# Patient Record
Sex: FEMALE | Race: WHITE | NOT HISPANIC OR LATINO | Employment: OTHER | URBAN - METROPOLITAN AREA
[De-identification: names, ages, dates, MRNs, and addresses within clinical notes are randomized per-mention and may not be internally consistent; named-entity substitution may affect disease eponyms.]

---

## 2023-04-18 ENCOUNTER — HOSPITAL ENCOUNTER (INPATIENT)
Facility: MEDICAL CENTER | Age: 38
LOS: 8 days | DRG: 432 | End: 2023-04-27
Attending: EMERGENCY MEDICINE | Admitting: INTERNAL MEDICINE
Payer: MEDICARE

## 2023-04-18 DIAGNOSIS — K31.89 PORTAL HYPERTENSIVE GASTROPATHY (HCC): ICD-10-CM

## 2023-04-18 DIAGNOSIS — D61.818 PANCYTOPENIA (HCC): ICD-10-CM

## 2023-04-18 DIAGNOSIS — K75.4 AUTOIMMUNE HEPATITIS (HCC): ICD-10-CM

## 2023-04-18 DIAGNOSIS — K76.6 PORTAL HYPERTENSIVE GASTROPATHY (HCC): ICD-10-CM

## 2023-04-18 DIAGNOSIS — K75.9 HEPATITIS: Chronic | ICD-10-CM

## 2023-04-18 DIAGNOSIS — I85.11 SECONDARY ESOPHAGEAL VARICES WITH BLEEDING (HCC): ICD-10-CM

## 2023-04-18 DIAGNOSIS — D62 ACUTE BLOOD LOSS ANEMIA: ICD-10-CM

## 2023-04-18 DIAGNOSIS — F10.231 ALCOHOL DEPENDENCE WITH WITHDRAWAL DELIRIUM (HCC): ICD-10-CM

## 2023-04-18 DIAGNOSIS — F11.90 CHRONIC, CONTINUOUS USE OF OPIOIDS: ICD-10-CM

## 2023-04-18 PROCEDURE — 80053 COMPREHEN METABOLIC PANEL: CPT | Mod: 91

## 2023-04-18 PROCEDURE — 85730 THROMBOPLASTIN TIME PARTIAL: CPT

## 2023-04-18 PROCEDURE — 82077 ASSAY SPEC XCP UR&BREATH IA: CPT

## 2023-04-18 PROCEDURE — 86900 BLOOD TYPING SEROLOGIC ABO: CPT

## 2023-04-18 PROCEDURE — 86850 RBC ANTIBODY SCREEN: CPT

## 2023-04-18 PROCEDURE — 83690 ASSAY OF LIPASE: CPT | Mod: 91

## 2023-04-18 PROCEDURE — 85025 COMPLETE CBC W/AUTO DIFF WBC: CPT | Mod: 91

## 2023-04-18 PROCEDURE — 36415 COLL VENOUS BLD VENIPUNCTURE: CPT

## 2023-04-18 PROCEDURE — 86901 BLOOD TYPING SEROLOGIC RH(D): CPT

## 2023-04-18 PROCEDURE — 85610 PROTHROMBIN TIME: CPT | Mod: 91

## 2023-04-18 PROCEDURE — 83605 ASSAY OF LACTIC ACID: CPT | Mod: 91

## 2023-04-18 PROCEDURE — 99285 EMERGENCY DEPT VISIT HI MDM: CPT

## 2023-04-18 PROCEDURE — 83735 ASSAY OF MAGNESIUM: CPT

## 2023-04-18 ASSESSMENT — FIBROSIS 4 INDEX: FIB4 SCORE: 6.85

## 2023-04-19 ENCOUNTER — ANESTHESIA EVENT (OUTPATIENT)
Dept: SURGERY | Facility: MEDICAL CENTER | Age: 38
DRG: 432 | End: 2023-04-19
Payer: MEDICARE

## 2023-04-19 ENCOUNTER — ANESTHESIA (OUTPATIENT)
Dept: SURGERY | Facility: MEDICAL CENTER | Age: 38
DRG: 432 | End: 2023-04-19
Payer: MEDICARE

## 2023-04-19 PROBLEM — E87.20 LACTIC ACIDOSIS: Status: ACTIVE | Noted: 2023-04-19

## 2023-04-19 PROBLEM — F10.10 ALCOHOL ABUSE: Status: ACTIVE | Noted: 2023-04-19

## 2023-04-19 PROBLEM — K75.9 HEPATITIS: Status: ACTIVE | Noted: 2023-04-19

## 2023-04-19 PROBLEM — K92.2 GI BLEED: Status: ACTIVE | Noted: 2023-04-19

## 2023-04-19 PROBLEM — D61.818 PANCYTOPENIA (HCC): Status: ACTIVE | Noted: 2023-04-19

## 2023-04-19 PROBLEM — D69.6 THROMBOCYTOPENIA (HCC): Status: ACTIVE | Noted: 2023-04-19

## 2023-04-19 LAB
ABO + RH BLD: NORMAL
ABO GROUP BLD: NORMAL
ALBUMIN SERPL BCP-MCNC: 3.1 G/DL (ref 3.2–4.9)
ALBUMIN SERPL BCP-MCNC: 3.8 G/DL (ref 3.2–4.9)
ALBUMIN/GLOB SERPL: 1.3 G/DL
ALBUMIN/GLOB SERPL: 1.5 G/DL
ALP SERPL-CCNC: 107 U/L (ref 30–99)
ALP SERPL-CCNC: 80 U/L (ref 30–99)
ALT SERPL-CCNC: 22 U/L (ref 2–50)
ALT SERPL-CCNC: 28 U/L (ref 2–50)
AMMONIA PLAS-SCNC: 34 UMOL/L (ref 11–45)
ANION GAP SERPL CALC-SCNC: 10 MMOL/L (ref 7–16)
ANION GAP SERPL CALC-SCNC: 18 MMOL/L (ref 7–16)
APTT PPP: 36.4 SEC (ref 24.7–36)
AST SERPL-CCNC: 49 U/L (ref 12–45)
AST SERPL-CCNC: 55 U/L (ref 12–45)
BARCODED ABORH UBTYP: 6200
BARCODED PRD CODE UBPRD: NORMAL
BARCODED UNIT NUM UBUNT: NORMAL
BASOPHILS # BLD AUTO: 0 % (ref 0–1.8)
BASOPHILS # BLD: 0 K/UL (ref 0–0.12)
BILIRUB SERPL-MCNC: 1.6 MG/DL (ref 0.1–1.5)
BILIRUB SERPL-MCNC: 1.8 MG/DL (ref 0.1–1.5)
BLD GP AB SCN SERPL QL: NORMAL
BLOOD CULTURE HOLD CXBCH: NORMAL
BUN SERPL-MCNC: 10 MG/DL (ref 8–22)
BUN SERPL-MCNC: 12 MG/DL (ref 8–22)
CALCIUM ALBUM COR SERPL-MCNC: 8.4 MG/DL (ref 8.5–10.5)
CALCIUM ALBUM COR SERPL-MCNC: 8.5 MG/DL (ref 8.5–10.5)
CALCIUM SERPL-MCNC: 7.7 MG/DL (ref 8.5–10.5)
CALCIUM SERPL-MCNC: 8.3 MG/DL (ref 8.5–10.5)
CFT BLD TEG: 5.3 MIN (ref 4.6–9.1)
CFT P HPASE BLD TEG: 5.2 MIN (ref 4.3–8.3)
CHLORIDE SERPL-SCNC: 105 MMOL/L (ref 96–112)
CHLORIDE SERPL-SCNC: 107 MMOL/L (ref 96–112)
CLOT ANGLE BLD TEG: 60.1 DEGREES (ref 63–78)
CO2 SERPL-SCNC: 17 MMOL/L (ref 20–33)
CO2 SERPL-SCNC: 20 MMOL/L (ref 20–33)
COMPONENT P 8504P: NORMAL
CREAT SERPL-MCNC: 0.34 MG/DL (ref 0.5–1.4)
CREAT SERPL-MCNC: 0.48 MG/DL (ref 0.5–1.4)
CT.EXTRINSIC BLD ROTEM: >5 MIN (ref 0.8–2.1)
EOSINOPHIL # BLD AUTO: 0 K/UL (ref 0–0.51)
EOSINOPHIL NFR BLD: 0 % (ref 0–6.9)
ERYTHROCYTE [DISTWIDTH] IN BLOOD BY AUTOMATED COUNT: 61.7 FL (ref 35.9–50)
ERYTHROCYTE [DISTWIDTH] IN BLOOD BY AUTOMATED COUNT: 61.7 FL (ref 35.9–50)
ETHANOL BLD-MCNC: <10.1 MG/DL
GFR SERPLBLD CREATININE-BSD FMLA CKD-EPI: 124 ML/MIN/1.73 M 2
GFR SERPLBLD CREATININE-BSD FMLA CKD-EPI: 134 ML/MIN/1.73 M 2
GLOBULIN SER CALC-MCNC: 2.1 G/DL (ref 1.9–3.5)
GLOBULIN SER CALC-MCNC: 2.9 G/DL (ref 1.9–3.5)
GLUCOSE SERPL-MCNC: 69 MG/DL (ref 65–99)
GLUCOSE SERPL-MCNC: 75 MG/DL (ref 65–99)
HCT VFR BLD AUTO: 28.5 % (ref 37–47)
HCT VFR BLD AUTO: 34.1 % (ref 37–47)
HEMOCCULT STL QL: POSITIVE
HGB BLD-MCNC: 11.1 G/DL (ref 12–16)
HGB BLD-MCNC: 9.2 G/DL (ref 12–16)
HGB BLD-MCNC: 9.4 G/DL (ref 12–16)
HGB BLD-MCNC: 9.6 G/DL (ref 12–16)
IMM GRANULOCYTES # BLD AUTO: 0 K/UL (ref 0–0.11)
IMM GRANULOCYTES NFR BLD AUTO: 0 % (ref 0–0.9)
INR PPP: 1.6 (ref 0.87–1.13)
LACTATE SERPL-SCNC: 2.7 MMOL/L (ref 0.5–2)
LACTATE SERPL-SCNC: 3.8 MMOL/L (ref 0.5–2)
LIPASE SERPL-CCNC: 15 U/L (ref 11–82)
LYMPHOCYTES # BLD AUTO: 0.47 K/UL (ref 1–4.8)
LYMPHOCYTES NFR BLD: 13.1 % (ref 22–41)
MAGNESIUM SERPL-MCNC: 1.7 MG/DL (ref 1.5–2.5)
MCF BLD TEG: <40 MM (ref 52–69)
MCF.PLATELET INHIB BLD ROTEM: 11.7 MM (ref 15–32)
MCH RBC QN AUTO: 28 PG (ref 27–33)
MCH RBC QN AUTO: 28.3 PG (ref 27–33)
MCHC RBC AUTO-ENTMCNC: 32.3 G/DL (ref 33.6–35)
MCHC RBC AUTO-ENTMCNC: 32.6 G/DL (ref 33.6–35)
MCV RBC AUTO: 86.9 FL (ref 81.4–97.8)
MCV RBC AUTO: 87 FL (ref 81.4–97.8)
MONOCYTES # BLD AUTO: 0.24 K/UL (ref 0–0.85)
MONOCYTES NFR BLD AUTO: 6.7 % (ref 0–13.4)
NEUTROPHILS # BLD AUTO: 2.89 K/UL (ref 2–7.15)
NEUTROPHILS NFR BLD: 80.2 % (ref 44–72)
NRBC # BLD AUTO: 0 K/UL
NRBC BLD-RTO: 0 /100 WBC
PA AA BLD-ACNC: ABNORMAL % (ref 0–11)
PA ADP BLD-ACNC: ABNORMAL % (ref 0–17)
PLATELET # BLD AUTO: 29 K/UL (ref 164–446)
PLATELET # BLD AUTO: 44 K/UL (ref 164–446)
PMV BLD AUTO: 10.1 FL (ref 9–12.9)
PMV BLD AUTO: 11.1 FL (ref 9–12.9)
POTASSIUM SERPL-SCNC: 3.9 MMOL/L (ref 3.6–5.5)
POTASSIUM SERPL-SCNC: 4 MMOL/L (ref 3.6–5.5)
PRODUCT TYPE UPROD: NORMAL
PROT SERPL-MCNC: 5.2 G/DL (ref 6–8.2)
PROT SERPL-MCNC: 6.7 G/DL (ref 6–8.2)
PROTHROMBIN TIME: 18.7 SEC (ref 12–14.6)
RBC # BLD AUTO: 3.28 M/UL (ref 4.2–5.4)
RBC # BLD AUTO: 3.92 M/UL (ref 4.2–5.4)
RH BLD: NORMAL
SODIUM SERPL-SCNC: 137 MMOL/L (ref 135–145)
SODIUM SERPL-SCNC: 140 MMOL/L (ref 135–145)
TEG ALGORITHM TGALG: ABNORMAL
UNIT STATUS USTAT: NORMAL
WBC # BLD AUTO: 2.2 K/UL (ref 4.8–10.8)
WBC # BLD AUTO: 3.6 K/UL (ref 4.8–10.8)

## 2023-04-19 PROCEDURE — A9270 NON-COVERED ITEM OR SERVICE: HCPCS | Performed by: INTERNAL MEDICINE

## 2023-04-19 PROCEDURE — 770020 HCHG ROOM/CARE - TELE (206)

## 2023-04-19 PROCEDURE — 30233R1 TRANSFUSION OF NONAUTOLOGOUS PLATELETS INTO PERIPHERAL VEIN, PERCUTANEOUS APPROACH: ICD-10-PCS | Performed by: HOSPITALIST

## 2023-04-19 PROCEDURE — 99222 1ST HOSP IP/OBS MODERATE 55: CPT | Mod: AI | Performed by: INTERNAL MEDICINE

## 2023-04-19 PROCEDURE — 160048 HCHG OR STATISTICAL LEVEL 1-5: Performed by: SPECIALIST

## 2023-04-19 PROCEDURE — 700105 HCHG RX REV CODE 258: Performed by: EMERGENCY MEDICINE

## 2023-04-19 PROCEDURE — 700105 HCHG RX REV CODE 258: Performed by: ANESTHESIOLOGY

## 2023-04-19 PROCEDURE — 00731 ANES UPR GI NDSC PX NOS: CPT | Performed by: ANESTHESIOLOGY

## 2023-04-19 PROCEDURE — 700111 HCHG RX REV CODE 636 W/ 250 OVERRIDE (IP): Performed by: ANESTHESIOLOGY

## 2023-04-19 PROCEDURE — 700111 HCHG RX REV CODE 636 W/ 250 OVERRIDE (IP): Performed by: EMERGENCY MEDICINE

## 2023-04-19 PROCEDURE — 160208 HCHG ENDO MINUTES - EA ADDL 1 MIN LEVEL 4: Performed by: SPECIALIST

## 2023-04-19 PROCEDURE — 700111 HCHG RX REV CODE 636 W/ 250 OVERRIDE (IP)

## 2023-04-19 PROCEDURE — 82272 OCCULT BLD FECES 1-3 TESTS: CPT

## 2023-04-19 PROCEDURE — 160035 HCHG PACU - 1ST 60 MINS PHASE I: Performed by: SPECIALIST

## 2023-04-19 PROCEDURE — 36415 COLL VENOUS BLD VENIPUNCTURE: CPT

## 2023-04-19 PROCEDURE — 700102 HCHG RX REV CODE 250 W/ 637 OVERRIDE(OP): Performed by: INTERNAL MEDICINE

## 2023-04-19 PROCEDURE — C9113 INJ PANTOPRAZOLE SODIUM, VIA: HCPCS | Performed by: INTERNAL MEDICINE

## 2023-04-19 PROCEDURE — 80053 COMPREHEN METABOLIC PANEL: CPT

## 2023-04-19 PROCEDURE — 700105 HCHG RX REV CODE 258: Performed by: INTERNAL MEDICINE

## 2023-04-19 PROCEDURE — 160009 HCHG ANES TIME/MIN: Performed by: SPECIALIST

## 2023-04-19 PROCEDURE — 85018 HEMOGLOBIN: CPT

## 2023-04-19 PROCEDURE — 700101 HCHG RX REV CODE 250: Performed by: INTERNAL MEDICINE

## 2023-04-19 PROCEDURE — 85576 BLOOD PLATELET AGGREGATION: CPT

## 2023-04-19 PROCEDURE — 85384 FIBRINOGEN ACTIVITY: CPT

## 2023-04-19 PROCEDURE — 36430 TRANSFUSION BLD/BLD COMPNT: CPT

## 2023-04-19 PROCEDURE — 700101 HCHG RX REV CODE 250: Performed by: ANESTHESIOLOGY

## 2023-04-19 PROCEDURE — 85347 COAGULATION TIME ACTIVATED: CPT

## 2023-04-19 PROCEDURE — 83605 ASSAY OF LACTIC ACID: CPT

## 2023-04-19 PROCEDURE — C9113 INJ PANTOPRAZOLE SODIUM, VIA: HCPCS | Performed by: EMERGENCY MEDICINE

## 2023-04-19 PROCEDURE — 85027 COMPLETE CBC AUTOMATED: CPT

## 2023-04-19 PROCEDURE — 96375 TX/PRO/DX INJ NEW DRUG ADDON: CPT

## 2023-04-19 PROCEDURE — 700111 HCHG RX REV CODE 636 W/ 250 OVERRIDE (IP): Performed by: INTERNAL MEDICINE

## 2023-04-19 PROCEDURE — P9034 PLATELETS, PHERESIS: HCPCS

## 2023-04-19 PROCEDURE — 06L38CZ OCCLUSION OF ESOPHAGEAL VEIN WITH EXTRALUMINAL DEVICE, VIA NATURAL OR ARTIFICIAL OPENING ENDOSCOPIC: ICD-10-PCS | Performed by: SPECIALIST

## 2023-04-19 PROCEDURE — 43205 ESOPHAGUS ENDOSCOPY/LIGATION: CPT | Performed by: SPECIALIST

## 2023-04-19 PROCEDURE — 160203 HCHG ENDO MINUTES - 1ST 30 MINS LEVEL 4: Performed by: SPECIALIST

## 2023-04-19 PROCEDURE — 160002 HCHG RECOVERY MINUTES (STAT): Performed by: SPECIALIST

## 2023-04-19 PROCEDURE — 96365 THER/PROPH/DIAG IV INF INIT: CPT

## 2023-04-19 PROCEDURE — 96366 THER/PROPH/DIAG IV INF ADDON: CPT

## 2023-04-19 PROCEDURE — 82140 ASSAY OF AMMONIA: CPT

## 2023-04-19 RX ORDER — LIDOCAINE HYDROCHLORIDE 20 MG/ML
INJECTION, SOLUTION EPIDURAL; INFILTRATION; INTRACAUDAL; PERINEURAL PRN
Status: DISCONTINUED | OUTPATIENT
Start: 2023-04-19 | End: 2023-04-19 | Stop reason: SURG

## 2023-04-19 RX ORDER — OXYCODONE HYDROCHLORIDE 5 MG/1
5 TABLET ORAL
Status: DISCONTINUED | OUTPATIENT
Start: 2023-04-19 | End: 2023-04-24

## 2023-04-19 RX ORDER — PANTOPRAZOLE SODIUM 40 MG/10ML
40 INJECTION, POWDER, LYOPHILIZED, FOR SOLUTION INTRAVENOUS 2 TIMES DAILY
Status: DISCONTINUED | OUTPATIENT
Start: 2023-04-19 | End: 2023-04-20

## 2023-04-19 RX ORDER — ONDANSETRON 2 MG/ML
4 INJECTION INTRAMUSCULAR; INTRAVENOUS EVERY 4 HOURS PRN
Status: DISCONTINUED | OUTPATIENT
Start: 2023-04-19 | End: 2023-04-27 | Stop reason: HOSPADM

## 2023-04-19 RX ORDER — MORPHINE SULFATE 4 MG/ML
4 INJECTION INTRAVENOUS
Status: DISCONTINUED | OUTPATIENT
Start: 2023-04-19 | End: 2023-04-24

## 2023-04-19 RX ORDER — EPHEDRINE SULFATE 50 MG/ML
5 INJECTION, SOLUTION INTRAVENOUS
Status: DISCONTINUED | OUTPATIENT
Start: 2023-04-19 | End: 2023-04-19 | Stop reason: HOSPADM

## 2023-04-19 RX ORDER — HYDROMORPHONE HYDROCHLORIDE 1 MG/ML
0.4 INJECTION, SOLUTION INTRAMUSCULAR; INTRAVENOUS; SUBCUTANEOUS
Status: DISCONTINUED | OUTPATIENT
Start: 2023-04-19 | End: 2023-04-19 | Stop reason: HOSPADM

## 2023-04-19 RX ORDER — PROMETHAZINE HYDROCHLORIDE 25 MG/1
12.5-25 TABLET ORAL EVERY 4 HOURS PRN
Status: DISCONTINUED | OUTPATIENT
Start: 2023-04-19 | End: 2023-04-24

## 2023-04-19 RX ORDER — BISACODYL 10 MG
10 SUPPOSITORY, RECTAL RECTAL
Status: DISCONTINUED | OUTPATIENT
Start: 2023-04-19 | End: 2023-04-24

## 2023-04-19 RX ORDER — LORAZEPAM 2 MG/ML
0.5 INJECTION INTRAMUSCULAR EVERY 4 HOURS PRN
Status: DISCONTINUED | OUTPATIENT
Start: 2023-04-19 | End: 2023-04-19

## 2023-04-19 RX ORDER — HALOPERIDOL 5 MG/ML
1 INJECTION INTRAMUSCULAR
Status: DISCONTINUED | OUTPATIENT
Start: 2023-04-19 | End: 2023-04-19 | Stop reason: HOSPADM

## 2023-04-19 RX ORDER — DEXMEDETOMIDINE HYDROCHLORIDE 100 UG/ML
INJECTION, SOLUTION INTRAVENOUS PRN
Status: DISCONTINUED | OUTPATIENT
Start: 2023-04-19 | End: 2023-04-19 | Stop reason: SURG

## 2023-04-19 RX ORDER — OXYCODONE HCL 5 MG/5 ML
5 SOLUTION, ORAL ORAL
Status: DISCONTINUED | OUTPATIENT
Start: 2023-04-19 | End: 2023-04-19 | Stop reason: HOSPADM

## 2023-04-19 RX ORDER — OXYCODONE HCL 5 MG/5 ML
10 SOLUTION, ORAL ORAL
Status: DISCONTINUED | OUTPATIENT
Start: 2023-04-19 | End: 2023-04-19 | Stop reason: HOSPADM

## 2023-04-19 RX ORDER — PROCHLORPERAZINE EDISYLATE 5 MG/ML
5-10 INJECTION INTRAMUSCULAR; INTRAVENOUS EVERY 4 HOURS PRN
Status: DISCONTINUED | OUTPATIENT
Start: 2023-04-19 | End: 2023-04-27 | Stop reason: HOSPADM

## 2023-04-19 RX ORDER — SODIUM CHLORIDE 9 MG/ML
INJECTION, SOLUTION INTRAVENOUS CONTINUOUS
Status: DISCONTINUED | OUTPATIENT
Start: 2023-04-19 | End: 2023-04-21

## 2023-04-19 RX ORDER — IPRATROPIUM BROMIDE AND ALBUTEROL SULFATE 2.5; .5 MG/3ML; MG/3ML
3 SOLUTION RESPIRATORY (INHALATION)
Status: DISCONTINUED | OUTPATIENT
Start: 2023-04-19 | End: 2023-04-19 | Stop reason: HOSPADM

## 2023-04-19 RX ORDER — POLYETHYLENE GLYCOL 3350 17 G/17G
1 POWDER, FOR SOLUTION ORAL
Status: DISCONTINUED | OUTPATIENT
Start: 2023-04-19 | End: 2023-04-24

## 2023-04-19 RX ORDER — SODIUM CHLORIDE, SODIUM LACTATE, POTASSIUM CHLORIDE, CALCIUM CHLORIDE 600; 310; 30; 20 MG/100ML; MG/100ML; MG/100ML; MG/100ML
2000 INJECTION, SOLUTION INTRAVENOUS ONCE
Status: COMPLETED | OUTPATIENT
Start: 2023-04-19 | End: 2023-04-19

## 2023-04-19 RX ORDER — PROMETHAZINE HYDROCHLORIDE 25 MG/1
12.5-25 SUPPOSITORY RECTAL EVERY 4 HOURS PRN
Status: DISCONTINUED | OUTPATIENT
Start: 2023-04-19 | End: 2023-04-27 | Stop reason: HOSPADM

## 2023-04-19 RX ORDER — SODIUM CHLORIDE, SODIUM LACTATE, POTASSIUM CHLORIDE, CALCIUM CHLORIDE 600; 310; 30; 20 MG/100ML; MG/100ML; MG/100ML; MG/100ML
INJECTION, SOLUTION INTRAVENOUS CONTINUOUS
Status: CANCELLED | OUTPATIENT
Start: 2023-04-19

## 2023-04-19 RX ORDER — METOPROLOL TARTRATE 1 MG/ML
1 INJECTION, SOLUTION INTRAVENOUS
Status: DISCONTINUED | OUTPATIENT
Start: 2023-04-19 | End: 2023-04-19 | Stop reason: HOSPADM

## 2023-04-19 RX ORDER — OXYCODONE HYDROCHLORIDE 10 MG/1
10 TABLET ORAL
Status: DISCONTINUED | OUTPATIENT
Start: 2023-04-19 | End: 2023-04-24

## 2023-04-19 RX ORDER — HYDROMORPHONE HYDROCHLORIDE 1 MG/ML
0.2 INJECTION, SOLUTION INTRAMUSCULAR; INTRAVENOUS; SUBCUTANEOUS
Status: DISCONTINUED | OUTPATIENT
Start: 2023-04-19 | End: 2023-04-19 | Stop reason: HOSPADM

## 2023-04-19 RX ORDER — LABETALOL HYDROCHLORIDE 5 MG/ML
5 INJECTION, SOLUTION INTRAVENOUS
Status: DISCONTINUED | OUTPATIENT
Start: 2023-04-19 | End: 2023-04-19 | Stop reason: HOSPADM

## 2023-04-19 RX ORDER — ONDANSETRON 2 MG/ML
4 INJECTION INTRAMUSCULAR; INTRAVENOUS
Status: DISCONTINUED | OUTPATIENT
Start: 2023-04-19 | End: 2023-04-19 | Stop reason: HOSPADM

## 2023-04-19 RX ORDER — HYDROMORPHONE HYDROCHLORIDE 1 MG/ML
0.1 INJECTION, SOLUTION INTRAMUSCULAR; INTRAVENOUS; SUBCUTANEOUS
Status: DISCONTINUED | OUTPATIENT
Start: 2023-04-19 | End: 2023-04-19 | Stop reason: HOSPADM

## 2023-04-19 RX ORDER — ALPRAZOLAM 1 MG/1
1 TABLET ORAL NIGHTLY PRN
Status: DISCONTINUED | OUTPATIENT
Start: 2023-04-19 | End: 2023-04-24

## 2023-04-19 RX ORDER — AMOXICILLIN 250 MG
2 CAPSULE ORAL 2 TIMES DAILY
Status: DISCONTINUED | OUTPATIENT
Start: 2023-04-19 | End: 2023-04-24

## 2023-04-19 RX ORDER — MIDAZOLAM HYDROCHLORIDE 1 MG/ML
1 INJECTION INTRAMUSCULAR; INTRAVENOUS
Status: DISCONTINUED | OUTPATIENT
Start: 2023-04-19 | End: 2023-04-19 | Stop reason: HOSPADM

## 2023-04-19 RX ORDER — SODIUM CHLORIDE, SODIUM LACTATE, POTASSIUM CHLORIDE, CALCIUM CHLORIDE 600; 310; 30; 20 MG/100ML; MG/100ML; MG/100ML; MG/100ML
INJECTION, SOLUTION INTRAVENOUS
Status: DISCONTINUED | OUTPATIENT
Start: 2023-04-19 | End: 2023-04-19 | Stop reason: SURG

## 2023-04-19 RX ORDER — CITALOPRAM 20 MG/1
20 TABLET ORAL DAILY
Status: DISCONTINUED | OUTPATIENT
Start: 2023-04-19 | End: 2023-04-24

## 2023-04-19 RX ORDER — LACTULOSE 10 G/15ML
20 SOLUTION ORAL
COMMUNITY
End: 2023-10-19

## 2023-04-19 RX ORDER — LABETALOL HYDROCHLORIDE 5 MG/ML
10 INJECTION, SOLUTION INTRAVENOUS EVERY 4 HOURS PRN
Status: DISCONTINUED | OUTPATIENT
Start: 2023-04-19 | End: 2023-04-27 | Stop reason: HOSPADM

## 2023-04-19 RX ORDER — DIPHENHYDRAMINE HYDROCHLORIDE 50 MG/ML
12.5 INJECTION INTRAMUSCULAR; INTRAVENOUS
Status: DISCONTINUED | OUTPATIENT
Start: 2023-04-19 | End: 2023-04-19 | Stop reason: HOSPADM

## 2023-04-19 RX ORDER — ACETAMINOPHEN 325 MG/1
650 TABLET ORAL EVERY 6 HOURS PRN
Status: DISCONTINUED | OUTPATIENT
Start: 2023-04-19 | End: 2023-04-24

## 2023-04-19 RX ORDER — ONDANSETRON 4 MG/1
4 TABLET, ORALLY DISINTEGRATING ORAL EVERY 4 HOURS PRN
Status: DISCONTINUED | OUTPATIENT
Start: 2023-04-19 | End: 2023-04-24

## 2023-04-19 RX ORDER — MORPHINE SULFATE 4 MG/ML
INJECTION INTRAVENOUS
Status: COMPLETED
Start: 2023-04-19 | End: 2023-04-19

## 2023-04-19 RX ORDER — MEPERIDINE HYDROCHLORIDE 25 MG/ML
12.5 INJECTION INTRAMUSCULAR; INTRAVENOUS; SUBCUTANEOUS
Status: DISCONTINUED | OUTPATIENT
Start: 2023-04-19 | End: 2023-04-19 | Stop reason: HOSPADM

## 2023-04-19 RX ORDER — HYDRALAZINE HYDROCHLORIDE 20 MG/ML
5 INJECTION INTRAMUSCULAR; INTRAVENOUS
Status: DISCONTINUED | OUTPATIENT
Start: 2023-04-19 | End: 2023-04-19 | Stop reason: HOSPADM

## 2023-04-19 RX ORDER — SPIRONOLACTONE 25 MG/1
50 TABLET ORAL 2 TIMES DAILY
Status: DISCONTINUED | OUTPATIENT
Start: 2023-04-19 | End: 2023-04-24

## 2023-04-19 RX ADMIN — SODIUM CHLORIDE, POTASSIUM CHLORIDE, SODIUM LACTATE AND CALCIUM CHLORIDE: 600; 310; 30; 20 INJECTION, SOLUTION INTRAVENOUS at 12:51

## 2023-04-19 RX ADMIN — OXYCODONE HYDROCHLORIDE 10 MG: 10 TABLET ORAL at 19:51

## 2023-04-19 RX ADMIN — SODIUM CHLORIDE, POTASSIUM CHLORIDE, SODIUM LACTATE AND CALCIUM CHLORIDE 2000 ML: 600; 310; 30; 20 INJECTION, SOLUTION INTRAVENOUS at 01:24

## 2023-04-19 RX ADMIN — FENTANYL CITRATE 50 MCG: 50 INJECTION, SOLUTION INTRAMUSCULAR; INTRAVENOUS at 12:59

## 2023-04-19 RX ADMIN — DEXMEDETOMIDINE 50 MCG: 200 INJECTION, SOLUTION INTRAVENOUS at 13:23

## 2023-04-19 RX ADMIN — OXYCODONE 5 MG: 5 TABLET ORAL at 15:17

## 2023-04-19 RX ADMIN — FENTANYL CITRATE 50 MCG: 0.05 INJECTION, SOLUTION INTRAMUSCULAR; INTRAVENOUS at 00:04

## 2023-04-19 RX ADMIN — PROPOFOL 50 MG: 10 INJECTION, EMULSION INTRAVENOUS at 13:20

## 2023-04-19 RX ADMIN — OCTREOTIDE ACETATE 50 MCG/HR: 200 INJECTION, SOLUTION INTRAVENOUS; SUBCUTANEOUS at 03:35

## 2023-04-19 RX ADMIN — SODIUM CHLORIDE: 9 INJECTION, SOLUTION INTRAVENOUS at 04:12

## 2023-04-19 RX ADMIN — PANTOPRAZOLE SODIUM 40 MG: 40 INJECTION, POWDER, LYOPHILIZED, FOR SOLUTION INTRAVENOUS at 16:49

## 2023-04-19 RX ADMIN — SODIUM CHLORIDE 8 MG/HR: 9 INJECTION, SOLUTION INTRAVENOUS at 01:11

## 2023-04-19 RX ADMIN — PROPOFOL 50 MG: 10 INJECTION, EMULSION INTRAVENOUS at 13:24

## 2023-04-19 RX ADMIN — PROPOFOL 50 MG: 10 INJECTION, EMULSION INTRAVENOUS at 13:19

## 2023-04-19 RX ADMIN — PROPOFOL 50 MG: 10 INJECTION, EMULSION INTRAVENOUS at 13:06

## 2023-04-19 RX ADMIN — ALPRAZOLAM 1 MG: 1 TABLET ORAL at 22:58

## 2023-04-19 RX ADMIN — LIDOCAINE HYDROCHLORIDE 100 MG: 20 INJECTION, SOLUTION EPIDURAL; INFILTRATION; INTRACAUDAL at 13:02

## 2023-04-19 RX ADMIN — PROPOFOL 50 MG: 10 INJECTION, EMULSION INTRAVENOUS at 13:10

## 2023-04-19 RX ADMIN — FENTANYL CITRATE 50 MCG: 50 INJECTION, SOLUTION INTRAMUSCULAR; INTRAVENOUS at 12:53

## 2023-04-19 RX ADMIN — CEFTRIAXONE SODIUM 1000 MG: 10 INJECTION, POWDER, FOR SOLUTION INTRAVENOUS at 22:58

## 2023-04-19 RX ADMIN — MORPHINE SULFATE 4 MG: 4 INJECTION INTRAVENOUS at 07:58

## 2023-04-19 RX ADMIN — MORPHINE SULFATE 4 MG: 4 INJECTION INTRAVENOUS at 04:18

## 2023-04-19 RX ADMIN — PANTOPRAZOLE SODIUM 40 MG: 40 INJECTION, POWDER, LYOPHILIZED, FOR SOLUTION INTRAVENOUS at 05:21

## 2023-04-19 RX ADMIN — ONDANSETRON 4 MG: 2 INJECTION INTRAMUSCULAR; INTRAVENOUS at 04:05

## 2023-04-19 RX ADMIN — THIAMINE HYDROCHLORIDE 100 MG: 100 INJECTION, SOLUTION INTRAMUSCULAR; INTRAVENOUS at 05:18

## 2023-04-19 RX ADMIN — OCTREOTIDE ACETATE 50 MCG/HR: 200 INJECTION, SOLUTION INTRAVENOUS; SUBCUTANEOUS at 01:19

## 2023-04-19 RX ADMIN — PROPOFOL 150 MG: 10 INJECTION, EMULSION INTRAVENOUS at 13:02

## 2023-04-19 RX ADMIN — MORPHINE SULFATE 4 MG: 4 INJECTION INTRAVENOUS at 01:03

## 2023-04-19 ASSESSMENT — PAIN DESCRIPTION - PAIN TYPE
TYPE: ACUTE PAIN

## 2023-04-19 ASSESSMENT — ENCOUNTER SYMPTOMS
POLYDIPSIA: 0
HALLUCINATIONS: 0
FEVER: 0
FLANK PAIN: 0
HEADACHES: 0
DOUBLE VISION: 0
NECK PAIN: 0
TREMORS: 0
BACK PAIN: 0
COUGH: 0
DIARRHEA: 0
FALLS: 0
PALPITATIONS: 0
VOMITING: 1
BLURRED VISION: 0
SPEECH CHANGE: 0
ABDOMINAL PAIN: 1
MYALGIAS: 0
SPUTUM PRODUCTION: 0
BLOOD IN STOOL: 0
NAUSEA: 1
HEARTBURN: 0
SHORTNESS OF BREATH: 0
ORTHOPNEA: 0
FOCAL WEAKNESS: 0
BRUISES/BLEEDS EASILY: 0
HEMOPTYSIS: 0
INSOMNIA: 0
PHOTOPHOBIA: 0
CHILLS: 0
CONSTIPATION: 0
ROS GI COMMENTS: HEMATEMESIS
NERVOUS/ANXIOUS: 0
SORE THROAT: 0
WEIGHT LOSS: 0
DIARRHEA: 1
WEAKNESS: 1

## 2023-04-19 ASSESSMENT — LIFESTYLE VARIABLES
DOES PATIENT WANT TO STOP DRINKING: CANNOT ASSESS
SUBSTANCE_ABUSE: 0
TOTAL SCORE: 0
HOW MANY TIMES IN THE PAST YEAR HAVE YOU HAD 5 OR MORE DRINKS IN A DAY: 1
ON A TYPICAL DAY WHEN YOU DRINK ALCOHOL HOW MANY DRINKS DO YOU HAVE: 1
CONSUMPTION TOTAL: POSITIVE
HAVE PEOPLE ANNOYED YOU BY CRITICIZING YOUR DRINKING: NO
ALCOHOL_USE: YES
TOTAL SCORE: 0
HAVE YOU EVER FELT YOU SHOULD CUT DOWN ON YOUR DRINKING: NO
EVER FELT BAD OR GUILTY ABOUT YOUR DRINKING: NO
AVERAGE NUMBER OF DAYS PER WEEK YOU HAVE A DRINK CONTAINING ALCOHOL: 1
EVER HAD A DRINK FIRST THING IN THE MORNING TO STEADY YOUR NERVES TO GET RID OF A HANGOVER: NO
TOTAL SCORE: 0

## 2023-04-19 ASSESSMENT — FIBROSIS 4 INDEX
FIB4 SCORE: 8.98
FIB4 SCORE: 8.98

## 2023-04-19 ASSESSMENT — PATIENT HEALTH QUESTIONNAIRE - PHQ9
2. FEELING DOWN, DEPRESSED, IRRITABLE, OR HOPELESS: NOT AT ALL
SUM OF ALL RESPONSES TO PHQ9 QUESTIONS 1 AND 2: 0
1. LITTLE INTEREST OR PLEASURE IN DOING THINGS: NOT AT ALL

## 2023-04-19 ASSESSMENT — PAIN SCALES - GENERAL: PAIN_LEVEL: 0

## 2023-04-19 NOTE — PROGRESS NOTES
[facility] Medicine Daily Progress Note    Date of Service  4/19/2023    Chief Complaint  Tonie Tineo is a 37 y.o. female admitted 4/18/2023 with hematemesis    Hospital Course  No notes on file    Interval Problem Update  Patient was seen and examined at bedside.  No acute events overnight. Patient is resting comfortably in bed and in no acute distress.     EGD today    I have discussed this patient's plan of care and discharge plan at IDT rounds today with Case Management, Nursing, Nursing leadership, and other members of the IDT team.    Consultants/Specialty  GI    Code Status  Full Code    Disposition  Patient is not medically cleared for discharge.   Anticipate discharge to to home with close outpatient follow-up.  I have placed the appropriate orders for post-discharge needs.    Review of Systems  Review of Systems   Constitutional:  Negative for chills and fever.   HENT:  Negative for congestion and sore throat.    Eyes:  Negative for blurred vision and double vision.   Respiratory:  Negative for cough and shortness of breath.    Cardiovascular:  Negative for chest pain and palpitations.   Skin:  Negative for rash.      Physical Exam  Temp:  [36.7 °C (98 °F)-37.6 °C (99.7 °F)] 37.6 °C (99.7 °F)  Pulse:  [] 82  Resp:  [12-23] 20  BP: ()/() 108/68  SpO2:  [91 %-96 %] 93 %    Physical Exam  Vitals and nursing note reviewed.   Constitutional:       General: She is not in acute distress.     Appearance: She is not toxic-appearing.      Comments: conversational   HENT:      Head: Normocephalic.      Right Ear: External ear normal.      Left Ear: External ear normal.      Nose: No congestion.      Mouth/Throat:      Mouth: Mucous membranes are moist.      Pharynx: No oropharyngeal exudate.   Eyes:      General: No scleral icterus.     Pupils: Pupils are equal, round, and reactive to light.   Cardiovascular:      Rate and Rhythm: Normal rate and regular rhythm.      Heart sounds: No murmur  heard.  Pulmonary:      Breath sounds: No wheezing.   Abdominal:      Tenderness: There is abdominal tenderness. There is no guarding or rebound.   Skin:     Findings: No bruising.   Neurological:      General: No focal deficit present.      Mental Status: She is alert and oriented to person, place, and time.      Cranial Nerves: No cranial nerve deficit.   Psychiatric:         Mood and Affect: Mood normal.         Behavior: Behavior normal.       Fluids    Intake/Output Summary (Last 24 hours) at 4/19/2023 1208  Last data filed at 4/19/2023 1105  Gross per 24 hour   Intake 812.94 ml   Output 100 ml   Net 712.94 ml       Laboratory  Recent Labs     04/18/23 2223 04/18/23 2352 04/19/23  0805 04/19/23  1056   WBC 3.8* 3.6*  --  2.2*   RBC 4.09* 3.92*  --  3.28*   HEMOGLOBIN 11.4* 11.1* 9.4* 9.2*   HEMATOCRIT 35.5* 34.1*  --  28.5*   MCV 86.8 87.0  --  86.9   MCH 27.9 28.3  --  28.0   MCHC 32.1* 32.6*  --  32.3*   RDW 19.2* 61.7*  --  61.7*   PLATELETCT 51* 44*  --  29*   MPV 9.8 11.1  --  10.1     Recent Labs     04/18/23 2223 04/18/23 2352 04/19/23  1056   SODIUM 136 140 137   POTASSIUM 3.5 3.9 4.0   CHLORIDE 103 105 107   CO2 23 17* 20   GLUCOSE 90 75 69   BUN 8 10 12   CREATININE 0.6 0.34* 0.48*   CALCIUM 8.7 8.3* 7.7*     Recent Labs     04/18/23 2223 04/18/23 2352   APTT  --  36.4*   INR 1.36 1.60*               Imaging  No orders to display        Assessment/Plan  * GI bleed- (present on admission)  Assessment & Plan  Patient presented with acute onset hematemesis, abdominal pain.  Abdomen is benign on exam.  Presumed upper GI bleed, likely variceal bleeding, given history of autoimmune hepatitis with variceal bleed 2 years ago requiring banding.  Plan: Continue treatment with octreotide, Protonix, prophylactic ceftriaxone  NPO  Hb drop from 11.4 to 9.4  Consult to GI placed  Transfuse Hb <7      Alcohol abuse  Assessment & Plan  Thiamine supplementation  Monitor for withdrawal    Lactic  acidosis  Assessment & Plan  ?  Secondary to liver disease, alcohol abuse and GI bleed  Continue IV hydration, thiamine, repeat lactic acid    Pancytopenia (HCC)  Assessment & Plan  ?  Secondary to chronic liver disease  Follow-up with PCP    Thrombocytopenia (HCC)  Assessment & Plan  Platelets count 51, 44  ?  Secondary to chronic liver disease  Repeat CBC, consider platelet transfusion if platelets count continues to drop and/or hematemesis recurs    Hepatitis  Assessment & Plan  Reported history of autoimmune hepatitis  Mild bilirubin 1.9 and AST 55 elevation noted.  Patient may have alcoholic hepatitis superimposed on autoimmune hepatitis?  Advised against drinking alcohol         VTE prophylaxis: SCDs/TEDs    I have performed a physical exam and reviewed and updated ROS and Plan today (4/19/2023). In review of yesterday's note (4/18/2023), there are no changes except as documented above.

## 2023-04-19 NOTE — ED PROVIDER NOTES
"ED Provider Note    CHIEF COMPLAINT  Chief Complaint   Patient presents with    GI Problem     Transfer from [facility] for UGIB with history of varices.        EXTERNAL RECORDS REVIEWED  [place] [facility] [place] 2 episodes of hematemesis about 1 to 200 cc of blood.  She was treated with octreotide omeprazole Rocephin antiemetics and some IV fluids.    HPI/ROS  LIMITATION TO HISTORY   Select: : None  OUTSIDE HISTORIAN(S):  EMS states she also received 0.5 mg of Valium prior to arrival and received 4 of Zofran    Tonie Tineo is a 37 y.o. female who presents to the emerged department for hematemesis.  The patient is an alcoholic she is actually in custody today when she started having hematemesis episodes.  She states that she last had a diagnosis of esophageal varices diagnosed 2 years ago in [place].  She states she her last drink was yesterday morning.  She still feels nauseated but mostly she is just having a burning generalized abdominal pain.  No diarrhea no fevers no chills no chest pain or shortness of breath.  She has not had further episodes of emesis since she was at the other facility    PAST MEDICAL HISTORY   has a past medical history of Autoimmune hepatitis (HCC) and Bleeding esophageal varices due to autoimmune hepatitis (HCC).    SURGICAL HISTORY  patient denies any surgical history    FAMILY HISTORY  No family history on file.    SOCIAL HISTORY  Social History     Tobacco Use    Smoking status: Former     Types: Cigarettes    Smokeless tobacco: Never   Vaping Use    Vaping Use: Never used   Substance and Sexual Activity    Alcohol use: Yes     Comment: daily \"99 bananas\"    Drug use: Yes     Comment: Hx of denies recent use.    Sexual activity: Not on file       CURRENT MEDICATIONS  Home Medications    **Home medications have not yet been reviewed for this encounter**         ALLERGIES  Allergies   Allergen Reactions    " "Levofloxacin Anaphylaxis       PHYSICAL EXAM  VITAL SIGNS: /60   Pulse (!) 114   Temp 36.8 °C (98.3 °F) (Temporal)   Resp (!) 23   Ht 1.727 m (5' 8\")   Wt 68 kg (150 lb)   SpO2 95%   BMI 22.81 kg/m²    Pulse Ox Interpretation:   Pulse Ox is within normal limits  Constitutional: Alert in mild distress  HENT: Normocephalic atraumatic, dry mucous membranes  Eyes: PER, Conjunctiva normal, Non-icteric.   Cardiovascular: Regular rate and rhythm, no murmurs.   Thorax & Lungs: Normal breath sounds, No respiratory distress, No wheezing, No chest tenderness.   Abdomen: Bowel sounds normal, Soft, mild generalized tenderness without rebound or guarding no pulsatile masses. No peritoneal signs.  Skin: Warm, Dry, No erythema, No rash.   Extremities/MSK: Intact equal distal pulses, No edema, No tenderness, No cyanosis, no major deformities noted  Neurologic: Alert and oriented x3, No focal deficits noted.       DIAGNOSTIC STUDIES / PROCEDURES  EKG  I have independently interpreted this EKG  No results found for this or any previous visit.      LABS  Labs Reviewed   CBC WITH DIFFERENTIAL - Abnormal; Notable for the following components:       Result Value    WBC 3.6 (*)     RBC 3.92 (*)     Hemoglobin 11.1 (*)     Hematocrit 34.1 (*)     MCHC 32.6 (*)     RDW 61.7 (*)     Platelet Count 44 (*)     Neutrophils-Polys 80.20 (*)     Lymphocytes 13.10 (*)     Lymphs (Absolute) 0.47 (*)     All other components within normal limits    Narrative:     Indicate which anticoagulants the patient is on:->UNKNOWN   COMP METABOLIC PANEL - Abnormal; Notable for the following components:    Co2 17 (*)     Anion Gap 18.0 (*)     Creatinine 0.34 (*)     Calcium 8.3 (*)     AST(SGOT) 55 (*)     Alkaline Phosphatase 107 (*)     Total Bilirubin 1.8 (*)     All other components within normal limits    Narrative:     Indicate which anticoagulants the patient is on:->UNKNOWN   APTT - Abnormal; Notable for the following components:    APTT " 36.4 (*)     All other components within normal limits    Narrative:     Indicate which anticoagulants the patient is on:->UNKNOWN   PROTHROMBIN TIME - Abnormal; Notable for the following components:    PT 18.7 (*)     INR 1.60 (*)     All other components within normal limits    Narrative:     Indicate which anticoagulants the patient is on:->UNKNOWN   LACTIC ACID - Abnormal; Notable for the following components:    Lactic Acid 3.8 (*)     All other components within normal limits   LIPASE    Narrative:     Indicate which anticoagulants the patient is on:->UNKNOWN   COD (ADULT)   AMMONIA   DIAGNOSTIC ALCOHOL    Narrative:     Indicate which anticoagulants the patient is on:->UNKNOWN   ABO RH CONFIRM   CORRECTED CALCIUM    Narrative:     Indicate which anticoagulants the patient is on:->UNKNOWN   ESTIMATED GFR    Narrative:     Indicate which anticoagulants the patient is on:->UNKNOWN   HGB   MAGNESIUM    Narrative:     Indicate which anticoagulants the patient is on:->UNKNOWN   LACTIC ACID         COURSE & MEDICAL DECISION MAKING    ED Observation Status? No; Patient does not meet criteria for ED Observation.     INITIAL ASSESSMENT, COURSE AND PLAN  Care Narrative: Patient presents with upper GI bleed with a history of possible varices this is cannot be corroborated through our system but she did have hematemesis at [place].  She was treated appropriately with octreotide Protonix and Rocephin we will continue the drip of octreotide and Protonix here.  She will be given further pain management for abdominal discomfort but she is no longer feeling nauseated.  Fortunately her hemoglobin only dropped 0.4 over the last hour and a half and she has had no further episodes of vomiting.  I doubt that she is actively hemorrhaging.  She did not have any dark or tarry stools prior to today.      Patient will be resuscitated with IV fluids pain management antiemetics if needed.  She will be admitted to the medicine  service.    1:17 AM  Spoke w Dr Polo who has accepted the patient for admission    HYDRATION: Based on the patient's presentation of Dehydration the patient was given IV fluids. IV Hydration was used because oral hydration failed due to vomiting. Upon recheck following hydration, the patient was improved.      ADDITIONAL PROBLEM LIST  Hematemesis  History of alcohol abuse  History of esophageal varices  Generalized abdominal pain    DISPOSITION AND DISCUSSIONS  I have discussed management of the patient with the following physicians and MAADEO's:  Dr polo    Discussion of management with other QHP or appropriate source(s): Pharmacy for meds drips         Decision tools and prescription drugs considered including, but not limited to: Antibiotics were already given to prophylax for SBP .    FINAL DIAGNOSIS  Upper GI bleed  History of alcohol abuse  History of varices       Electronically signed by: Nae Brink M.D., 4/18/2023 11:54 PM

## 2023-04-19 NOTE — CONSULTS
Date of Consultation:  4/19/2023    Patient: : Tonie Tineo  MRN: [id number]    Referring Physician: Gabe Suarez DO     GI:LUIS ALBERTO Kyle     Reason for Consultation: Hematemesis    History of Present Illness:   This is a 37-year-old female with a past medical history including autoimmune hepatitis, esophageal varices s/p banding (approximately [phone]) who presented to [facility] 4/18/2023.  Patient reports that she does not normally drink alcohol but had 1 alcoholic beverage yesterday.  She states 6 hours after having a drink, she developed nausea and vomited a small amount of blood.  30 minutes after her initial episode of vomiting, she had an episode of hematemesis approximately 100-150 mL and continued to have a total of 7 episodes of hematemesis since.  She reports associated periumbilical abdominal pain described as poking in addition to intermittent cramping.  She also developed black, liquid stools.  She states that she could feel blood building up in her stomach prior to vomiting but that is since been alleviated with medications.  She states that she sees Dr. Reggie Leonardo (GI) in [place], N/V. Current regimen includes lactulose, rifaximin, lactulose, spironolactone and lasix. She states she's had ascites and encephalopathy in the past, was supposed to take lactulose but couldn't afford it and only takes it twice a week.  In the emergency department, hemoglobin initially 11.4, repeat 11.1, and then this morning 9.4.  MCV normocytic at 86-87.  Thrombocytopenia with 51 at 223, follow-up platelets at 2352 44.  CHEM panel with mild metabolic acidosis likely secondary to bleeding.  AST 55, ALT 28, alk phos 107, total bilirubin 1.8 ammonia was initially 37 and subsequently decreased to 34.  Mild lactic acidosis at 3.8 which downtrending to 2.7.  Ethyl alcohol 20, diagnostic alcohol less than 10.1.  INR 1.6  Patient denies fever, chills, weight loss, appetite change,  "dysphagia, odynophagia, heartburn.      Tobacco use: Patient reports tobacco use but that she is in a process of quitting and has not had cigarettes in the last few days.  Alcohol use: Patient reports having 1 drink 14 2023 but normally does not drink alcohol  Illicit drug use: Denies    Last EGD: 2-4 years ago with GI doctor in [place], [place].  She states that esophageal varices were seen and she was treated with banding.  She reports having banding done twice.  Last colonoscopy: 2-4 years ago with GI doctor in [place], [place].    NSAID/ASA use: Denies  Anticoagulation use: Denies      Past Medical History:   Diagnosis Date    Autoimmune hepatitis (HCC)     Bleeding esophageal varices due to autoimmune hepatitis (HCC)          No past surgical history on file.    No family history on file.    Social History     Socioeconomic History    Marital status:    Tobacco Use    Smoking status: Former     Types: Cigarettes    Smokeless tobacco: Never   Vaping Use    Vaping Use: Never used   Substance and Sexual Activity    Alcohol use: Yes     Comment: daily \"99 bananas\"    Drug use: Yes     Comment: Hx of denies recent use.       Review of systems:  Review of Systems   Constitutional:  Positive for malaise/fatigue. Negative for chills and fever.   HENT:  Negative for congestion and sore throat.    Respiratory:  Negative for cough and shortness of breath.    Cardiovascular:  Negative for chest pain and leg swelling.   Gastrointestinal:  Positive for abdominal pain, diarrhea, melena, nausea and vomiting (+hematemesis). Negative for blood in stool, constipation and heartburn.   Genitourinary:  Negative for dysuria and flank pain.   Musculoskeletal:  Negative for falls and myalgias.   Neurological:  Positive for weakness. Negative for headaches.   Psychiatric/Behavioral:  Negative for substance abuse. The patient is not nervous/anxious and does not have insomnia.    All other systems reviewed and are negative.      Physical " Exam:  Vitals:    04/19/23 0250 04/19/23 0300 04/19/23 0552 04/19/23 0733   BP: 93/54 100/57  103/56   Pulse: 91 85  91   Resp: 12 14  20   Temp:  37.1 °C (98.8 °F)  37.3 °C (99.1 °F)   TempSrc:  Temporal  Temporal   SpO2: 93% 94%  92%   Weight:  75 kg (165 lb 5.5 oz) 75 kg (165 lb 5.5 oz)    Height:           Physical Exam  Vitals and nursing note reviewed.   Constitutional:       Appearance: Normal appearance. She is well-developed and overweight. She is not toxic-appearing.   HENT:      Head: Normocephalic.      Nose: Nose normal. No congestion.      Mouth/Throat:      Mouth: Mucous membranes are moist.      Pharynx: Oropharynx is clear. No oropharyngeal exudate.   Eyes:      General: No scleral icterus.     Extraocular Movements: Extraocular movements intact.      Conjunctiva/sclera: Conjunctivae normal.   Cardiovascular:      Rate and Rhythm: Normal rate and regular rhythm.      Pulses: Normal pulses.      Heart sounds: Normal heart sounds. No murmur heard.    No gallop.   Pulmonary:      Effort: Pulmonary effort is normal. No respiratory distress.      Breath sounds: Normal breath sounds. No wheezing.   Chest:      Chest wall: No tenderness.   Abdominal:      General: Abdomen is flat. Bowel sounds are normal. There is no distension.      Palpations: Abdomen is soft.      Tenderness: There is abdominal tenderness. There is no guarding.      Comments: Palpable liver 2 fingerbreadths beneath the costal margin.  No hepatojugular reflex.   Musculoskeletal:      Right lower leg: No edema.      Left lower leg: No edema.   Skin:     General: Skin is warm and dry.      Capillary Refill: Capillary refill takes less than 2 seconds.      Coloration: Skin is pale. Skin is not jaundiced.   Neurological:      General: No focal deficit present.      Mental Status: She is alert and oriented to person, place, and time. Mental status is at baseline.   Psychiatric:         Mood and Affect: Mood normal.         Behavior: Behavior  normal. Behavior is cooperative.         Thought Content: Thought content normal.         Judgment: Judgment normal.         Labs:  Recent Labs     04/18/23 2223 04/18/23 2352 04/19/23  0805   WBC 3.8* 3.6*  --    RBC 4.09* 3.92*  --    HEMOGLOBIN 11.4* 11.1* 9.4*   HEMATOCRIT 35.5* 34.1*  --    MCV 86.8 87.0  --    MCH 27.9 28.3  --    MCHC 32.1* 32.6*  --    RDW 19.2* 61.7*  --    PLATELETCT 51* 44*  --    MPV 9.8 11.1  --      Recent Labs     04/18/23 2223 04/18/23 2352   SODIUM 136 140   POTASSIUM 3.5 3.9   CHLORIDE 103 105   CO2 23 17*   GLUCOSE 90 75   BUN 8 10     Recent Labs     04/18/23 2223 04/18/23 2352   APTT  --  36.4*   INR 1.36 1.60*       Recent Labs     04/18/23 2223 04/18/23 2352 04/19/23  0009   ASTSGOT 52* 55*  --    ALTSGPT 32 28  --    TBILIRUBIN 1.9* 1.8*  --    ALKPHOSPHAT 111 107*  --    GLOBULIN  --  2.9  --    INR 1.36 1.60*  --    AMMONIA 37*  --  34         Imaging:  No image results found.            Impressions:  Hematemesis  Acute blood loss anemia secondary to hematemesis  Autoimmune hepatitis  Severe thrombocytopenia  Hyperammonemia-downtrending      MDM:  This is a pleasant 37-year-old female with a past medical history including autoimmune hepatitis and esophageal varices s/p banding who presents with multiple episodes of hematemesis.  Hemoglobin and platelets downtrending.  We will recheck stat labs.  Discussed concerns for upper GI bleeding with patient as well as further evaluation and possible treatment via EGD.  She is agreeable to proceed.  She is been n.p.o. since 4/18/2023.    UPDATE 1144: repeat cbc shows hgb stable but platelets  29. Updated Dr. Carr who is agreeable to continue with scoping due to large volume hematemesis. Ordered TEG as well as platelet infusion. Updated Dr. Suarez.          Recommendations:    Monitor h/h-  Hgb goal >7g (>9 in setting of active chest pain, ACS), INR <2, plat >50 with active bleeding.    - In this particular instance a variceal  bleed is on the differential diagnosis:  - Continue Octreotide drip 50mcg/hr  -Continue antibiotic (ceftriaxone typically) antibiotic prophylaxis is recommended in any cirrhotic with a gastrointestinal bleed of any fashion  - Do not overtransfuse in cirrhosis as it can increase portal pressures further and potentially worsen bleeding.  -Recommend obtaining abd US to assess for ascites    Recommend Spirinolactone 50mg, lasix 20mg as BP tolerates.  Recommend lactulose- titrate to 2-3 BM's per day.   Continue rifaximin.   Continue PPI IV BID  Maintain n.p.o. status  Plan for EGD today  Stat CBC and CMP    Discussed with patient, Dr. Suarez, Dr. Carr.      This note was generated using voice recognition software which has a small chance of producing errors of grammar and possibly content. I have made every reasonable attempt to find and correct any obvious errors, but expect that some may not be found prior to finalization of this note.

## 2023-04-19 NOTE — ASSESSMENT & PLAN NOTE
?  Secondary to liver disease, alcohol abuse and GI bleed  Continue IV hydration, thiamine, repeat lactic acid

## 2023-04-19 NOTE — PROGRESS NOTES
Pt received from ED. Tele monitor in place. VSS. Pt oriented to room. Educated on use of the call light. Pt demonstrated use of the call light. Discussed POC. All questions answered.

## 2023-04-19 NOTE — PROCEDURES
OPERATIVE REPORT    PATIENT:   Tonie Tineo   1985       PREOPERATIVE DIAGNOSES/INDICATIONS: hematemesis    POSTOPERATIVE DIAGNOSIS: esophageal varices, portal hypertensive gastropathy    PROCEDURE:  ESOPHAGOGASTRODUODENOSCOPY with banding    PHYSICIAN:  Nae Carr MD    ANESTHESIA:  Per anesthesiologist.    LOCATION: [facility]    CONSENT:  OBTAINED. The risks, benefits and alternatives of the procedure were discussed in details. The risks include and are not limited to bleeding, infection, perforation, missed lesions, and sedations risks (cardiopulmonary compromise and allergic reaction to medications).    DESCRIPTION: The patient presented to the procedure room.  After routine checkup was performed, patient was brought into the endoscopy suite.  Patient was placed on his left lateral decubitus position. A bite block was placed in patient's mouth. Patient was sedated by anesthesia.  Vital signs were monitored throughout the procedure.  Oxygenation support was provided throughout the procedure. Upper endoscope was inserted into patient's mouth and advanced to the second portion of the duodenum under direct visualization.      Once the site was reached and examined, the upper endoscope was withdrawn.  Retroflexion was made within the stomach.  The stomach was decompressed, scope was withdrawn and the procedure was terminated.    The patient tolerated the procedure well.  There were no immediate complications.    OPERATIVE FINDINGS:    1. Esophagus: distal varices only with red tamica spots. Two columns(very short) and two bands placed. Varices flattened.  2. Stomach: portal hypertensive gastropathy. No bleeding. No gastric varices.   3. Duodenum: normal    IMPRESSION/RECOMMENDATIONS:  Esophageal varices  Portal hypertensive gastropathy    Follow for bleeding, H/H and melena or hematemesis  Transfuse if needed.   Patient needs treatment for autoimmune hepatitis and follow up with hepatology  Continue  octreotide    This note has been transcribed with digital voice recognition software and although it has been reviewed may contain grammatical or word errors

## 2023-04-19 NOTE — ANESTHESIA TIME REPORT
Anesthesia Start and Stop Event Times     Date Time Event    4/19/2023 1233 Ready for Procedure     1251 Anesthesia Start     1339 Anesthesia Stop        Responsible Staff  04/19/23    Name Role Begin End    Vinicio Marrero M.D. Anesth 1251 1339        Overtime Reason:  no overtime (within assigned shift)    Comments:

## 2023-04-19 NOTE — ED NOTES
Pt transported on full vitals monitoring with T812 RN. All drips verified at bedside. Pt care transferred.

## 2023-04-19 NOTE — CARE PLAN
Problem: Pain - Standard  Goal: Alleviation of pain or a reduction in pain to the patient’s comfort goal  Outcome: Progressing     Problem: Knowledge Deficit - Standard  Goal: Patient and family/care givers will demonstrate understanding of plan of care, disease process/condition, diagnostic tests and medications  Outcome: Progressing   The patient is Watcher - Medium risk of patient condition declining or worsening         Progress made toward(s) clinical / shift goals:  Progressing    Patient is not progressing towards the following goals:

## 2023-04-19 NOTE — H&P
Hospital Medicine History & Physical Note    Date of Service  4/19/2023    Primary Care Physician  Pcp Pt States None      Code Status  Full Code    Chief Complaint  Chief Complaint   Patient presents with    GI Problem     Transfer from [facility] for UGIB with history of varices.        History of Presenting Illness  Tonie Tineo is a 37 y.o. female with past medical history of autoimmune hepatitis, variceal bleeding treated with banding 2 years ago in [place], who presented 4/18/2023 with complaints of vomiting with bright blood, that started around 10 PM after patient was incarcerated for unclear reason by law enforcement.  She reports total 6 times vomiting since then, each time with about 100 cc of bright blood.  Ms. Tineo reports sharp upper abdominal pain radiating to the left side, 7 out of 10, without alleviating or aggravating factors.  Last bowel movement was in the morning and was without melena nor blood in stool.  Reported binge drinking yesterday, otherwise patient denies regular alcohol intake.  Denies NSAID or blood thinners use.  Patient presented at [facility] ER and then transferred here.  Subsequently, treated with Protonix and octreotide.  At this time she denies nausea or abdominal pain.  She has not had any vomiting in the ER of [facility].  Vitals showed tachycardia 114, with stable blood pressure.  Hemoglobin 11.4, 11.1.  Platelets count 44.  Lactic acid is elevated at 3.8.  Alcohol level is elevated at 20.  INR 1.6  I discussed the plan of care with patient.    Review of Systems  Review of Systems   Constitutional:  Positive for malaise/fatigue. Negative for chills, fever and weight loss.   HENT:  Negative for ear pain, hearing loss and tinnitus.    Eyes:  Negative for blurred vision, double vision and photophobia.   Respiratory:  Negative for cough, hemoptysis and sputum production.    Cardiovascular:  Negative for chest pain, palpitations and orthopnea.    Gastrointestinal:  Positive for abdominal pain, nausea and vomiting. Negative for blood in stool, constipation, diarrhea, heartburn and melena.        Hematemesis   Genitourinary:  Negative for dysuria, flank pain, frequency and hematuria.   Musculoskeletal:  Negative for back pain, joint pain and neck pain.   Skin:  Negative for itching and rash.   Neurological:  Negative for tremors, speech change, focal weakness and headaches.   Endo/Heme/Allergies:  Negative for environmental allergies and polydipsia. Does not bruise/bleed easily.   Psychiatric/Behavioral:  Negative for hallucinations and substance abuse. The patient is not nervous/anxious.      Past Medical History   has a past medical history of Autoimmune hepatitis (HCC) and Bleeding esophageal varices due to autoimmune hepatitis (HCC).    Surgical History   has no past surgical history on file.     Family History     Family history reviewed with patient. There is no family history that is pertinent to the chief complaint.     Social History   reports that she has quit smoking. Her smoking use included cigarettes. She has never used smokeless tobacco. She reports current alcohol use. She reports current drug use.    Allergies  Allergies   Allergen Reactions    Levofloxacin Anaphylaxis       Medications  Prior to Admission Medications   Prescriptions Last Dose Informant Patient Reported? Taking?   Ferrous Sulfate (IRON) 325 (65 Fe) MG Tab 4/18/2023 at [facility] Patient Yes No   Sig: Take  by mouth.   HYDROcodone-acetaminophen (NORCO) 7.5-325 MG tab 4/18/2023 at [facility] Patient Yes No   Sig: Take 1 Tablet by mouth in the morning, at noon, and at bedtime.   alprazolam (XANAX) 2 MG tablet 4/17/2023 at PM Patient Yes No   Sig: Take 1 mg by mouth at bedtime as needed for Sleep.   benzonatate (TESSALON) 100 MG Cap 2 WEEKS AGO at [facility] Patient No No   Sig: Take 1 Capsule by mouth 3 times a day as needed for Cough.   citalopram (CELEXA) 20 MG Tab 4/17/2023 at PM Patient Yes No    Sig: Take 20 mg by mouth every day.   furosemide (LASIX) 40 MG Tab 4/18/2023 at [facility] Patient Yes No   Sig: Take 40 mg by mouth every day.   lactulose 10 GM/15ML Solution UNK at [facility] Patient Yes No   Sig: Take 20 g by mouth two times a week.   midodrine (PROAMATINE) 10 MG tablet PRN at [facility] Patient Yes No   Sig: Take 10 mg by mouth 3 times a day as needed. Indications: Disorder of Low Blood Pressure   nadolol (CORGARD) 20 MG Tab 4/18/2023 at AM Patient Yes No   Sig: Take 20 mg by mouth every day.   ondansetron (ZOFRAN ODT) 4 MG TABLET DISPERSIBLE PRN at [facility] Patient No No   Sig: Take 1 Tablet by mouth every four hours as needed for Nausea/Vomiting.   oxyCODONE immediate release (ROXICODONE) 10 MG immediate release tablet Not started at [facility] Patient Yes No   Sig: Take 10 mg by mouth in the morning, at noon, and at bedtime.   pantoprazole (PROTONIX) 40 MG Tablet Delayed Response 4/18/2023 at pm Patient Yes No   Sig: Take 40 mg by mouth 2 times a day.   riFAXIMin (XIFAXAN) 550 MG Tab tablet 4/18/2023 at [facility] Patient Yes No   Sig: Take 550 mg by mouth every day.   spironolactone (ALDACTONE) 50 MG Tab 4/18/2023 at [facility] Patient Yes No   Sig: Take 50 mg by mouth 2 times a day.      Facility-Administered Medications: None       Physical Exam  Temp:  [36.7 °C (98 °F)-36.8 °C (98.3 °F)] 36.8 °C (98.3 °F)  Pulse:  [108-114] 114  Resp:  [18-23] 23  BP: (121-129)/() 129/60  SpO2:  [91 %-95 %] 95 %  Blood Pressure: 129/60   Temperature: 36.8 °C (98.3 °F)   Pulse: (!) 114   Respiration: (!) 23   Pulse Oximetry: 95 %       Physical Exam  Vitals and nursing note reviewed.   Constitutional:       General: She is not in acute distress.     Appearance: Normal appearance. She is ill-appearing.   HENT:      Head: Normocephalic and atraumatic.      Nose: Nose normal.      Mouth/Throat:      Mouth: Mucous membranes are moist.   Eyes:      Extraocular Movements: Extraocular movements intact.      Pupils: Pupils are equal, round, and  reactive to light.   Cardiovascular:      Rate and Rhythm: Regular rhythm. Tachycardia present.   Pulmonary:      Effort: Pulmonary effort is normal.      Breath sounds: Normal breath sounds.   Abdominal:      General: Abdomen is flat. There is no distension.      Tenderness: There is abdominal tenderness in the epigastric area. There is no guarding or rebound.   Musculoskeletal:         General: No swelling or deformity. Normal range of motion.      Cervical back: Normal range of motion and neck supple.   Skin:     General: Skin is warm and dry.   Neurological:      General: No focal deficit present.      Mental Status: She is alert and oriented to person, place, and time.   Psychiatric:         Mood and Affect: Mood normal.         Behavior: Behavior normal.       Laboratory:  Recent Labs     04/18/23 2223 04/18/23 2352   WBC 3.8* 3.6*   RBC 4.09* 3.92*   HEMOGLOBIN 11.4* 11.1*   HEMATOCRIT 35.5* 34.1*   MCV 86.8 87.0   MCH 27.9 28.3   MCHC 32.1* 32.6*   RDW 19.2* 61.7*   PLATELETCT 51* 44*   MPV 9.8 11.1     Recent Labs     04/18/23 2223 04/18/23 2352   SODIUM 136 140   POTASSIUM 3.5 3.9   CHLORIDE 103 105   CO2 23 17*   GLUCOSE 90 75   BUN 8 10   CREATININE 0.6 0.34*   CALCIUM 8.7 8.3*     Recent Labs     04/18/23 2223 04/18/23 2352   ALTSGPT 32 28   ASTSGOT 52* 55*   ALKPHOSPHAT 111 107*   TBILIRUBIN 1.9* 1.8*   LIPASE 16 15   GLUCOSE 90 75     Recent Labs     04/18/23 2223 04/18/23 2352   APTT  --  36.4*   INR 1.36 1.60*     No results for input(s): NTPROBNP in the last 72 hours.      No results for input(s): TROPONINT in the last 72 hours.    Imaging:  No orders to display       Assessment/Plan:  Justification for Admission Status  I anticipate this patient will require at least two midnights for appropriate medical management, necessitating inpatient admission because of GI bleeding    Patient will need a Telemetry bed on MEDICAL service .  The need is secondary to GI bleed.    * GI bleed- (present  on admission)  Assessment & Plan  Patient presented with acute onset hematemesis, abdominal pain.  Abdomen is benign on exam.  Presumed upper GI bleed, likely variceal bleeding, given history of autoimmune hepatitis with variceal bleed 2 years ago requiring banding.  Plan: Continue treatment with octreotide, Protonix, prophylactic ceftriaxone  N.p.o. strictly, strict bedrest for now.  Monitor on telemetry vitals.  Continue IV fluid support.  Hold diuretics and beta-blocker.  Monitor H&H.  Hemoglobin 11.1.  RBC transfusion as needed with goal hemoglobin 7.0 and above  Plan to consult GI in the morning.    Alcohol abuse  Assessment & Plan  Thiamine supplementation  Monitor for withdrawal    Lactic acidosis  Assessment & Plan  ?  Secondary to liver disease, alcohol abuse and GI bleed  Continue IV hydration, thiamine, repeat lactic acid    Pancytopenia (HCC)  Assessment & Plan  ?  Secondary to chronic liver disease  Follow-up with PCP    Thrombocytopenia (HCC)  Assessment & Plan  Platelets count 51, 44  ?  Secondary to chronic liver disease  Repeat CBC, consider platelet transfusion if platelets count continues to drop and/or hematemesis recurs    Hepatitis  Assessment & Plan  Reported history of autoimmune hepatitis  Mild bilirubin 1.9 and AST 55 elevation noted.  Patient may have alcoholic hepatitis superimposed on autoimmune hepatitis?  Advised against drinking alcohol        VTE prophylaxis: SCDs/TEDs

## 2023-04-19 NOTE — ASSESSMENT & PLAN NOTE
Reported history of autoimmune hepatitis  The patient also with alcohol abuse  Will need close GI follow-up and alcohol cessation, medical adherence

## 2023-04-19 NOTE — ASSESSMENT & PLAN NOTE
Patient presented with acute onset hematemesis, abdominal pain.  Secondary to GI bleed, variceal bleeding, given history of autoimmune hepatitis with variceal bleed 2 years ago requiring banding.  GI consulted  S/p EGD with banding of esophageal varices

## 2023-04-19 NOTE — ED TRIAGE NOTES
Chief Complaint   Patient presents with    GI Problem     Transfer from [facility] for UGIB with history of varices.      Received zofran 4 mg, rocephin 2 gms, protonix 80 mg, valium 2.5 mg, phenergan 25 mg, ocreotide 50 mcg.

## 2023-04-19 NOTE — CARE PLAN
The patient is Watcher - Medium risk of patient condition declining or worsening    Shift Goals  Clinical Goals: Manage ABD pain, Watch Hgb, EGD  Patient Goals: Pain control    Progress made toward(s) clinical / shift goals:    Problem: Pain - Standard  Goal: Alleviation of pain or a reduction in pain to the patient’s comfort goal  Outcome: Progressing     Problem: Knowledge Deficit - Standard  Goal: Patient and family/care givers will demonstrate understanding of plan of care, disease process/condition, diagnostic tests and medications  Outcome: Progressing     Problem: Communication  Goal: The ability to communicate needs accurately and effectively will improve  Outcome: Progressing     Problem: Hemodynamics  Goal: Patient's hemodynamics, fluid balance and neurologic status will be stable or improve  Outcome: Progressing     Problem: Fluid Volume  Goal: Fluid volume balance will be maintained  Outcome: Progressing       Patient is not progressing towards the following goals:

## 2023-04-19 NOTE — PROGRESS NOTES
Patient back on unit, VSS with tele box monitoring. SR in 80's. On 2L NC, Pain at a 6/10 pt medicated. Moved back to clear liquid diet per MD.

## 2023-04-19 NOTE — OR NURSING
1333 Arrived from OR. ID verified. Report received attached to monitors. Patient sleep easy to arouse. 6l o2 mask  respirations even and unlabored. Vss.     134 Hand off to Cat RN

## 2023-04-19 NOTE — PROGRESS NOTES
Report received from outgoing nurse, care transferred at 0700. Patient A&Ox 4. Patient reports 7/10 lower left quad pain. MD notified, medication given. Whiteboard updated with plan for the day and names of staff. No questions or concerns at this time from the patient. Call light within reach, fall precautions in place.

## 2023-04-19 NOTE — ANESTHESIA POSTPROCEDURE EVALUATION
Patient: Tonie Tineo    Procedure Summary     Date: 04/19/23 Room / Location: [facility] ROOM 26 / SURGERY SAME DAY [facility]    Anesthesia Start: 1251 Anesthesia Stop: 1339    Procedures:       GASTROSCOPY (Esophagus)      GASTROSCOPY, WITH VARICEAL BANDING (Esophagus) Diagnosis: (Esophageal Varices,)    Surgeons: Nae Carr M.D. Responsible Provider: Vinicio Marrero M.D.    Anesthesia Type: general ASA Status: 3          Final Anesthesia Type: general  Last vitals  BP   Blood Pressure: 114/71    Temp   37.4 °C (99.4 °F)    Pulse   94   Resp   16    SpO2   94 %      Anesthesia Post Evaluation    Patient location during evaluation: PACU  Patient participation: complete - patient participated  Level of consciousness: awake and alert  Pain score: 0    Airway patency: patent  Anesthetic complications: no  Cardiovascular status: hemodynamically stable  Respiratory status: acceptable  Hydration status: euvolemic    PONV: none          There were no known notable events for this encounter.     Nurse Pain Score: 6 (NPRS)

## 2023-04-19 NOTE — OR NURSING
1422 patient's recovery is complete, vss, denies pain and nausea, report called to floor RN, plan explained to patient, patient agrees/asks questions, patient voided x1 in PACU, transferred to room with this RN and transport tech, safety ensured, care transferred

## 2023-04-19 NOTE — ANESTHESIA PREPROCEDURE EVALUATION
Case: [id number] Date/Time: 04/19/23 1245    Procedure: GASTROSCOPY (Esophagus)    Anesthesia type: MAC    Location: [facility] ROOM 26 / SURGERY SAME DAY [facility]    Surgeons: Nae Carr M.D.          Relevant Problems      (positive) Hepatitis       Physical Exam    Airway   Mallampati: II  TM distance: >3 FB  Neck ROM: full       Cardiovascular - normal exam  Rhythm: regular  Rate: normal  (-) murmur     Dental - normal exam           Pulmonary - normal exam  Breath sounds clear to auscultation     Abdominal    Neurological - normal exam                 Anesthesia Plan    ASA 3   ASA physical status 3 criteria: active hepatitis    Plan - general       Airway plan will be mask          Induction: intravenous    Postoperative Plan: Postoperative administration of opioids is intended.    Pertinent diagnostic labs and testing reviewed    Informed Consent:    Anesthetic plan and risks discussed with patient.    Use of blood products discussed with: patient whom consented to blood products.

## 2023-04-20 ENCOUNTER — APPOINTMENT (OUTPATIENT)
Dept: RADIOLOGY | Facility: MEDICAL CENTER | Age: 38
DRG: 432 | End: 2023-04-20
Attending: INTERNAL MEDICINE
Payer: MEDICARE

## 2023-04-20 PROBLEM — D62 ACUTE BLOOD LOSS ANEMIA: Status: ACTIVE | Noted: 2023-04-19

## 2023-04-20 PROBLEM — J18.9 COMMUNITY ACQUIRED PNEUMONIA: Status: ACTIVE | Noted: 2023-04-20

## 2023-04-20 PROBLEM — J96.01 ACUTE RESPIRATORY FAILURE WITH HYPOXIA (HCC): Status: ACTIVE | Noted: 2023-04-20

## 2023-04-20 LAB
ALBUMIN SERPL BCP-MCNC: 3 G/DL (ref 3.2–4.9)
ALBUMIN/GLOB SERPL: 1.3 G/DL
ALP SERPL-CCNC: 78 U/L (ref 30–99)
ALT SERPL-CCNC: 22 U/L (ref 2–50)
ANION GAP SERPL CALC-SCNC: 10 MMOL/L (ref 7–16)
AST SERPL-CCNC: 48 U/L (ref 12–45)
BASOPHILS # BLD AUTO: 0.5 % (ref 0–1.8)
BASOPHILS # BLD: 0.01 K/UL (ref 0–0.12)
BILIRUB SERPL-MCNC: 1.4 MG/DL (ref 0.1–1.5)
BUN SERPL-MCNC: 11 MG/DL (ref 8–22)
CALCIUM ALBUM COR SERPL-MCNC: 8.3 MG/DL (ref 8.5–10.5)
CALCIUM SERPL-MCNC: 7.5 MG/DL (ref 8.5–10.5)
CHLORIDE SERPL-SCNC: 110 MMOL/L (ref 96–112)
CO2 SERPL-SCNC: 20 MMOL/L (ref 20–33)
CREAT SERPL-MCNC: 0.51 MG/DL (ref 0.5–1.4)
EOSINOPHIL # BLD AUTO: 0.04 K/UL (ref 0–0.51)
EOSINOPHIL NFR BLD: 1.8 % (ref 0–6.9)
ERYTHROCYTE [DISTWIDTH] IN BLOOD BY AUTOMATED COUNT: 60.6 FL (ref 35.9–50)
FLUAV RNA SPEC QL NAA+PROBE: NEGATIVE
FLUBV RNA SPEC QL NAA+PROBE: NEGATIVE
GFR SERPLBLD CREATININE-BSD FMLA CKD-EPI: 122 ML/MIN/1.73 M 2
GLOBULIN SER CALC-MCNC: 2.4 G/DL (ref 1.9–3.5)
GLUCOSE SERPL-MCNC: 84 MG/DL (ref 65–99)
HCT VFR BLD AUTO: 28.1 % (ref 37–47)
HGB BLD-MCNC: 9.2 G/DL (ref 12–16)
HGB BLD-MCNC: 9.5 G/DL (ref 12–16)
HGB BLD-MCNC: 9.5 G/DL (ref 12–16)
IMM GRANULOCYTES # BLD AUTO: 0.01 K/UL (ref 0–0.11)
IMM GRANULOCYTES NFR BLD AUTO: 0.5 % (ref 0–0.9)
LYMPHOCYTES # BLD AUTO: 0.55 K/UL (ref 1–4.8)
LYMPHOCYTES NFR BLD: 24.9 % (ref 22–41)
MCH RBC QN AUTO: 28.2 PG (ref 27–33)
MCHC RBC AUTO-ENTMCNC: 32.7 G/DL (ref 33.6–35)
MCV RBC AUTO: 86.2 FL (ref 81.4–97.8)
MONOCYTES # BLD AUTO: 0.19 K/UL (ref 0–0.85)
MONOCYTES NFR BLD AUTO: 8.6 % (ref 0–13.4)
NEUTROPHILS # BLD AUTO: 1.41 K/UL (ref 2–7.15)
NEUTROPHILS NFR BLD: 63.7 % (ref 44–72)
NRBC # BLD AUTO: 0 K/UL
NRBC BLD-RTO: 0 /100 WBC
PLATELET # BLD AUTO: 41 K/UL (ref 164–446)
PMV BLD AUTO: 10.2 FL (ref 9–12.9)
POTASSIUM SERPL-SCNC: 4 MMOL/L (ref 3.6–5.5)
PROT SERPL-MCNC: 5.4 G/DL (ref 6–8.2)
RBC # BLD AUTO: 3.26 M/UL (ref 4.2–5.4)
RSV RNA SPEC QL NAA+PROBE: NEGATIVE
SARS-COV-2 RNA RESP QL NAA+PROBE: NOTDETECTED
SODIUM SERPL-SCNC: 140 MMOL/L (ref 135–145)
SPECIMEN SOURCE: NORMAL
WBC # BLD AUTO: 2.2 K/UL (ref 4.8–10.8)

## 2023-04-20 PROCEDURE — 0241U HCHG SARS-COV-2 COVID-19 NFCT DS RESP RNA 4 TRGT MIC: CPT

## 2023-04-20 PROCEDURE — A9270 NON-COVERED ITEM OR SERVICE: HCPCS | Performed by: INTERNAL MEDICINE

## 2023-04-20 PROCEDURE — 36415 COLL VENOUS BLD VENIPUNCTURE: CPT

## 2023-04-20 PROCEDURE — 99232 SBSQ HOSP IP/OBS MODERATE 35: CPT | Performed by: INTERNAL MEDICINE

## 2023-04-20 PROCEDURE — 94760 N-INVAS EAR/PLS OXIMETRY 1: CPT

## 2023-04-20 PROCEDURE — 85018 HEMOGLOBIN: CPT

## 2023-04-20 PROCEDURE — 700111 HCHG RX REV CODE 636 W/ 250 OVERRIDE (IP): Performed by: INTERNAL MEDICINE

## 2023-04-20 PROCEDURE — 80053 COMPREHEN METABOLIC PANEL: CPT

## 2023-04-20 PROCEDURE — C9803 HOPD COVID-19 SPEC COLLECT: HCPCS | Performed by: INTERNAL MEDICINE

## 2023-04-20 PROCEDURE — 700102 HCHG RX REV CODE 250 W/ 637 OVERRIDE(OP): Performed by: INTERNAL MEDICINE

## 2023-04-20 PROCEDURE — 94640 AIRWAY INHALATION TREATMENT: CPT

## 2023-04-20 PROCEDURE — 700101 HCHG RX REV CODE 250: Performed by: INTERNAL MEDICINE

## 2023-04-20 PROCEDURE — 700105 HCHG RX REV CODE 258: Performed by: INTERNAL MEDICINE

## 2023-04-20 PROCEDURE — 770020 HCHG ROOM/CARE - TELE (206)

## 2023-04-20 PROCEDURE — 85025 COMPLETE CBC W/AUTO DIFF WBC: CPT

## 2023-04-20 PROCEDURE — C9113 INJ PANTOPRAZOLE SODIUM, VIA: HCPCS | Performed by: INTERNAL MEDICINE

## 2023-04-20 PROCEDURE — 71045 X-RAY EXAM CHEST 1 VIEW: CPT

## 2023-04-20 RX ORDER — GUAIFENESIN 600 MG/1
600 TABLET, EXTENDED RELEASE ORAL EVERY 12 HOURS
Status: COMPLETED | OUTPATIENT
Start: 2023-04-20 | End: 2023-04-23

## 2023-04-20 RX ORDER — SULFAMETHOXAZOLE AND TRIMETHOPRIM 800; 160 MG/1; MG/1
1 TABLET ORAL EVERY 12 HOURS
Status: DISCONTINUED | OUTPATIENT
Start: 2023-04-20 | End: 2023-04-20

## 2023-04-20 RX ORDER — IPRATROPIUM BROMIDE AND ALBUTEROL SULFATE 2.5; .5 MG/3ML; MG/3ML
3 SOLUTION RESPIRATORY (INHALATION)
Status: DISCONTINUED | OUTPATIENT
Start: 2023-04-20 | End: 2023-04-20

## 2023-04-20 RX ORDER — OMEPRAZOLE 20 MG/1
20 CAPSULE, DELAYED RELEASE ORAL DAILY
Status: DISCONTINUED | OUTPATIENT
Start: 2023-04-20 | End: 2023-04-24

## 2023-04-20 RX ORDER — BENZONATATE 100 MG/1
100 CAPSULE ORAL 3 TIMES DAILY PRN
Status: DISPENSED | OUTPATIENT
Start: 2023-04-20 | End: 2023-04-23

## 2023-04-20 RX ORDER — AZITHROMYCIN 250 MG/1
500 TABLET, FILM COATED ORAL
Status: COMPLETED | OUTPATIENT
Start: 2023-04-20 | End: 2023-04-22

## 2023-04-20 RX ORDER — IPRATROPIUM BROMIDE AND ALBUTEROL SULFATE 2.5; .5 MG/3ML; MG/3ML
3 SOLUTION RESPIRATORY (INHALATION)
Status: DISPENSED | OUTPATIENT
Start: 2023-04-20 | End: 2023-04-22

## 2023-04-20 RX ORDER — SULFAMETHOXAZOLE AND TRIMETHOPRIM 800; 160 MG/1; MG/1
1 TABLET ORAL EVERY 12 HOURS
Status: DISCONTINUED | OUTPATIENT
Start: 2023-04-23 | End: 2023-04-22

## 2023-04-20 RX ADMIN — OXYCODONE HYDROCHLORIDE 10 MG: 10 TABLET ORAL at 17:27

## 2023-04-20 RX ADMIN — CEFTRIAXONE SODIUM 2000 MG: 10 INJECTION, POWDER, FOR SOLUTION INTRAVENOUS at 21:10

## 2023-04-20 RX ADMIN — BENZONATATE 100 MG: 100 CAPSULE ORAL at 15:16

## 2023-04-20 RX ADMIN — SPIRONOLACTONE 50 MG: 25 TABLET ORAL at 06:16

## 2023-04-20 RX ADMIN — IPRATROPIUM BROMIDE AND ALBUTEROL SULFATE 3 ML: .5; 2.5 SOLUTION RESPIRATORY (INHALATION) at 15:17

## 2023-04-20 RX ADMIN — OXYCODONE HYDROCHLORIDE 10 MG: 10 TABLET ORAL at 08:07

## 2023-04-20 RX ADMIN — GUAIFENESIN 600 MG: 600 TABLET, EXTENDED RELEASE ORAL at 17:26

## 2023-04-20 RX ADMIN — SODIUM CHLORIDE: 9 INJECTION, SOLUTION INTRAVENOUS at 03:49

## 2023-04-20 RX ADMIN — THIAMINE HYDROCHLORIDE 100 MG: 100 INJECTION, SOLUTION INTRAMUSCULAR; INTRAVENOUS at 06:28

## 2023-04-20 RX ADMIN — IPRATROPIUM BROMIDE AND ALBUTEROL SULFATE 3 ML: .5; 2.5 SOLUTION RESPIRATORY (INHALATION) at 18:44

## 2023-04-20 RX ADMIN — SPIRONOLACTONE 50 MG: 25 TABLET ORAL at 15:00

## 2023-04-20 RX ADMIN — AZITHROMYCIN MONOHYDRATE 500 MG: 250 TABLET ORAL at 15:00

## 2023-04-20 RX ADMIN — PANTOPRAZOLE SODIUM 40 MG: 40 INJECTION, POWDER, LYOPHILIZED, FOR SOLUTION INTRAVENOUS at 06:05

## 2023-04-20 RX ADMIN — MORPHINE SULFATE 4 MG: 4 INJECTION INTRAVENOUS at 22:41

## 2023-04-20 RX ADMIN — OXYCODONE HYDROCHLORIDE 10 MG: 10 TABLET ORAL at 21:07

## 2023-04-20 RX ADMIN — OXYCODONE HYDROCHLORIDE 10 MG: 10 TABLET ORAL at 03:48

## 2023-04-20 RX ADMIN — SODIUM CHLORIDE: 9 INJECTION, SOLUTION INTRAVENOUS at 15:19

## 2023-04-20 RX ADMIN — CITALOPRAM HYDROBROMIDE 20 MG: 20 TABLET ORAL at 06:05

## 2023-04-20 RX ADMIN — OCTREOTIDE ACETATE 50 MCG/HR: 200 INJECTION, SOLUTION INTRAVENOUS; SUBCUTANEOUS at 01:28

## 2023-04-20 RX ADMIN — OXYCODONE HYDROCHLORIDE 10 MG: 10 TABLET ORAL at 12:53

## 2023-04-20 RX ADMIN — OMEPRAZOLE 20 MG: 20 CAPSULE, DELAYED RELEASE ORAL at 17:26

## 2023-04-20 RX ADMIN — RIFAXIMIN 550 MG: 550 TABLET ORAL at 06:05

## 2023-04-20 RX ADMIN — ONDANSETRON 4 MG: 2 INJECTION INTRAMUSCULAR; INTRAVENOUS at 21:08

## 2023-04-20 ASSESSMENT — ENCOUNTER SYMPTOMS
LOSS OF CONSCIOUSNESS: 0
HALLUCINATIONS: 0
NAUSEA: 0
BLURRED VISION: 0
SHORTNESS OF BREATH: 1
BACK PAIN: 0
VOMITING: 0
SORE THROAT: 0
FEVER: 0
PALPITATIONS: 0
ABDOMINAL PAIN: 0
COUGH: 1
FALLS: 0
CHILLS: 0
WHEEZING: 1
SEIZURES: 0
DOUBLE VISION: 0

## 2023-04-20 ASSESSMENT — PATIENT HEALTH QUESTIONNAIRE - PHQ9
1. LITTLE INTEREST OR PLEASURE IN DOING THINGS: NOT AT ALL
2. FEELING DOWN, DEPRESSED, IRRITABLE, OR HOPELESS: NOT AT ALL
SUM OF ALL RESPONSES TO PHQ9 QUESTIONS 1 AND 2: 0

## 2023-04-20 ASSESSMENT — COGNITIVE AND FUNCTIONAL STATUS - GENERAL
STANDING UP FROM CHAIR USING ARMS: A LITTLE
CLIMB 3 TO 5 STEPS WITH RAILING: A LITTLE
SUGGESTED CMS G CODE MODIFIER MOBILITY: CJ
DAILY ACTIVITIY SCORE: 24
MOBILITY SCORE: 21
SUGGESTED CMS G CODE MODIFIER DAILY ACTIVITY: CH
WALKING IN HOSPITAL ROOM: A LITTLE

## 2023-04-20 ASSESSMENT — PAIN DESCRIPTION - PAIN TYPE
TYPE: ACUTE PAIN

## 2023-04-20 ASSESSMENT — LIFESTYLE VARIABLES: SUBSTANCE_ABUSE: 0

## 2023-04-20 ASSESSMENT — FIBROSIS 4 INDEX: FIB4 SCORE: 9.48

## 2023-04-20 NOTE — CARE PLAN
The patient is Stable - Low risk of patient condition declining or worsening    Shift Goals  Clinical Goals: monitor HGB  Patient Goals: pain control  Family Goals: na    Progress made toward(s) clinical / shift goals:    Problem: Gastrointestinal Irritability  Goal: Nausea and vomiting will be absent or improve  Outcome: Progressing  Note: Patient has not complained of nausea or vomiting.        Patient is not progressing towards the following goals:      Problem: Mobility  Goal: Patient's capacity to carry out activities will improve  Outcome: Not Progressing  Note: Patient refuses to get out of bed despite education.

## 2023-04-20 NOTE — PROGRESS NOTES
[facility] Medicine Daily Progress Note    Date of Service  4/20/2023    Chief Complaint  Tonie Tineo is a 37 y.o. female admitted 4/18/2023 with hematemesis    Hospital Course  No notes on file    Interval Problem Update  Patient was seen and examined at bedside.  No acute events overnight. Patient is resting comfortably in bed and in no acute distress.     S/p EGD with variceal banding  Antibiotics for CAP    I have discussed this patient's plan of care and discharge plan at IDT rounds today with Case Management, Nursing, Nursing leadership, and other members of the IDT team.    Consultants/Specialty  GI    Code Status  Full Code    Disposition  Patient is not medically cleared for discharge.   Anticipate discharge to to home with close outpatient follow-up.  I have placed the appropriate orders for post-discharge needs.    Review of Systems  Review of Systems   Constitutional:  Negative for chills and fever.   HENT:  Positive for congestion. Negative for sore throat.    Eyes:  Negative for blurred vision and double vision.   Respiratory:  Positive for cough, shortness of breath and wheezing.    Cardiovascular:  Negative for chest pain and palpitations.   Gastrointestinal:  Negative for abdominal pain, nausea and vomiting.   Genitourinary:  Negative for dysuria and frequency.   Musculoskeletal:  Negative for back pain and falls.   Skin:  Negative for rash.   Neurological:  Negative for seizures and loss of consciousness.   Psychiatric/Behavioral:  Negative for hallucinations and substance abuse.       Physical Exam  Temp:  [36.4 °C (97.6 °F)-37.7 °C (99.9 °F)] 36.4 °C (97.6 °F)  Pulse:  [] 79  Resp:  [13-18] 18  BP: ()/(52-81) 108/70  SpO2:  [84 %-97 %] 93 %    Physical Exam  Vitals and nursing note reviewed.   Constitutional:       General: She is not in acute distress.     Appearance: She is ill-appearing. She is not toxic-appearing.      Comments: conversational   HENT:      Head: Normocephalic.       Right Ear: External ear normal.      Left Ear: External ear normal.      Nose: No congestion.      Mouth/Throat:      Mouth: Mucous membranes are moist.      Pharynx: No oropharyngeal exudate.   Eyes:      General: No scleral icterus.     Pupils: Pupils are equal, round, and reactive to light.   Cardiovascular:      Rate and Rhythm: Normal rate and regular rhythm.      Heart sounds: No murmur heard.  Pulmonary:      Breath sounds: Wheezing and rhonchi present.   Abdominal:      Tenderness: There is abdominal tenderness. There is no guarding or rebound.   Skin:     Findings: No bruising.   Neurological:      General: No focal deficit present.      Mental Status: She is alert and oriented to person, place, and time.      Cranial Nerves: No cranial nerve deficit.   Psychiatric:         Mood and Affect: Mood normal.         Behavior: Behavior normal.       Fluids    Intake/Output Summary (Last 24 hours) at 4/20/2023 1427  Last data filed at 4/20/2023 1200  Gross per 24 hour   Intake 1519.92 ml   Output --   Net 1519.92 ml       Laboratory  Recent Labs     04/18/23  2352 04/19/23  0805 04/19/23  1056 04/19/23  1559 04/20/23  0021 04/20/23  1012   WBC 3.6*  --  2.2*  --  2.2*  --    RBC 3.92*  --  3.28*  --  3.26*  --    HEMOGLOBIN 11.1*   < > 9.2* 9.6* 9.2* 9.5*   HEMATOCRIT 34.1*  --  28.5*  --  28.1*  --    MCV 87.0  --  86.9  --  86.2  --    MCH 28.3  --  28.0  --  28.2  --    MCHC 32.6*  --  32.3*  --  32.7*  --    RDW 61.7*  --  61.7*  --  60.6*  --    PLATELETCT 44*  --  29*  --  41*  --    MPV 11.1  --  10.1  --  10.2  --     < > = values in this interval not displayed.     Recent Labs     04/18/23  2352 04/19/23  1056 04/20/23  0021   SODIUM 140 137 140   POTASSIUM 3.9 4.0 4.0   CHLORIDE 105 107 110   CO2 17* 20 20   GLUCOSE 75 69 84   BUN 10 12 11   CREATININE 0.34* 0.48* 0.51   CALCIUM 8.3* 7.7* 7.5*     Recent Labs     04/18/23  2223 04/18/23  2352   APTT  --  36.4*   INR 1.36 1.60*                Imaging  DX-CHEST-LIMITED (1 VIEW)   Final Result      1.  Left base pneumonia.   2.  Right base atelectasis.           Assessment/Plan  * Acute blood loss anemia- (present on admission)  Assessment & Plan  Patient presented with acute onset hematemesis, abdominal pain.  Abdomen is benign on exam.  Presumed upper GI bleed, likely variceal bleeding, given history of autoimmune hepatitis with variceal bleed 2 years ago requiring banding.  Plan: Continue treatment with octreotide, Protonix, prophylactic ceftriaxone  NPO  Hb drop from 11.4 to 9.4  GI consulted  S/p EGD with banding of esophageal varices  Hb stable  Octreotide drip      Community acquired pneumonia  Assessment & Plan  Left lower lobe pneumonia on imaging  WBC 2.2  Productive cough  Rocephin, azithromycin    Acute respiratory failure with hypoxia (HCC)  Assessment & Plan  Secondary to left lower lobe pneumonia  SpO2 84% on room air on 04/19 at 1500  antibiotics    Alcohol abuse  Assessment & Plan  Thiamine supplementation  Monitor for withdrawal    Lactic acidosis  Assessment & Plan  ?  Secondary to liver disease, alcohol abuse and GI bleed  Continue IV hydration, thiamine, repeat lactic acid    Pancytopenia (HCC)  Assessment & Plan  ?  Secondary to chronic liver disease  Follow-up with PCP    Thrombocytopenia (HCC)  Assessment & Plan  Platelets count 51, 44, 41  ?  Secondary to chronic liver disease  Repeat CBC, consider platelet transfusion if platelets count continues to drop and/or hematemesis recurs    Hepatitis  Assessment & Plan  Reported history of autoimmune hepatitis  Mild bilirubin 1.9 and AST 55 elevation noted.  Patient may have alcoholic hepatitis superimposed on autoimmune hepatitis?  Advised against drinking alcohol         VTE prophylaxis: SCDs/TEDs    I have performed a physical exam and reviewed and updated ROS and Plan today (4/20/2023). In review of yesterday's note (4/19/2023), there are no changes except as documented above.

## 2023-04-20 NOTE — CARE PLAN
Problem: Pain - Standard  Goal: Alleviation of pain or a reduction in pain to the patient’s comfort goal  Outcome: Progressing     Problem: Knowledge Deficit - Standard  Goal: Patient and family/care givers will demonstrate understanding of plan of care, disease process/condition, diagnostic tests and medications  Outcome: Progressing     Problem: Mobility  Goal: Patient's capacity to carry out activities will improve  Outcome: Progressing     The patient is Stable - Low risk of patient condition declining or worsening    Shift Goals  Clinical Goals: monitor H&H, pain control, rest  Patient Goals: pain control  Family Goals: sofie

## 2023-04-20 NOTE — PROGRESS NOTES
Assumed care of patient, received bedside report from Lenard CHASE. Patient is A&O X 4. Pain 7/10, medicated per MAR, on RA. On tele monitor, SR 82. POC discussed with patient. Patient verbalized understanding. All questions answered. Call light within reach and fall precautions in place. Bed locked and in lowest position.

## 2023-04-20 NOTE — DISCHARGE PLANNING
"                              Care Transition Team Assessment    LMSW conducted assessment with pt at [facility]. Pt AaOx4. Patient lives with three 12 year old children, her 4 year old, and S/O Messi. They have been together three years and he is pts only support. PT reports being independent with all iadls and adls at home. AD Packet given. PT states no MH/SA issues but getting in trouble with drinking before this hospital visit.     Information Source  Orientation Level: Oriented X4  Information Given By: Patient  Who is responsible for making decisions for patient? : Patient    Elopement Risk  Legal Hold: No  Ambulatory or Self Mobile in Wheelchair: Yes  Disoriented: No  Psychiatric Symptoms: None  History of Wandering: No  Elopement this Admit: No  Vocalizing Wanting to Leave: No  Displays Behaviors, Body Language Wanting to Leave: No-Not at Risk for Elopement  Elopement Risk: Not at Risk for Elopement    Interdisciplinary Discharge Planning  Primary Care Physician: sees a doctor in [place] but it has \"been a while\"  Lives with - Patient's Self Care Capacity: Significant Other, Child Less than 18 Years of Age  Patient or legal guardian wants to designate a caregiver: No  Support Systems: Children, Spouse / Significant Other  Housing / Facility: 1 Story Apartment / Condo  Able to Return to Previous ADL's: Yes  Mobility Issues: No  Durable Medical Equipment: Not Applicable    Discharge Preparedness  What is your plan after discharge?: Home with help  What are your discharge supports?: Child, Partner  Prior Functional Level: Ambulatory, Independent with Activities of Daily Living  Difficulity with ADLs: None  Difficulity with IADLs: None    Functional Assesment  Prior Functional Level: Ambulatory, Independent with Activities of Daily Living    Finances  Financial Barriers to Discharge: No  Source of Income: Social Security Disability    Vision / Hearing Impairment  Vision Impairment : No  Hearing Impairment : " No    Advance Directive  Advance Directive?: None  Advance Directive offered?: AD Booklet given    Domestic Abuse  Have you ever been the victim of abuse or violence?: No  Physical Abuse or Sexual Abuse: No  Verbal Abuse or Emotional Abuse: No  Possible Abuse/Neglect Reported to:: Not Applicable    Psychological Assessment  History of Substance Abuse: Alcohol  Date Last Used - Alcohol: 4/18/23    Discharge Risks or Barriers  Discharge risks or barriers?: No PCP  Patient risk factors: Lack of outside supports    Anticipated Discharge Information  Discharge Disposition: Discharged to home/self care (01)  Discharge Address: [address] [place] [id number]

## 2023-04-21 ENCOUNTER — PATIENT OUTREACH (OUTPATIENT)
Dept: SCHEDULING | Facility: IMAGING CENTER | Age: 38
End: 2023-04-21
Payer: MEDICARE

## 2023-04-21 LAB
ALBUMIN SERPL BCP-MCNC: 2.9 G/DL (ref 3.2–4.9)
ALBUMIN/GLOB SERPL: 1.3 G/DL
ALP SERPL-CCNC: 78 U/L (ref 30–99)
ALT SERPL-CCNC: 19 U/L (ref 2–50)
ANION GAP SERPL CALC-SCNC: 10 MMOL/L (ref 7–16)
AST SERPL-CCNC: 36 U/L (ref 12–45)
BASOPHILS # BLD AUTO: 0.3 % (ref 0–1.8)
BASOPHILS # BLD: 0.01 K/UL (ref 0–0.12)
BILIRUB SERPL-MCNC: 1.1 MG/DL (ref 0.1–1.5)
BUN SERPL-MCNC: 5 MG/DL (ref 8–22)
CALCIUM ALBUM COR SERPL-MCNC: 8.1 MG/DL (ref 8.5–10.5)
CALCIUM SERPL-MCNC: 7.2 MG/DL (ref 8.5–10.5)
CHLORIDE SERPL-SCNC: 109 MMOL/L (ref 96–112)
CO2 SERPL-SCNC: 18 MMOL/L (ref 20–33)
CREAT SERPL-MCNC: 0.44 MG/DL (ref 0.5–1.4)
EOSINOPHIL # BLD AUTO: 0.06 K/UL (ref 0–0.51)
EOSINOPHIL NFR BLD: 2.1 % (ref 0–6.9)
ERYTHROCYTE [DISTWIDTH] IN BLOOD BY AUTOMATED COUNT: 57.2 FL (ref 35.9–50)
GFR SERPLBLD CREATININE-BSD FMLA CKD-EPI: 126 ML/MIN/1.73 M 2
GLOBULIN SER CALC-MCNC: 2.3 G/DL (ref 1.9–3.5)
GLUCOSE SERPL-MCNC: 107 MG/DL (ref 65–99)
HCT VFR BLD AUTO: 27.7 % (ref 37–47)
HGB BLD-MCNC: 9.2 G/DL (ref 12–16)
IMM GRANULOCYTES # BLD AUTO: 0.01 K/UL (ref 0–0.11)
IMM GRANULOCYTES NFR BLD AUTO: 0.3 % (ref 0–0.9)
LYMPHOCYTES # BLD AUTO: 0.45 K/UL (ref 1–4.8)
LYMPHOCYTES NFR BLD: 15.7 % (ref 22–41)
MAGNESIUM SERPL-MCNC: 1.7 MG/DL (ref 1.5–2.5)
MCH RBC QN AUTO: 28 PG (ref 27–33)
MCHC RBC AUTO-ENTMCNC: 33.2 G/DL (ref 33.6–35)
MCV RBC AUTO: 84.5 FL (ref 81.4–97.8)
MONOCYTES # BLD AUTO: 0.13 K/UL (ref 0–0.85)
MONOCYTES NFR BLD AUTO: 4.5 % (ref 0–13.4)
NEUTROPHILS # BLD AUTO: 2.21 K/UL (ref 2–7.15)
NEUTROPHILS NFR BLD: 77.1 % (ref 44–72)
NRBC # BLD AUTO: 0 K/UL
NRBC BLD-RTO: 0 /100 WBC
PHOSPHATE SERPL-MCNC: 3.2 MG/DL (ref 2.5–4.5)
PLATELET # BLD AUTO: 36 K/UL (ref 164–446)
PMV BLD AUTO: 9.7 FL (ref 9–12.9)
POTASSIUM SERPL-SCNC: 3.6 MMOL/L (ref 3.6–5.5)
PROCALCITONIN SERPL-MCNC: 0.22 NG/ML
PROT SERPL-MCNC: 5.2 G/DL (ref 6–8.2)
RBC # BLD AUTO: 3.28 M/UL (ref 4.2–5.4)
SODIUM SERPL-SCNC: 137 MMOL/L (ref 135–145)
WBC # BLD AUTO: 2.9 K/UL (ref 4.8–10.8)

## 2023-04-21 PROCEDURE — 700101 HCHG RX REV CODE 250: Performed by: INTERNAL MEDICINE

## 2023-04-21 PROCEDURE — A9270 NON-COVERED ITEM OR SERVICE: HCPCS | Performed by: INTERNAL MEDICINE

## 2023-04-21 PROCEDURE — 700111 HCHG RX REV CODE 636 W/ 250 OVERRIDE (IP): Performed by: INTERNAL MEDICINE

## 2023-04-21 PROCEDURE — 83735 ASSAY OF MAGNESIUM: CPT

## 2023-04-21 PROCEDURE — 700102 HCHG RX REV CODE 250 W/ 637 OVERRIDE(OP): Performed by: INTERNAL MEDICINE

## 2023-04-21 PROCEDURE — 94760 N-INVAS EAR/PLS OXIMETRY 1: CPT

## 2023-04-21 PROCEDURE — 700111 HCHG RX REV CODE 636 W/ 250 OVERRIDE (IP): Mod: JA | Performed by: INTERNAL MEDICINE

## 2023-04-21 PROCEDURE — 84145 PROCALCITONIN (PCT): CPT

## 2023-04-21 PROCEDURE — 99232 SBSQ HOSP IP/OBS MODERATE 35: CPT | Performed by: INTERNAL MEDICINE

## 2023-04-21 PROCEDURE — 700105 HCHG RX REV CODE 258: Performed by: INTERNAL MEDICINE

## 2023-04-21 PROCEDURE — 770020 HCHG ROOM/CARE - TELE (206)

## 2023-04-21 PROCEDURE — 80053 COMPREHEN METABOLIC PANEL: CPT

## 2023-04-21 PROCEDURE — 36415 COLL VENOUS BLD VENIPUNCTURE: CPT

## 2023-04-21 PROCEDURE — 85025 COMPLETE CBC W/AUTO DIFF WBC: CPT

## 2023-04-21 PROCEDURE — 84100 ASSAY OF PHOSPHORUS: CPT

## 2023-04-21 PROCEDURE — 94640 AIRWAY INHALATION TREATMENT: CPT

## 2023-04-21 RX ADMIN — SPIRONOLACTONE 50 MG: 25 TABLET ORAL at 01:20

## 2023-04-21 RX ADMIN — ONDANSETRON 4 MG: 2 INJECTION INTRAMUSCULAR; INTRAVENOUS at 09:41

## 2023-04-21 RX ADMIN — OXYCODONE HYDROCHLORIDE 10 MG: 10 TABLET ORAL at 14:24

## 2023-04-21 RX ADMIN — IPRATROPIUM BROMIDE AND ALBUTEROL SULFATE 3 ML: .5; 2.5 SOLUTION RESPIRATORY (INHALATION) at 09:36

## 2023-04-21 RX ADMIN — ONDANSETRON 4 MG: 2 INJECTION INTRAMUSCULAR; INTRAVENOUS at 19:39

## 2023-04-21 RX ADMIN — OCTREOTIDE ACETATE 50 MCG/HR: 200 INJECTION, SOLUTION INTRAVENOUS; SUBCUTANEOUS at 01:19

## 2023-04-21 RX ADMIN — AZITHROMYCIN MONOHYDRATE 500 MG: 250 TABLET ORAL at 14:24

## 2023-04-21 RX ADMIN — OMEPRAZOLE 20 MG: 20 CAPSULE, DELAYED RELEASE ORAL at 04:29

## 2023-04-21 RX ADMIN — THIAMINE HYDROCHLORIDE 100 MG: 100 INJECTION, SOLUTION INTRAMUSCULAR; INTRAVENOUS at 04:30

## 2023-04-21 RX ADMIN — ALPRAZOLAM 1 MG: 1 TABLET ORAL at 01:15

## 2023-04-21 RX ADMIN — OXYCODONE HYDROCHLORIDE 10 MG: 10 TABLET ORAL at 19:38

## 2023-04-21 RX ADMIN — IPRATROPIUM BROMIDE AND ALBUTEROL SULFATE 3 ML: .5; 2.5 SOLUTION RESPIRATORY (INHALATION) at 14:57

## 2023-04-21 RX ADMIN — SPIRONOLACTONE 50 MG: 25 TABLET ORAL at 14:24

## 2023-04-21 RX ADMIN — OXYCODONE HYDROCHLORIDE 10 MG: 10 TABLET ORAL at 08:20

## 2023-04-21 RX ADMIN — CEFTRIAXONE SODIUM 2000 MG: 10 INJECTION, POWDER, FOR SOLUTION INTRAVENOUS at 21:24

## 2023-04-21 RX ADMIN — SODIUM CHLORIDE 1000 ML: 9 INJECTION, SOLUTION INTRAVENOUS at 01:17

## 2023-04-21 RX ADMIN — OXYCODONE HYDROCHLORIDE 10 MG: 10 TABLET ORAL at 04:25

## 2023-04-21 RX ADMIN — ONDANSETRON 4 MG: 2 INJECTION INTRAMUSCULAR; INTRAVENOUS at 04:26

## 2023-04-21 RX ADMIN — RIFAXIMIN 550 MG: 550 TABLET ORAL at 04:29

## 2023-04-21 RX ADMIN — GUAIFENESIN 600 MG: 600 TABLET, EXTENDED RELEASE ORAL at 17:29

## 2023-04-21 RX ADMIN — ALPRAZOLAM 1 MG: 1 TABLET ORAL at 21:23

## 2023-04-21 RX ADMIN — SENNOSIDES AND DOCUSATE SODIUM 2 TABLET: 50; 8.6 TABLET ORAL at 17:29

## 2023-04-21 RX ADMIN — CITALOPRAM HYDROBROMIDE 20 MG: 20 TABLET ORAL at 04:29

## 2023-04-21 RX ADMIN — IPRATROPIUM BROMIDE AND ALBUTEROL SULFATE 3 ML: .5; 2.5 SOLUTION RESPIRATORY (INHALATION) at 18:38

## 2023-04-21 RX ADMIN — BENZONATATE 100 MG: 100 CAPSULE ORAL at 04:24

## 2023-04-21 RX ADMIN — PROMETHAZINE HYDROCHLORIDE 25 MG: 25 TABLET ORAL at 08:20

## 2023-04-21 RX ADMIN — GUAIFENESIN 600 MG: 600 TABLET, EXTENDED RELEASE ORAL at 04:29

## 2023-04-21 RX ADMIN — MORPHINE SULFATE 4 MG: 4 INJECTION INTRAVENOUS at 09:41

## 2023-04-21 ASSESSMENT — ENCOUNTER SYMPTOMS
FALLS: 0
COUGH: 1
DOUBLE VISION: 0
BACK PAIN: 0
INSOMNIA: 0
BLOOD IN STOOL: 0
LOSS OF CONSCIOUSNESS: 0
ABDOMINAL PAIN: 0
SHORTNESS OF BREATH: 1
COUGH: 0
NAUSEA: 0
PND: 0
SORE THROAT: 0
FEVER: 0
HEADACHES: 0
SEIZURES: 0
CHILLS: 0
MYALGIAS: 0
WEAKNESS: 0
FLANK PAIN: 0
DIARRHEA: 0
BLURRED VISION: 0
PALPITATIONS: 0
CONSTIPATION: 0
VOMITING: 0
NERVOUS/ANXIOUS: 0
HALLUCINATIONS: 0
WHEEZING: 0
WHEEZING: 1

## 2023-04-21 ASSESSMENT — PAIN DESCRIPTION - PAIN TYPE
TYPE: ACUTE PAIN

## 2023-04-21 ASSESSMENT — FIBROSIS 4 INDEX: FIB4 SCORE: 8.72

## 2023-04-21 ASSESSMENT — LIFESTYLE VARIABLES: SUBSTANCE_ABUSE: 0

## 2023-04-21 NOTE — CARE PLAN
Problem: Pain - Standard  Goal: Alleviation of pain or a reduction in pain to the patient’s comfort goal  Outcome: Progressing     Problem: Knowledge Deficit - Standard  Goal: Patient and family/care givers will demonstrate understanding of plan of care, disease process/condition, diagnostic tests and medications  Outcome: Progressing     Problem: Psychosocial  Goal: Patient's level of anxiety will decrease  Outcome: Progressing     Problem: Communication  Goal: The ability to communicate needs accurately and effectively will improve  Outcome: Progressing   The patient is Stable - Low risk of patient condition declining or worsening    Shift Goals  Clinical Goals: pain control  Patient Goals: to tolerate food  Family Goals: sofie    Progress made toward(s) clinical / shift goals:  Plan of care discussed with patient.     Patient is not progressing towards the following goals:

## 2023-04-21 NOTE — PROGRESS NOTES
Gastroenterology Progress Note               Author:  LUIS ALBERTO Kyle Date & Time Created: 4/21/2023 1:54 PM       Patient ID:  Name:             Tonie Tineo    YOB: 1985  Age:                 37 y.o.  female  MRN:               [id number]        Medical Decision Making, by Problem:  Active Hospital Problems    Diagnosis     Community acquired pneumonia [J18.9]     Acute respiratory failure with hypoxia (HCC) [J96.01]     Acute blood loss anemia [D62]     Hepatitis [K75.9]     Thrombocytopenia (HCC) [D69.6]     Pancytopenia (HCC) [D61.818]     Lactic acidosis [E87.20]     Alcohol abuse [F10.10]            Presenting Chief Complaint:  Hematemesis     History of Present Illness:   This is a 37-year-old female with a past medical history including autoimmune hepatitis, esophageal varices s/p banding (approximately [id number]) who presented to [facility] 4/18/20[facility].  Patient reports that she does not normally drink alcohol but had 1 alcoholic beverage yesterday.  She states 6 hours after having a drink, she developed nausea and vomited a small amount of blood.  30 minutes after her initial episode of vomiting, she had an episode of hematemesis approximately 100-150 mL and continued to have a total of 7 episodes of hematemesis since.  She reports associated periumbilical abdominal pain described as poking in addition to intermittent cramping.  She also developed black, liquid stools.  She states that she could feel blood building up in her stomach prior to vomiting but that is since been alleviated with medications.  She states that she sees Dr. Reggie Leonardo (GI) in [place], N/V. Current regimen includes lactulose, rifaximin, lactulose, spironolactone and lasix. She states she's had ascites and encephalopathy in the past, was supposed to take lactulose but couldn't afford it and only takes it twice a week.  In the emergency department, hemoglobin initially 11.4, repeat  11.1, and then this morning 9.4.  MCV normocytic at 86-87.  Thrombocytopenia with 51 at 223, follow-up platelets at 2352 44.  CHEM panel with mild metabolic acidosis likely secondary to bleeding.  AST 55, ALT 28, alk phos 107, total bilirubin 1.8 ammonia was initially 37 and subsequently decreased to 34.  Mild lactic acidosis at 3.8 which downtrending to 2.7.  Ethyl alcohol 20, diagnostic alcohol less than 10.1.  INR 1.6  Patient denies fever, chills, weight loss, appetite change, dysphagia, odynophagia, heartburn.        Interval History:  4/21/2023: post procedure day #2 s/p EGD distal varices only with red tamica spots- two bands placed, portal hypertensive gastropathy. No bleeding or gastric varices. Patient seen at bedside. AAox4, reports pain resolved, no nausea, vomiting, and tolerating POs. States she had 1 episode of melena. Hgb stable. Thrombocytopenia pesists.        Hospital Medications:  Current Facility-Administered Medications   Medication Dose Frequency Provider Last Rate Last Admin    cefTRIAXone (Rocephin) syringe 2,000 mg  2,000 mg QHS Gabe Suarez D.O.   2,000 mg at 04/20/23 2110    azithromycin (ZITHROMAX) tablet 500 mg  500 mg Daily-1500 Gabe Suarez, D.O.   500 mg at 04/20/23 1500    ipratropium-albuterol (DUONEB) nebulizer solution  3 mL Q6HRS (RT) Gabe Suarez D.O.   3 mL at 04/21/23 0936    guaiFENesin ER (MUCINEX) tablet 600 mg  600 mg Q12HRS Gabe Suarez D.O.   600 mg at 04/21/23 0429    benzonatate (TESSALON) capsule 100 mg  100 mg TID PRN Gabe Suarez D.O.   100 mg at 04/21/23 0424    [START ON 4/23/2023] sulfamethoxazole-trimethoprim (BACTRIM DS) 800-160 MG tablet 1 Tablet  1 Tablet Q12HRS Gabe Suarez D.O.        omeprazole (PRILOSEC) capsule 20 mg  20 mg DAILY Gabe Suarez D.O.   20 mg at 04/21/23 0429    senna-docusate (PERICOLACE or SENOKOT S) 8.6-50 MG per tablet 2 Tablet  2 Tablet BID Abhay Guillory M.D.        And    polyethylene glycol/lytes (MIRALAX)  PACKET 1 Packet  1 Packet QDAY PRN Abhay Guillory M.D.        And    magnesium hydroxide (MILK OF MAGNESIA) suspension 30 mL  30 mL QDAY PRN Abhay Guillory M.D.        And    bisacodyl (DULCOLAX) suppository 10 mg  10 mg QDAY PRN Abhay Guillory M.D.        labetalol (NORMODYNE/TRANDATE) injection 10 mg  10 mg Q4HRS PRN Abhay Guillory M.D.        acetaminophen (Tylenol) tablet 650 mg  650 mg Q6HRS PRN Abhay Guillory M.D.        Pharmacy Consult Request ...Pain Management Review 1 Each  1 Each PHARMACY TO DOSE Abhay Guillory M.D.        oxyCODONE immediate-release (ROXICODONE) tablet 5 mg  5 mg Q3HRS PRN Abhay Guillory M.D.   5 mg at 04/19/23 1517    Or    oxyCODONE immediate release (ROXICODONE) tablet 10 mg  10 mg Q3HRS PRN Abhay Guillory M.D.   10 mg at 04/21/23 0820    Or    morphine 4 MG/ML injection 4 mg  4 mg Q3HRS PRCHANELL Guillory M.D.   4 mg at 04/21/23 0941    ondansetron (ZOFRAN) syringe/vial injection 4 mg  4 mg Q4HRS PRCHANELL Guillory M.D.   4 mg at 04/21/23 0941    ondansetron (ZOFRAN ODT) dispertab 4 mg  4 mg Q4HRS PRCHANELL Guillory M.D.        promethazine (PHENERGAN) tablet 12.5-25 mg  12.5-25 mg Q4HRS PALMIRA Guillory M.D.   25 mg at 04/21/23 0820    promethazine (PHENERGAN) suppository 12.5-25 mg  12.5-25 mg Q4HRS PRCHANELL Guillory M.D.        prochlorperazine (COMPAZINE) injection 5-10 mg  5-10 mg Q4HRS PRN Abhay Guillory M.D.        octreotide (Sandostatin) 1,250 mcg in  mL Infusion  50 mcg/hr Continuous Gabe Suarez D.O. 10 mL/hr at 04/21/23 0119 50 mcg/hr at 04/21/23 0119    ALPRAZolam (XANAX) tablet 1 mg  1 mg HS PRN Abhay Giullory M.D.   1 mg at 04/21/23 0115    citalopram (CeleXA) tablet 20 mg  20 mg DAILY Abhay Guillory M.D.   20 mg at 04/21/23 0429    riFAXIMin (XIFAXAN) tablet 550 mg  550 mg DAILY Abhay Guillory M.D.   550 mg at 04/21/23 0429    spironolactone (ALDACTONE) tablet 50 mg  50 mg BID Abhay Guillory M.D.   50  "mg at 04/21/23 0120   Last reviewed on 4/19/2023  2:06 AM by Sveta Ko, T       Review of Systems:  Review of Systems   Constitutional:  Negative for fever and malaise/fatigue.   HENT:  Negative for congestion and sore throat.    Respiratory:  Negative for cough and wheezing.    Cardiovascular:  Negative for chest pain and PND.   Gastrointestinal:  Positive for melena. Negative for abdominal pain, blood in stool, constipation, diarrhea, nausea and vomiting.   Genitourinary:  Negative for dysuria and flank pain.   Musculoskeletal:  Negative for falls and myalgias.   Neurological:  Negative for weakness and headaches.   Psychiatric/Behavioral:  Negative for substance abuse. The patient is not nervous/anxious and does not have insomnia.    All other systems reviewed and are negative.      Vital signs:  Weight/BMI: Body mass index is 25.41 kg/m².  /59   Pulse 83   Temp 37.1 °C (98.8 °F) (Temporal)   Resp 18   Ht 1.727 m (5' 8\")   Wt 75.8 kg (167 lb 1.7 oz)   SpO2 91%   Vitals:    04/21/23 0843 04/21/23 0936 04/21/23 1123 04/21/23 1200   BP: 101/63  100/59    Pulse: 69 74 83    Resp: 18 18 18    Temp: 37 °C (98.6 °F)  37.1 °C (98.8 °F)    TempSrc: Temporal  Temporal    SpO2: 95% 98% 92% 91%   Weight:       Height:         Oxygen Therapy:  Pulse Oximetry: 91 %, O2 (LPM): 1.5, O2 Delivery Device: Silicone Nasal Cannula    Intake/Output Summary (Last 24 hours) at 4/21/2023 1354  Last data filed at 4/21/2023 0900  Gross per 24 hour   Intake 662.7 ml   Output --   Net 662.7 ml         Physical Exam:  Physical Exam  Vitals and nursing note reviewed.   Constitutional:       Appearance: Normal appearance. She is overweight.   HENT:      Head: Normocephalic.      Nose: Nose normal. No congestion.      Mouth/Throat:      Mouth: Mucous membranes are moist.      Pharynx: Oropharynx is clear. No oropharyngeal exudate.   Eyes:      General: No scleral icterus.     Extraocular Movements: Extraocular movements " intact.      Conjunctiva/sclera: Conjunctivae normal.   Cardiovascular:      Rate and Rhythm: Normal rate and regular rhythm.      Pulses: Normal pulses.      Heart sounds: Normal heart sounds. No murmur heard.    No gallop.   Pulmonary:      Effort: Pulmonary effort is normal. No respiratory distress.      Breath sounds: Normal breath sounds. No wheezing.   Abdominal:      General: Abdomen is flat. Bowel sounds are normal. There is no distension.      Palpations: Abdomen is soft.      Tenderness: There is no abdominal tenderness. There is no guarding.   Musculoskeletal:      Right lower leg: No edema.      Left lower leg: No edema.   Skin:     General: Skin is warm and dry.      Capillary Refill: Capillary refill takes less than 2 seconds.      Coloration: Skin is not jaundiced.   Neurological:      General: No focal deficit present.      Mental Status: She is alert and oriented to person, place, and time. Mental status is at baseline.   Psychiatric:         Mood and Affect: Mood normal.         Behavior: Behavior normal.         Thought Content: Thought content normal.         Judgment: Judgment normal.           Labs:  Recent Labs     04/18/23 2352 04/19/23  1056 04/20/23  0021 04/21/23  0255   SODIUM 140 137 140 137   POTASSIUM 3.9 4.0 4.0 3.6   CHLORIDE 105 107 110 109   CO2 17* 20 20 18*   BUN 10 12 11 5*   CREATININE 0.34* 0.48* 0.51 0.44*   MAGNESIUM 1.7  --   --  1.7   PHOSPHORUS  --   --   --  3.2   CALCIUM 8.3* 7.7* 7.5* 7.2*     Recent Labs     04/18/23  2223 04/18/23 2352 04/19/23  1056 04/20/23  0021 04/21/23  0255   ALTSGPT 32 28 22 22 19   ASTSGOT 52* 55* 49* 48* 36   ALKPHOSPHAT 111 107* 80 78 78   TBILIRUBIN 1.9* 1.8* 1.6* 1.4 1.1   LIPASE 16 15  --   --   --    GLUCOSE 90 75 69 84 107*     Recent Labs     04/18/23 2352 04/19/23  1056 04/20/23  0021 04/21/23  0255   WBC 3.6* 2.2* 2.2* 2.9*   NEUTSPOLYS 80.20*  --  63.70 77.10*   LYMPHOCYTES 13.10*  --  24.90 15.70*   MONOCYTES 6.70  --  8.60  4.50   EOSINOPHILS 0.00  --  1.80 2.10   BASOPHILS 0.00  --  0.50 0.30   ASTSGOT 55* 49* 48* 36   ALTSGPT 28 22 22 19   ALKPHOSPHAT 107* 80 78 78   TBILIRUBIN 1.8* 1.6* 1.4 1.1     Recent Labs     04/18/23  2223 04/18/23  2352 04/19/23  0805 04/19/23  1056 04/19/23  1559 04/20/23  0021 04/20/23  1012 04/20/23  1816 04/21/23  0255   RBC 4.09* 3.92*  --  3.28*  --  3.26*  --   --  3.28*   HEMOGLOBIN 11.4* 11.1*   < > 9.2*   < > 9.2* 9.5* 9.5* 9.2*   HEMATOCRIT 35.5* 34.1*  --  28.5*  --  28.1*  --   --  27.7*   PLATELETCT 51* 44*  --  29*  --  41*  --   --  36*   PROTHROMBTM 13.9* 18.7*  --   --   --   --   --   --   --    APTT  --  36.4*  --   --   --   --   --   --   --    INR 1.36 1.60*  --   --   --   --   --   --   --     < > = values in this interval not displayed.     Recent Results (from the past 24 hour(s))   HGB (Hemoglobin) for 48 hours    Collection Time: 04/20/23  6:16 PM   Result Value Ref Range    Hemoglobin 9.5 (L) 12.0 - 16.0 g/dL   Comp Metabolic Panel    Collection Time: 04/21/23  2:55 AM   Result Value Ref Range    Sodium 137 135 - 145 mmol/L    Potassium 3.6 3.6 - 5.5 mmol/L    Chloride 109 96 - 112 mmol/L    Co2 18 (L) 20 - 33 mmol/L    Anion Gap 10.0 7.0 - 16.0    Glucose 107 (H) 65 - 99 mg/dL    Bun 5 (L) 8 - 22 mg/dL    Creatinine 0.44 (L) 0.50 - 1.40 mg/dL    Calcium 7.2 (L) 8.5 - 10.5 mg/dL    AST(SGOT) 36 12 - 45 U/L    ALT(SGPT) 19 2 - 50 U/L    Alkaline Phosphatase 78 30 - 99 U/L    Total Bilirubin 1.1 0.1 - 1.5 mg/dL    Albumin 2.9 (L) 3.2 - 4.9 g/dL    Total Protein 5.2 (L) 6.0 - 8.2 g/dL    Globulin 2.3 1.9 - 3.5 g/dL    A-G Ratio 1.3 g/dL   MAGNESIUM    Collection Time: 04/21/23  2:55 AM   Result Value Ref Range    Magnesium 1.7 1.5 - 2.5 mg/dL   PHOSPHORUS    Collection Time: 04/21/23  2:55 AM   Result Value Ref Range    Phosphorus 3.2 2.5 - 4.5 mg/dL   CBC WITH DIFFERENTIAL    Collection Time: 04/21/23  2:55 AM   Result Value Ref Range    WBC 2.9 (L) 4.8 - 10.8 K/uL    RBC 3.28 (L)  4.20 - 5.40 M/uL    Hemoglobin 9.2 (L) 12.0 - 16.0 g/dL    Hematocrit 27.7 (L) 37.0 - 47.0 %    MCV 84.5 81.4 - 97.8 fL    MCH 28.0 27.0 - 33.0 pg    MCHC 33.2 (L) 33.6 - 35.0 g/dL    RDW 57.2 (H) 35.9 - 50.0 fL    Platelet Count 36 (L) 164 - 446 K/uL    MPV 9.7 9.0 - 12.9 fL    Neutrophils-Polys 77.10 (H) 44.00 - 72.00 %    Lymphocytes 15.70 (L) 22.00 - 41.00 %    Monocytes 4.50 0.00 - 13.40 %    Eosinophils 2.10 0.00 - 6.90 %    Basophils 0.30 0.00 - 1.80 %    Immature Granulocytes 0.30 0.00 - 0.90 %    Nucleated RBC 0.00 /100 WBC    Neutrophils (Absolute) 2.21 2.00 - 7.15 K/uL    Lymphs (Absolute) 0.45 (L) 1.00 - 4.80 K/uL    Monos (Absolute) 0.13 0.00 - 0.85 K/uL    Eos (Absolute) 0.06 0.00 - 0.51 K/uL    Baso (Absolute) 0.01 0.00 - 0.12 K/uL    Immature Granulocytes (abs) 0.01 0.00 - 0.11 K/uL    NRBC (Absolute) 0.00 K/uL   PROCALCITONIN    Collection Time: 04/21/23  2:55 AM   Result Value Ref Range    Procalcitonin 0.22 <0.25 ng/mL   CORRECTED CALCIUM    Collection Time: 04/21/23  2:55 AM   Result Value Ref Range    Correct Calcium 8.1 (L) 8.5 - 10.5 mg/dL   ESTIMATED GFR    Collection Time: 04/21/23  2:55 AM   Result Value Ref Range    GFR (CKD-EPI) 126 >60 mL/min/1.73 m 2       Radiology Review:  DX-CHEST-LIMITED (1 VIEW)   Final Result      1.  Left base pneumonia.   2.  Right base atelectasis.            MDM (Data Review):   -Records reviewed and summarized in current documentation  -I personally reviewed and interpreted the laboratory results  -I personally reviewed the radiology images    Assessment/Recommendations:  Impressions:  Hematemesis  Acute blood loss anemia secondary to hematemesis  Autoimmune hepatitis  Severe thrombocytopenia  Hyperammonemia-downtrending      MDM:  This is a pleasant 37-year-old female with a past medical history including autoimmune hepatitis and esophageal varices s/p banding who presents with multiple episodes of hematemesis.  s/p EGD 4/19/2023 post procedure day #2 s/p  EGD distal varices only with red tamica spots- two bands placed, portal hypertensive gastropathy. No bleeding or gastric varices.  Patient reports 1 episode of melena but this is likely from residual blood passing.  She remains thrombocytopenic.  Discussed previous treatment of autoimmune hepatitis.  Patient states that she was previously on an unknown therapy through Dr. Reggie Mix [place] who is working in conjunction with [facility] regarding patient's autoimmune hepatitis.  She states that through those teams, patient is on the liver transplant list.  However, due to weight for liver, they reportedly stopped her therapy for autoimmune hepatitis in order to expedite her getting a liver transplant.  She states that she is unable to travel to [place] to continue to see Dr. Leonardo and has not established with primary care or GI locally.  She is well aware of the importance of being established with such specialist.  Upon establishing with GI locally, patient very well may require a repeat liver biopsy to confirm autoimmune hepatitis.  Given her severe thrombocytopenia, this may likely need to be obtained through transjugular route due to risk for bleeding.  This can be done on an outpatient basis.        Recommendations:     Monitor h/h-  Hgb goal >7g (>9 in setting of active chest pain, ACS), INR <2, plat >50 with active bleeding.   Recommend Spirinolactone 50mg, lasix 20mg as BP tolerates.  Recommend lactulose- titrate to 2-3 BM's per day.   Continue rifaximin.   Continue PPI IV BID  Consider transition from octreotide to NSBB if blood pressure allows with diurectics.   Referral to outpatient GI, specifically hepatology placed.  Contact [facility]  to facilitate patient getting established with primary care provider.      Otherwise at this time, no further interventions from acute GI team.  GI to sign off.  Please reconsult for any further questions or concerns.      Discussed with patient, Dr. Suarez,   [place]      Core Quality Measures   Reviewed items::  Labs, Medications and Radiology reports reviewed

## 2023-04-21 NOTE — CARE PLAN
The patient is Watcher - Medium risk of patient condition declining or worsening    Shift Goals  Clinical Goals: monitor H&H, pain management  Patient Goals: pain control  Family Goals: sofie    Progress made toward(s) clinical / shift goals:    Problem: Pain - Standard  Goal: Alleviation of pain or a reduction in pain to the patient’s comfort goal  Outcome: Progressing     Problem: Psychosocial  Goal: Patient's level of anxiety will decrease  Outcome: Progressing     Problem: Hemodynamics  Goal: Patient's hemodynamics, fluid balance and neurologic status will be stable or improve  Outcome: Progressing     Problem: Gastrointestinal Irritability  Goal: Nausea and vomiting will be absent or improve  Outcome: Progressing     Problem: Mobility  Goal: Patient's capacity to carry out activities will improve  Outcome: Progressing     Note: Patient continues to complain of throat pain and upper back pain. Patient's pain is managed per MAR.  Patient's anxiety improves when her pain is manageable. Her blood pressure has been stable throughout shift. Her nausea was treated per MAR. Patient ambulates and tolerates it well.    Patient is not progressing towards the following goals:

## 2023-04-21 NOTE — PROGRESS NOTES
[facility] Medicine Daily Progress Note    Date of Service  4/21/2023    Chief Complaint  Tonie Tineo is a 37 y.o. female admitted 4/18/2023 with hematemesis    Hospital Course  No notes on file    Interval Problem Update  Patient was seen and examined at bedside.  No acute events overnight. Patient is resting comfortably in bed and in no acute distress.     S/p EGD with variceal banding  Antibiotics for CAP  Supplemental oxygen 1L    I have discussed this patient's plan of care and discharge plan at IDT rounds today with Case Management, Nursing, Nursing leadership, and other members of the IDT team.    Consultants/Specialty  GI    Code Status  Full Code    Disposition  Patient is not medically cleared for discharge.   Anticipate discharge to to home with close outpatient follow-up.  I have placed the appropriate orders for post-discharge needs.    Review of Systems  Review of Systems   Constitutional:  Negative for chills and fever.   HENT:  Positive for congestion. Negative for sore throat.    Eyes:  Negative for blurred vision and double vision.   Respiratory:  Positive for cough, shortness of breath and wheezing.    Cardiovascular:  Negative for chest pain and palpitations.   Gastrointestinal:  Negative for abdominal pain, nausea and vomiting.   Genitourinary:  Negative for dysuria and frequency.   Musculoskeletal:  Negative for back pain and falls.   Skin:  Negative for rash.   Neurological:  Negative for seizures and loss of consciousness.   Psychiatric/Behavioral:  Negative for hallucinations and substance abuse.       Physical Exam  Temp:  [37 °C (98.6 °F)-37.3 °C (99.1 °F)] 37.1 °C (98.8 °F)  Pulse:  [68-83] 83  Resp:  [14-18] 18  BP: ()/(59-74) 100/59  SpO2:  [90 %-98 %] 91 %    Physical Exam  Vitals and nursing note reviewed.   Constitutional:       General: She is not in acute distress.     Appearance: She is ill-appearing. She is not toxic-appearing.      Comments: conversational   HENT:       Head: Normocephalic.      Right Ear: External ear normal.      Left Ear: External ear normal.      Nose: No congestion.      Mouth/Throat:      Mouth: Mucous membranes are moist.      Pharynx: No oropharyngeal exudate.   Eyes:      General: No scleral icterus.     Pupils: Pupils are equal, round, and reactive to light.   Cardiovascular:      Rate and Rhythm: Normal rate and regular rhythm.      Heart sounds: No murmur heard.  Pulmonary:      Breath sounds: Rhonchi present.   Abdominal:      Tenderness: There is abdominal tenderness. There is no guarding or rebound.   Skin:     Findings: No bruising.   Neurological:      General: No focal deficit present.      Mental Status: She is alert and oriented to person, place, and time.      Cranial Nerves: No cranial nerve deficit.   Psychiatric:         Mood and Affect: Mood normal.         Behavior: Behavior normal.       Fluids    Intake/Output Summary (Last 24 hours) at 4/21/2023 1326  Last data filed at 4/21/2023 0900  Gross per 24 hour   Intake 662.7 ml   Output --   Net 662.7 ml       Laboratory  Recent Labs     04/19/23  1056 04/19/23  1559 04/20/23  0021 04/20/23  1012 04/20/23  1816 04/21/23  0255   WBC 2.2*  --  2.2*  --   --  2.9*   RBC 3.28*  --  3.26*  --   --  3.28*   HEMOGLOBIN 9.2*   < > 9.2* 9.5* 9.5* 9.2*   HEMATOCRIT 28.5*  --  28.1*  --   --  27.7*   MCV 86.9  --  86.2  --   --  84.5   MCH 28.0  --  28.2  --   --  28.0   MCHC 32.3*  --  32.7*  --   --  33.2*   RDW 61.7*  --  60.6*  --   --  57.2*   PLATELETCT 29*  --  41*  --   --  36*   MPV 10.1  --  10.2  --   --  9.7    < > = values in this interval not displayed.     Recent Labs     04/19/23  1056 04/20/23  0021 04/21/23  0255   SODIUM 137 140 137   POTASSIUM 4.0 4.0 3.6   CHLORIDE 107 110 109   CO2 20 20 18*   GLUCOSE 69 84 107*   BUN 12 11 5*   CREATININE 0.48* 0.51 0.44*   CALCIUM 7.7* 7.5* 7.2*     Recent Labs     04/18/23 2223 04/18/23  2352   APTT  --  36.4*   INR 1.36 1.60*                Imaging  DX-CHEST-LIMITED (1 VIEW)   Final Result      1.  Left base pneumonia.   2.  Right base atelectasis.           Assessment/Plan  * Acute blood loss anemia- (present on admission)  Assessment & Plan  Patient presented with acute onset hematemesis, abdominal pain.  Abdomen is benign on exam.  Presumed upper GI bleed, likely variceal bleeding, given history of autoimmune hepatitis with variceal bleed 2 years ago requiring banding.  Hb drop from 11.4 to 9.4  GI consulted  S/p EGD with banding of esophageal varices  Hb stable    Acute respiratory failure with hypoxia (HCC)  Assessment & Plan  Secondary to left lower lobe pneumonia  SpO2 84% on room air on 04/19 at 1500  antibiotics    Community acquired pneumonia  Assessment & Plan  Left lower lobe pneumonia on imaging  WBC 2.2 --> 2.9  Productive cough  Rocephin, azithromycin    Alcohol abuse  Assessment & Plan  Thiamine supplementation  Monitor for withdrawal    Lactic acidosis  Assessment & Plan  ?  Secondary to liver disease, alcohol abuse and GI bleed  Continue IV hydration, thiamine, repeat lactic acid    Pancytopenia (HCC)  Assessment & Plan  ?  Secondary to chronic liver disease  Follow-up with PCP    Thrombocytopenia (HCC)  Assessment & Plan  In setting of liver disease  Plt 36    Hepatitis  Assessment & Plan  Reported history of autoimmune hepatitis  Mild bilirubin 1.9 and AST 55 elevation noted.  Patient may have alcoholic hepatitis superimposed on autoimmune hepatitis?  Advised against drinking alcohol  Referral to establish with GI placed         VTE prophylaxis: SCDs/TEDs    I have performed a physical exam and reviewed and updated ROS and Plan today (4/21/2023). In review of yesterday's note (4/20/2023), there are no changes except as documented above.

## 2023-04-21 NOTE — DISCHARGE PLANNING
Case Management Discharge Planning    Admission Date: 4/18/2023  GMLOS: 3  ALOS: 2    6-Clicks ADL Score: 24  6-Clicks Mobility Score: 21      Anticipated Discharge Dispo: Discharge Disposition: Discharged to home/self care (01)  Discharge Address: [address] [place] [id number]    DME Needed: No    Action(s) Taken: LMSW spoke with pt at bedside after interdisciplinary rounds. LMSW made pt aware of possible discharge for tomorrow and confirmed that pt's s/o will be able to provide transport home. Pt states later in the day would be better.     Escalations Completed: None    Medically Clear: No    Next Steps: F/U regarding discharge    Barriers to Discharge: Medical clearance

## 2023-04-21 NOTE — CARE PLAN
Problem: Bronchoconstriction  Goal: Improve in air movement and diminished wheezing  Description: Target End Date:  2 to 3 days    1.  Implement inhaled treatments  2.  Evaluate and manage medication effects  Outcome: Progressing       Respiratory Update    Treatment modality:  DUONEB via SVN   Frequency: Q 6 hour    Pt tolerating current treatments well with no adverse reactions.

## 2023-04-21 NOTE — DOCUMENTATION QUERY
[facility]                                                                       Query Response Note      PATIENT:               RADHA STRINGER  ACCT #:                  [id number]  MRN:                     [id number]  :                      1985  ADMIT DATE:       2023 11:50 PM  DISCH DATE:          RESPONDING  PROVIDER #:        [id number]           QUERY TEXT:    Documentation in the medical record indicates patient has been diagnosed with community acquired pneumonia.    Please specify the Present on Admission (POA) status of community acquired pneumonia based on the clinical indicators.    The patient's Clinical Indicators include:     37 y.o. F w/ hx autoimmune hepatitis/variceal bleeding w/ banding 2 yrs ago  [place] [facility] [place] 2 episodes of hematemesis about 1 to 200 cc of blood.   H&P: GIB, etoh abuse, lactic acidosis, pancytopenia, autoimmune hepatitis  WBC: 3.8; Lactic Acid: 3.8     EGD: Esophageal varices - band x 2, portal htn gastropathy      CXR: Left base pneumonia. Right base atelectasis.  HM PN: Community acquired pneumonia, acute resp failure w/ hypoxia     Procalcitonin: 0.22    Treatment: IV Rocephin; PO Zithromax; Mucinex; Duoneb; O2; lab/imaging  Risk Factors: Vomiting; autoimmune hepatitis; variceal bleeding; etoh  abuse    Thank You,  Malathi Schreiber RN  Clinical    Connect via [employer]  Options provided:   -- Community acquired pneumonia, POA   -- Community acquired pneumonia, Not POA   -- Community acquired pneumonia, Clinically Unable to Determine POA status   -- Other explanation, (please specify other explanation)   -- Unable to determine      Query created by: Malathi Schreiber on 2023 1:29 PM    RESPONSE TEXT:    Community acquired pneumonia, POA          Electronically signed by:  TIA VALLES MD 2023 1:31 PM

## 2023-04-22 LAB
ALBUMIN SERPL BCP-MCNC: 3.3 G/DL (ref 3.2–4.9)
ALBUMIN/GLOB SERPL: 1.4 G/DL
ALP SERPL-CCNC: 82 U/L (ref 30–99)
ALT SERPL-CCNC: 23 U/L (ref 2–50)
AMMONIA PLAS-SCNC: 62 UMOL/L (ref 11–45)
ANION GAP SERPL CALC-SCNC: 13 MMOL/L (ref 7–16)
AST SERPL-CCNC: 34 U/L (ref 12–45)
BASOPHILS # BLD AUTO: 0.2 % (ref 0–1.8)
BASOPHILS # BLD: 0.01 K/UL (ref 0–0.12)
BILIRUB SERPL-MCNC: 1 MG/DL (ref 0.1–1.5)
BUN SERPL-MCNC: 3 MG/DL (ref 8–22)
CALCIUM ALBUM COR SERPL-MCNC: 7.9 MG/DL (ref 8.5–10.5)
CALCIUM SERPL-MCNC: 7.3 MG/DL (ref 8.5–10.5)
CHLORIDE SERPL-SCNC: 103 MMOL/L (ref 96–112)
CO2 SERPL-SCNC: 19 MMOL/L (ref 20–33)
CREAT SERPL-MCNC: 0.48 MG/DL (ref 0.5–1.4)
EOSINOPHIL # BLD AUTO: 0.1 K/UL (ref 0–0.51)
EOSINOPHIL NFR BLD: 2.2 % (ref 0–6.9)
ERYTHROCYTE [DISTWIDTH] IN BLOOD BY AUTOMATED COUNT: 59.8 FL (ref 35.9–50)
GFR SERPLBLD CREATININE-BSD FMLA CKD-EPI: 124 ML/MIN/1.73 M 2
GLOBULIN SER CALC-MCNC: 2.4 G/DL (ref 1.9–3.5)
GLUCOSE SERPL-MCNC: 110 MG/DL (ref 65–99)
HCT VFR BLD AUTO: 29.4 % (ref 37–47)
HGB BLD-MCNC: 9.2 G/DL (ref 12–16)
IMM GRANULOCYTES # BLD AUTO: 0.01 K/UL (ref 0–0.11)
IMM GRANULOCYTES NFR BLD AUTO: 0.2 % (ref 0–0.9)
LACTATE SERPL-SCNC: 2.2 MMOL/L (ref 0.5–2)
LYMPHOCYTES # BLD AUTO: 0.4 K/UL (ref 1–4.8)
LYMPHOCYTES NFR BLD: 8.9 % (ref 22–41)
MCH RBC QN AUTO: 27.7 PG (ref 27–33)
MCHC RBC AUTO-ENTMCNC: 31.3 G/DL (ref 33.6–35)
MCV RBC AUTO: 88.6 FL (ref 81.4–97.8)
MONOCYTES # BLD AUTO: 0.24 K/UL (ref 0–0.85)
MONOCYTES NFR BLD AUTO: 5.3 % (ref 0–13.4)
NEUTROPHILS # BLD AUTO: 3.74 K/UL (ref 2–7.15)
NEUTROPHILS NFR BLD: 83.2 % (ref 44–72)
NRBC # BLD AUTO: 0 K/UL
NRBC BLD-RTO: 0 /100 WBC
PLATELET # BLD AUTO: 51 K/UL (ref 164–446)
PMV BLD AUTO: 9.7 FL (ref 9–12.9)
POTASSIUM SERPL-SCNC: 3.8 MMOL/L (ref 3.6–5.5)
PROT SERPL-MCNC: 5.7 G/DL (ref 6–8.2)
RBC # BLD AUTO: 3.32 M/UL (ref 4.2–5.4)
SODIUM SERPL-SCNC: 135 MMOL/L (ref 135–145)
WBC # BLD AUTO: 4.5 K/UL (ref 4.8–10.8)

## 2023-04-22 PROCEDURE — 83605 ASSAY OF LACTIC ACID: CPT

## 2023-04-22 PROCEDURE — A9270 NON-COVERED ITEM OR SERVICE: HCPCS | Performed by: INTERNAL MEDICINE

## 2023-04-22 PROCEDURE — 770020 HCHG ROOM/CARE - TELE (206)

## 2023-04-22 PROCEDURE — 700102 HCHG RX REV CODE 250 W/ 637 OVERRIDE(OP): Performed by: INTERNAL MEDICINE

## 2023-04-22 PROCEDURE — 87040 BLOOD CULTURE FOR BACTERIA: CPT

## 2023-04-22 PROCEDURE — 36415 COLL VENOUS BLD VENIPUNCTURE: CPT

## 2023-04-22 PROCEDURE — A9270 NON-COVERED ITEM OR SERVICE: HCPCS

## 2023-04-22 PROCEDURE — 700101 HCHG RX REV CODE 250: Performed by: INTERNAL MEDICINE

## 2023-04-22 PROCEDURE — 85025 COMPLETE CBC W/AUTO DIFF WBC: CPT

## 2023-04-22 PROCEDURE — 700111 HCHG RX REV CODE 636 W/ 250 OVERRIDE (IP): Performed by: INTERNAL MEDICINE

## 2023-04-22 PROCEDURE — 700111 HCHG RX REV CODE 636 W/ 250 OVERRIDE (IP): Performed by: STUDENT IN AN ORGANIZED HEALTH CARE EDUCATION/TRAINING PROGRAM

## 2023-04-22 PROCEDURE — 700102 HCHG RX REV CODE 250 W/ 637 OVERRIDE(OP)

## 2023-04-22 PROCEDURE — 80053 COMPREHEN METABOLIC PANEL: CPT

## 2023-04-22 PROCEDURE — 94640 AIRWAY INHALATION TREATMENT: CPT

## 2023-04-22 PROCEDURE — 99232 SBSQ HOSP IP/OBS MODERATE 35: CPT | Performed by: STUDENT IN AN ORGANIZED HEALTH CARE EDUCATION/TRAINING PROGRAM

## 2023-04-22 PROCEDURE — 82140 ASSAY OF AMMONIA: CPT

## 2023-04-22 RX ORDER — SULFAMETHOXAZOLE AND TRIMETHOPRIM 800; 160 MG/1; MG/1
1 TABLET ORAL EVERY 12 HOURS
Status: DISCONTINUED | OUTPATIENT
Start: 2023-04-24 | End: 2023-04-23

## 2023-04-22 RX ORDER — LACTULOSE 20 G/30ML
30 SOLUTION ORAL 3 TIMES DAILY
Status: DISCONTINUED | OUTPATIENT
Start: 2023-04-22 | End: 2023-04-24

## 2023-04-22 RX ADMIN — GUAIFENESIN 600 MG: 600 TABLET, EXTENDED RELEASE ORAL at 05:23

## 2023-04-22 RX ADMIN — AZITHROMYCIN MONOHYDRATE 500 MG: 250 TABLET ORAL at 14:25

## 2023-04-22 RX ADMIN — LACTULOSE 30 ML: 20 SOLUTION ORAL at 17:34

## 2023-04-22 RX ADMIN — OXYCODONE HYDROCHLORIDE 10 MG: 10 TABLET ORAL at 08:33

## 2023-04-22 RX ADMIN — OXYCODONE HYDROCHLORIDE 10 MG: 10 TABLET ORAL at 14:25

## 2023-04-22 RX ADMIN — ALPRAZOLAM 1 MG: 1 TABLET ORAL at 21:53

## 2023-04-22 RX ADMIN — BENZONATATE 100 MG: 100 CAPSULE ORAL at 21:53

## 2023-04-22 RX ADMIN — RIFAXIMIN 550 MG: 550 TABLET ORAL at 05:22

## 2023-04-22 RX ADMIN — SPIRONOLACTONE 50 MG: 25 TABLET ORAL at 14:25

## 2023-04-22 RX ADMIN — CEFTRIAXONE SODIUM 2000 MG: 10 INJECTION, POWDER, FOR SOLUTION INTRAVENOUS at 21:53

## 2023-04-22 RX ADMIN — ACETAMINOPHEN 650 MG: 325 TABLET, FILM COATED ORAL at 11:54

## 2023-04-22 RX ADMIN — ONDANSETRON 4 MG: 2 INJECTION INTRAMUSCULAR; INTRAVENOUS at 14:28

## 2023-04-22 RX ADMIN — OXYCODONE HYDROCHLORIDE 10 MG: 10 TABLET ORAL at 02:01

## 2023-04-22 RX ADMIN — SPIRONOLACTONE 50 MG: 25 TABLET ORAL at 02:00

## 2023-04-22 RX ADMIN — IPRATROPIUM BROMIDE AND ALBUTEROL SULFATE 3 ML: .5; 2.5 SOLUTION RESPIRATORY (INHALATION) at 02:41

## 2023-04-22 RX ADMIN — LACTULOSE 30 ML: 20 SOLUTION ORAL at 11:14

## 2023-04-22 RX ADMIN — ACETAMINOPHEN 650 MG: 325 TABLET, FILM COATED ORAL at 05:25

## 2023-04-22 RX ADMIN — OXYCODONE HYDROCHLORIDE 10 MG: 10 TABLET ORAL at 21:54

## 2023-04-22 RX ADMIN — OMEPRAZOLE 20 MG: 20 CAPSULE, DELAYED RELEASE ORAL at 05:22

## 2023-04-22 RX ADMIN — LACTULOSE 30 ML: 20 SOLUTION ORAL at 05:22

## 2023-04-22 RX ADMIN — CITALOPRAM HYDROBROMIDE 20 MG: 20 TABLET ORAL at 05:22

## 2023-04-22 RX ADMIN — GUAIFENESIN 600 MG: 600 TABLET, EXTENDED RELEASE ORAL at 17:34

## 2023-04-22 ASSESSMENT — PAIN DESCRIPTION - PAIN TYPE
TYPE: ACUTE PAIN

## 2023-04-22 ASSESSMENT — FIBROSIS 4 INDEX: FIB4 SCORE: 5.28

## 2023-04-22 NOTE — PROGRESS NOTES
Hospital Medicine Daily Progress Note    Date of Service  4/22/2023    Chief Complaint  Tonie Tineo is a 37 y.o. female admitted 4/18/2023 with hemoptysis    Hospital Course  Started feeling about medical history of autoimmune hepatitis, history of bleeding presented with hemoptysis.  Evaluated today status post EGD with banding.  Patient with autoimmune hepatitis and she is on liver transplant list.    Interval Problem Update  4/22/2023  On 2 L oxygen saturating over 90  Remain febrile, likely from out of pneumonitis/community-acquired pneumonia  Labs reviewed  Repeat blood culture in process  Continue incentive spirometry  Continue wean of oxygen    Continue on Rocephin, azithromycin  Monitor blood culture    I have discussed this patient's plan of care and discharge plan at IDT rounds today with Case Management, Nursing, Nursing leadership, and other members of the IDT team.    Consultants/Specialty  GI    Code Status  Full Code    Disposition  Patient is not medically cleared for discharge.   Anticipate discharge to to home with close outpatient follow-up.  I have placed the appropriate orders for post-discharge needs.    Review of Systems  Review of Systems   Constitutional:  Positive for malaise/fatigue.      Physical Exam  Temp:  [36.8 °C (98.2 °F)-38.7 °C (101.7 °F)] 38.1 °C (100.6 °F)  Pulse:  [79-96] 87  Resp:  [18] 18  BP: (104-123)/(61-73) 105/63  SpO2:  [88 %-100 %] 88 %    Physical Exam  Constitutional:       Appearance: Normal appearance.   HENT:      Head:      Comments: On 2l NC      Fluids    Intake/Output Summary (Last 24 hours) at 4/22/2023 1409  Last data filed at 4/22/2023 1151  Gross per 24 hour   Intake 120 ml   Output --   Net 120 ml       Laboratory  Recent Labs     04/20/23  0021 04/20/23  1012 04/20/23  1816 04/21/23  0255 04/22/23  0036   WBC 2.2*  --   --  2.9* 4.5*   RBC 3.26*  --   --  3.28* 3.32*   HEMOGLOBIN 9.2*   < > 9.5* 9.2* 9.2*   HEMATOCRIT 28.1*  --   --  27.7* 29.4*    MCV 86.2  --   --  84.5 88.6   MCH 28.2  --   --  28.0 27.7   MCHC 32.7*  --   --  33.2* 31.3*   RDW 60.6*  --   --  57.2* 59.8*   PLATELETCT 41*  --   --  36* 51*   MPV 10.2  --   --  9.7 9.7    < > = values in this interval not displayed.     Recent Labs     04/20/23  0021 04/21/23  0255 04/22/23  0036   SODIUM 140 137 135   POTASSIUM 4.0 3.6 3.8   CHLORIDE 110 109 103   CO2 20 18* 19*   GLUCOSE 84 107* 110*   BUN 11 5* 3*   CREATININE 0.51 0.44* 0.48*   CALCIUM 7.5* 7.2* 7.3*                   Imaging  DX-CHEST-LIMITED (1 VIEW)   Final Result      1.  Left base pneumonia.   2.  Right base atelectasis.           Assessment/Plan  * Acute blood loss anemia- (present on admission)  Assessment & Plan  Patient presented with acute onset hematemesis, abdominal pain.  Abdomen is benign on exam.  Presumed upper GI bleed, likely variceal bleeding, given history of autoimmune hepatitis with variceal bleed 2 years ago requiring banding.  Hb drop from 11.4 to 9.4  GI consulted  S/p EGD with banding of esophageal varices  Hb stable    Acute respiratory failure with hypoxia (HCC)  Assessment & Plan  Secondary to left lower lobe pneumonia  SpO2 84% on room air on 04/19 at 1500  antibiotics    Community acquired pneumonia  Assessment & Plan  Left lower lobe pneumonia on imaging  WBC 2.2 --> 2.9  Productive cough  Rocephin, azithromycin    Alcohol abuse  Assessment & Plan  Thiamine supplementation  Monitor for withdrawal    Lactic acidosis  Assessment & Plan  ?  Secondary to liver disease, alcohol abuse and GI bleed  Continue IV hydration, thiamine, repeat lactic acid    Pancytopenia (HCC)  Assessment & Plan  ?  Secondary to chronic liver disease  Follow-up with PCP    Thrombocytopenia (HCC)  Assessment & Plan  In setting of liver disease  Plt 36    Hepatitis  Assessment & Plan  Reported history of autoimmune hepatitis  Mild bilirubin 1.9 and AST 55 elevation noted.  Patient may have alcoholic hepatitis superimposed on  autoimmune hepatitis?  Advised against drinking alcohol  Referral to establish with GI placed         VTE prophylaxis: SCDs/TEDs    I have performed a physical exam and reviewed and updated ROS and Plan today (4/22/2023). In review of yesterday's note (4/21/2023), there are no changes except as documented above.

## 2023-04-22 NOTE — CARE PLAN
Problem: Pain - Standard  Goal: Alleviation of pain or a reduction in pain to the patient’s comfort goal  Outcome: Progressing     Problem: Knowledge Deficit - Standard  Goal: Patient and family/care givers will demonstrate understanding of plan of care, disease process/condition, diagnostic tests and medications  Outcome: Progressing     Problem: Psychosocial  Goal: Patient's level of anxiety will decrease  Outcome: Progressing     Problem: Communication  Goal: The ability to communicate needs accurately and effectively will improve  Outcome: Progressing   The patient is Watcher - Medium risk of patient condition declining or worsening    Shift Goals  Clinical Goals: wear o2  Patient Goals: pain control  Family Goals: sofie    Progress made toward(s) clinical / shift goals:  Plan of care discussed with patient. Patient demonstrates using IS effectively.     Patient is not progressing towards the following goals:

## 2023-04-22 NOTE — CARE PLAN
The patient is Watcher - Medium risk of patient condition declining or worsening    Shift Goals  Clinical Goals: Antibiotic treatment, pain management  Patient Goals: pain control, sleep  Family Goals: sofie    Progress made toward(s) clinical / shift goals:    Problem: Pain - Standard  Goal: Alleviation of pain or a reduction in pain to the patient’s comfort goal  Outcome: Progressing     Problem: Knowledge Deficit - Standard  Goal: Patient and family/care givers will demonstrate understanding of plan of care, disease process/condition, diagnostic tests and medications  Outcome: Progressing     Problem: Hemodynamics  Goal: Patient's hemodynamics, fluid balance and neurologic status will be stable or improve  Outcome: Progressing     Problem: Respiratory  Goal: Patient will achieve/maintain optimum respiratory ventilation and gas exchange  Outcome: Progressing     Problem: Nutrition  Goal: Patient's nutritional and fluid intake will be adequate or improve  Outcome: Progressing     Problem: Urinary Elimination  Goal: Establish and maintain regular urinary output  Outcome: Progressing     Problem: Bowel Elimination  Goal: Establish and maintain regular bowel function  Outcome: Progressing       Patient is not progressing towards the following goals:

## 2023-04-23 ENCOUNTER — APPOINTMENT (OUTPATIENT)
Dept: RADIOLOGY | Facility: MEDICAL CENTER | Age: 38
DRG: 432 | End: 2023-04-23
Attending: STUDENT IN AN ORGANIZED HEALTH CARE EDUCATION/TRAINING PROGRAM
Payer: MEDICARE

## 2023-04-23 PROBLEM — A41.9 SEPSIS (HCC): Status: ACTIVE | Noted: 2023-04-23

## 2023-04-23 LAB
AMMONIA PLAS-SCNC: 35 UMOL/L (ref 11–45)
AMPHET UR QL SCN: NEGATIVE
ANION GAP SERPL CALC-SCNC: 10 MMOL/L (ref 7–16)
APPEARANCE UR: CLEAR
B PARAP IS1001 DNA NPH QL NAA+NON-PROBE: NOT DETECTED
B PERT.PT PRMT NPH QL NAA+NON-PROBE: NOT DETECTED
BACTERIA #/AREA URNS HPF: ABNORMAL /HPF
BARBITURATES UR QL SCN: NEGATIVE
BASOPHILS # BLD AUTO: 0.2 % (ref 0–1.8)
BASOPHILS # BLD: 0.01 K/UL (ref 0–0.12)
BENZODIAZ UR QL SCN: NEGATIVE
BILIRUB UR QL STRIP.AUTO: NEGATIVE
BUN SERPL-MCNC: <2 MG/DL (ref 8–22)
BZE UR QL SCN: NEGATIVE
C PNEUM DNA NPH QL NAA+NON-PROBE: NOT DETECTED
CALCIUM SERPL-MCNC: 7.9 MG/DL (ref 8.5–10.5)
CANNABINOIDS UR QL SCN: NEGATIVE
CHLORIDE SERPL-SCNC: 102 MMOL/L (ref 96–112)
CO2 SERPL-SCNC: 21 MMOL/L (ref 20–33)
COLOR UR: YELLOW
CREAT SERPL-MCNC: 0.44 MG/DL (ref 0.5–1.4)
EOSINOPHIL # BLD AUTO: 0.09 K/UL (ref 0–0.51)
EOSINOPHIL NFR BLD: 1.5 % (ref 0–6.9)
EPI CELLS #/AREA URNS HPF: ABNORMAL /HPF
ERYTHROCYTE [DISTWIDTH] IN BLOOD BY AUTOMATED COUNT: 61.6 FL (ref 35.9–50)
FLUAV RNA NPH QL NAA+NON-PROBE: NOT DETECTED
FLUBV RNA NPH QL NAA+NON-PROBE: NOT DETECTED
GFR SERPLBLD CREATININE-BSD FMLA CKD-EPI: 126 ML/MIN/1.73 M 2
GLUCOSE SERPL-MCNC: 95 MG/DL (ref 65–99)
GLUCOSE UR STRIP.AUTO-MCNC: NEGATIVE MG/DL
HADV DNA NPH QL NAA+NON-PROBE: NOT DETECTED
HCOV 229E RNA NPH QL NAA+NON-PROBE: NOT DETECTED
HCOV HKU1 RNA NPH QL NAA+NON-PROBE: NOT DETECTED
HCOV NL63 RNA NPH QL NAA+NON-PROBE: NOT DETECTED
HCOV OC43 RNA NPH QL NAA+NON-PROBE: NOT DETECTED
HCT VFR BLD AUTO: 31.1 % (ref 37–47)
HGB BLD-MCNC: 9.8 G/DL (ref 12–16)
HMPV RNA NPH QL NAA+NON-PROBE: DETECTED
HPIV1 RNA NPH QL NAA+NON-PROBE: NOT DETECTED
HPIV2 RNA NPH QL NAA+NON-PROBE: NOT DETECTED
HPIV3 RNA NPH QL NAA+NON-PROBE: NOT DETECTED
HPIV4 RNA NPH QL NAA+NON-PROBE: NOT DETECTED
HYALINE CASTS #/AREA URNS LPF: ABNORMAL /LPF
IMM GRANULOCYTES # BLD AUTO: 0.09 K/UL (ref 0–0.11)
IMM GRANULOCYTES NFR BLD AUTO: 1.5 % (ref 0–0.9)
INR PPP: 1.79 (ref 0.87–1.13)
KETONES UR STRIP.AUTO-MCNC: NEGATIVE MG/DL
LACTATE SERPL-SCNC: 1.1 MMOL/L (ref 0.5–2)
LACTATE SERPL-SCNC: 1.2 MMOL/L (ref 0.5–2)
LACTATE SERPL-SCNC: 1.4 MMOL/L (ref 0.5–2)
LEUKOCYTE ESTERASE UR QL STRIP.AUTO: NEGATIVE
LYMPHOCYTES # BLD AUTO: 0.69 K/UL (ref 1–4.8)
LYMPHOCYTES NFR BLD: 11.1 % (ref 22–41)
M PNEUMO DNA NPH QL NAA+NON-PROBE: NOT DETECTED
MCH RBC QN AUTO: 27.8 PG (ref 27–33)
MCHC RBC AUTO-ENTMCNC: 31.5 G/DL (ref 33.6–35)
MCV RBC AUTO: 88.4 FL (ref 81.4–97.8)
METHADONE UR QL SCN: NEGATIVE
MICRO URNS: ABNORMAL
MONOCYTES # BLD AUTO: 0.45 K/UL (ref 0–0.85)
MONOCYTES NFR BLD AUTO: 7.3 % (ref 0–13.4)
NEUTROPHILS # BLD AUTO: 4.86 K/UL (ref 2–7.15)
NEUTROPHILS NFR BLD: 78.4 % (ref 44–72)
NITRITE UR QL STRIP.AUTO: NEGATIVE
NRBC # BLD AUTO: 0 K/UL
NRBC BLD-RTO: 0 /100 WBC
OPIATES UR QL SCN: NEGATIVE
OXYCODONE UR QL SCN: POSITIVE
PCP UR QL SCN: NEGATIVE
PH UR STRIP.AUTO: 7 [PH] (ref 5–8)
PLATELET # BLD AUTO: 63 K/UL (ref 164–446)
PMV BLD AUTO: 11.3 FL (ref 9–12.9)
POTASSIUM SERPL-SCNC: 3.9 MMOL/L (ref 3.6–5.5)
PROCALCITONIN SERPL-MCNC: 0.28 NG/ML
PROPOXYPH UR QL SCN: NEGATIVE
PROT UR QL STRIP: NEGATIVE MG/DL
PROTHROMBIN TIME: 20.3 SEC (ref 12–14.6)
RBC # BLD AUTO: 3.52 M/UL (ref 4.2–5.4)
RBC # URNS HPF: ABNORMAL /HPF
RBC UR QL AUTO: ABNORMAL
RSV RNA NPH QL NAA+NON-PROBE: NOT DETECTED
RV+EV RNA NPH QL NAA+NON-PROBE: NOT DETECTED
SARS-COV-2 RNA NPH QL NAA+NON-PROBE: NOTDETECTED
SCCMEC + MECA PNL NOSE NAA+PROBE: NEGATIVE
SCCMEC + MECA PNL NOSE NAA+PROBE: NEGATIVE
SODIUM SERPL-SCNC: 133 MMOL/L (ref 135–145)
SP GR UR STRIP.AUTO: 1
UROBILINOGEN UR STRIP.AUTO-MCNC: 0.2 MG/DL
WBC # BLD AUTO: 6.2 K/UL (ref 4.8–10.8)
WBC #/AREA URNS HPF: ABNORMAL /HPF

## 2023-04-23 PROCEDURE — 71260 CT THORAX DX C+: CPT

## 2023-04-23 PROCEDURE — 700102 HCHG RX REV CODE 250 W/ 637 OVERRIDE(OP): Performed by: INTERNAL MEDICINE

## 2023-04-23 PROCEDURE — 87633 RESP VIRUS 12-25 TARGETS: CPT

## 2023-04-23 PROCEDURE — 700102 HCHG RX REV CODE 250 W/ 637 OVERRIDE(OP)

## 2023-04-23 PROCEDURE — 85610 PROTHROMBIN TIME: CPT

## 2023-04-23 PROCEDURE — 80307 DRUG TEST PRSMV CHEM ANLYZR: CPT

## 2023-04-23 PROCEDURE — 770020 HCHG ROOM/CARE - TELE (206)

## 2023-04-23 PROCEDURE — A9270 NON-COVERED ITEM OR SERVICE: HCPCS | Performed by: INTERNAL MEDICINE

## 2023-04-23 PROCEDURE — 83605 ASSAY OF LACTIC ACID: CPT | Mod: 91

## 2023-04-23 PROCEDURE — A9270 NON-COVERED ITEM OR SERVICE: HCPCS

## 2023-04-23 PROCEDURE — 84145 PROCALCITONIN (PCT): CPT

## 2023-04-23 PROCEDURE — 700105 HCHG RX REV CODE 258: Performed by: STUDENT IN AN ORGANIZED HEALTH CARE EDUCATION/TRAINING PROGRAM

## 2023-04-23 PROCEDURE — 87640 STAPH A DNA AMP PROBE: CPT

## 2023-04-23 PROCEDURE — A9270 NON-COVERED ITEM OR SERVICE: HCPCS | Performed by: STUDENT IN AN ORGANIZED HEALTH CARE EDUCATION/TRAINING PROGRAM

## 2023-04-23 PROCEDURE — 87798 DETECT AGENT NOS DNA AMP: CPT

## 2023-04-23 PROCEDURE — 82140 ASSAY OF AMMONIA: CPT

## 2023-04-23 PROCEDURE — 99233 SBSQ HOSP IP/OBS HIGH 50: CPT | Performed by: STUDENT IN AN ORGANIZED HEALTH CARE EDUCATION/TRAINING PROGRAM

## 2023-04-23 PROCEDURE — 700117 HCHG RX CONTRAST REV CODE 255: Performed by: STUDENT IN AN ORGANIZED HEALTH CARE EDUCATION/TRAINING PROGRAM

## 2023-04-23 PROCEDURE — 700102 HCHG RX REV CODE 250 W/ 637 OVERRIDE(OP): Performed by: STUDENT IN AN ORGANIZED HEALTH CARE EDUCATION/TRAINING PROGRAM

## 2023-04-23 PROCEDURE — 80048 BASIC METABOLIC PNL TOTAL CA: CPT

## 2023-04-23 PROCEDURE — 87486 CHLMYD PNEUM DNA AMP PROBE: CPT

## 2023-04-23 PROCEDURE — 71045 X-RAY EXAM CHEST 1 VIEW: CPT

## 2023-04-23 PROCEDURE — 87581 M.PNEUMON DNA AMP PROBE: CPT

## 2023-04-23 PROCEDURE — 81001 URINALYSIS AUTO W/SCOPE: CPT

## 2023-04-23 PROCEDURE — 85025 COMPLETE CBC W/AUTO DIFF WBC: CPT

## 2023-04-23 PROCEDURE — 700111 HCHG RX REV CODE 636 W/ 250 OVERRIDE (IP): Performed by: STUDENT IN AN ORGANIZED HEALTH CARE EDUCATION/TRAINING PROGRAM

## 2023-04-23 PROCEDURE — 87641 MR-STAPH DNA AMP PROBE: CPT

## 2023-04-23 RX ORDER — LORAZEPAM 2 MG/ML
1 INJECTION INTRAMUSCULAR
Status: DISCONTINUED | OUTPATIENT
Start: 2023-04-23 | End: 2023-04-24

## 2023-04-23 RX ORDER — LORAZEPAM 2 MG/ML
0.5 INJECTION INTRAMUSCULAR EVERY 4 HOURS PRN
Status: DISCONTINUED | OUTPATIENT
Start: 2023-04-23 | End: 2023-04-24

## 2023-04-23 RX ORDER — LORAZEPAM 2 MG/1
4 TABLET ORAL
Status: DISCONTINUED | OUTPATIENT
Start: 2023-04-23 | End: 2023-04-24

## 2023-04-23 RX ORDER — LORAZEPAM 2 MG/ML
1.5 INJECTION INTRAMUSCULAR
Status: DISCONTINUED | OUTPATIENT
Start: 2023-04-23 | End: 2023-04-24

## 2023-04-23 RX ORDER — ACETAMINOPHEN 325 MG/1
650 TABLET ORAL ONCE
Status: COMPLETED | OUTPATIENT
Start: 2023-04-23 | End: 2023-04-23

## 2023-04-23 RX ORDER — SODIUM CHLORIDE, SODIUM LACTATE, POTASSIUM CHLORIDE, CALCIUM CHLORIDE 600; 310; 30; 20 MG/100ML; MG/100ML; MG/100ML; MG/100ML
INJECTION, SOLUTION INTRAVENOUS CONTINUOUS
Status: DISCONTINUED | OUTPATIENT
Start: 2023-04-23 | End: 2023-04-24

## 2023-04-23 RX ORDER — SODIUM CHLORIDE 9 MG/ML
30 INJECTION, SOLUTION INTRAVENOUS ONCE
Status: DISCONTINUED | OUTPATIENT
Start: 2023-04-23 | End: 2023-04-23

## 2023-04-23 RX ORDER — LORAZEPAM 2 MG/1
2 TABLET ORAL
Status: DISCONTINUED | OUTPATIENT
Start: 2023-04-23 | End: 2023-04-24

## 2023-04-23 RX ORDER — CALCIUM GLUCONATE 20 MG/ML
1 INJECTION, SOLUTION INTRAVENOUS ONCE
Status: COMPLETED | OUTPATIENT
Start: 2023-04-23 | End: 2023-04-23

## 2023-04-23 RX ORDER — LORAZEPAM 1 MG/1
1 TABLET ORAL EVERY 4 HOURS PRN
Status: DISCONTINUED | OUTPATIENT
Start: 2023-04-23 | End: 2023-04-24

## 2023-04-23 RX ORDER — LORAZEPAM 2 MG/ML
2 INJECTION INTRAMUSCULAR
Status: DISCONTINUED | OUTPATIENT
Start: 2023-04-23 | End: 2023-04-24

## 2023-04-23 RX ORDER — LORAZEPAM 0.5 MG/1
0.5 TABLET ORAL EVERY 4 HOURS PRN
Status: DISCONTINUED | OUTPATIENT
Start: 2023-04-23 | End: 2023-04-24

## 2023-04-23 RX ADMIN — LACTULOSE 30 ML: 20 SOLUTION ORAL at 05:01

## 2023-04-23 RX ADMIN — ACETAMINOPHEN 650 MG: 325 TABLET, FILM COATED ORAL at 03:14

## 2023-04-23 RX ADMIN — CALCIUM GLUCONATE 1 G: 20 INJECTION, SOLUTION INTRAVENOUS at 14:00

## 2023-04-23 RX ADMIN — OXYCODONE HYDROCHLORIDE 10 MG: 10 TABLET ORAL at 07:25

## 2023-04-23 RX ADMIN — OXYCODONE HYDROCHLORIDE 10 MG: 10 TABLET ORAL at 17:28

## 2023-04-23 RX ADMIN — LACTULOSE 30 ML: 20 SOLUTION ORAL at 17:28

## 2023-04-23 RX ADMIN — PIPERACILLIN AND TAZOBACTAM 4.5 G: 4; .5 INJECTION, POWDER, LYOPHILIZED, FOR SOLUTION INTRAVENOUS; PARENTERAL at 21:22

## 2023-04-23 RX ADMIN — PIPERACILLIN AND TAZOBACTAM 4.5 G: 4; .5 INJECTION, POWDER, LYOPHILIZED, FOR SOLUTION INTRAVENOUS; PARENTERAL at 14:01

## 2023-04-23 RX ADMIN — SPIRONOLACTONE 50 MG: 25 TABLET ORAL at 14:00

## 2023-04-23 RX ADMIN — ACETAMINOPHEN 650 MG: 325 TABLET, FILM COATED ORAL at 09:03

## 2023-04-23 RX ADMIN — SPIRONOLACTONE 50 MG: 25 TABLET ORAL at 03:15

## 2023-04-23 RX ADMIN — BENZONATATE 100 MG: 100 CAPSULE ORAL at 07:27

## 2023-04-23 RX ADMIN — ACETAMINOPHEN 650 MG: 325 TABLET, FILM COATED ORAL at 23:44

## 2023-04-23 RX ADMIN — LORAZEPAM 1 MG: 1 TABLET ORAL at 18:11

## 2023-04-23 RX ADMIN — GUAIFENESIN 600 MG: 600 TABLET, EXTENDED RELEASE ORAL at 05:00

## 2023-04-23 RX ADMIN — PIPERACILLIN AND TAZOBACTAM 4.5 G: 4; .5 INJECTION, POWDER, LYOPHILIZED, FOR SOLUTION INTRAVENOUS; PARENTERAL at 10:54

## 2023-04-23 RX ADMIN — OXYCODONE HYDROCHLORIDE 10 MG: 10 TABLET ORAL at 21:11

## 2023-04-23 RX ADMIN — OMEPRAZOLE 20 MG: 20 CAPSULE, DELAYED RELEASE ORAL at 05:01

## 2023-04-23 RX ADMIN — IOHEXOL 100 ML: 350 INJECTION, SOLUTION INTRAVENOUS at 17:16

## 2023-04-23 RX ADMIN — LORAZEPAM 3 MG: 1 TABLET ORAL at 22:55

## 2023-04-23 RX ADMIN — SODIUM CHLORIDE, POTASSIUM CHLORIDE, SODIUM LACTATE AND CALCIUM CHLORIDE: 600; 310; 30; 20 INJECTION, SOLUTION INTRAVENOUS at 10:10

## 2023-04-23 RX ADMIN — RIFAXIMIN 550 MG: 550 TABLET ORAL at 05:01

## 2023-04-23 RX ADMIN — CITALOPRAM HYDROBROMIDE 20 MG: 20 TABLET ORAL at 05:00

## 2023-04-23 ASSESSMENT — LIFESTYLE VARIABLES
TREMOR: *
ANXIETY: MODERATELY ANXIOUS OR GUARDED, SO ANXIETY IS INFERRED
ANXIETY: MODERATELY ANXIOUS OR GUARDED, SO ANXIETY IS INFERRED
AUDITORY DISTURBANCES: MILD HARSHNESS OR ABILITY TO FRIGHTEN
VISUAL DISTURBANCES: MODERATELY SEVERE HALLUCINATIONS
VISUAL DISTURBANCES: MODERATELY SEVERE HALLUCINATIONS
AUDITORY DISTURBANCES: MODERATE HARSHNESS OR ABILITY TO FRIGHTEN
TREMOR: TREMOR NOT VISIBLE BUT CAN BE FELT, FINGERTIP TO FINGERTIP
NAUSEA AND VOMITING: NO NAUSEA AND NO VOMITING
AGITATION: MODERATELY FIDGETY AND RESTLESS
ORIENTATION AND CLOUDING OF SENSORIUM: ORIENTED AND CAN DO SERIAL ADDITIONS
ORIENTATION AND CLOUDING OF SENSORIUM: ORIENTED AND CAN DO SERIAL ADDITIONS
TOTAL SCORE: 8
AUDITORY DISTURBANCES: NOT PRESENT
ORIENTATION AND CLOUDING OF SENSORIUM: ORIENTED AND CAN DO SERIAL ADDITIONS
TOTAL SCORE: 24
AGITATION: MODERATELY FIDGETY AND RESTLESS
PAROXYSMAL SWEATS: BARELY PERCEPTIBLE SWEATING. PALMS MOIST
ANXIETY: MODERATELY ANXIOUS OR GUARDED, SO ANXIETY IS INFERRED
NAUSEA AND VOMITING: NO NAUSEA AND NO VOMITING
TOTAL SCORE: 4
TREMOR: NO TREMOR
HEADACHE, FULLNESS IN HEAD: NOT PRESENT
TREMOR: *
ANXIETY: *
VISUAL DISTURBANCES: NOT PRESENT
ORIENTATION AND CLOUDING OF SENSORIUM: ORIENTED AND CAN DO SERIAL ADDITIONS
HEADACHE, FULLNESS IN HEAD: NOT PRESENT
HEADACHE, FULLNESS IN HEAD: NOT PRESENT
NAUSEA AND VOMITING: NO NAUSEA AND NO VOMITING
AGITATION: NORMAL ACTIVITY
PAROXYSMAL SWEATS: DRENCHING SWEATS
VISUAL DISTURBANCES: NOT PRESENT
NAUSEA AND VOMITING: NO NAUSEA AND NO VOMITING
HEADACHE, FULLNESS IN HEAD: NOT PRESENT
PAROXYSMAL SWEATS: NO SWEAT VISIBLE
TOTAL SCORE: 17
AGITATION: NORMAL ACTIVITY
AUDITORY DISTURBANCES: MODERATELY SEVERE HALLUCINATIONS
PAROXYSMAL SWEATS: NO SWEAT VISIBLE

## 2023-04-23 ASSESSMENT — FIBROSIS 4 INDEX: FIB4 SCORE: 4.28

## 2023-04-23 ASSESSMENT — PAIN DESCRIPTION - PAIN TYPE
TYPE: ACUTE PAIN

## 2023-04-23 NOTE — CARE PLAN
The patient is Stable - Low risk of patient condition declining or worsening    Shift Goals  Clinical Goals: safety, pain management, antibiotic treatment  Patient Goals: pain control and sleep  Family Goals: sofie    Progress made toward(s) clinical / shift goals:    Problem: Pain - Standard  Goal: Alleviation of pain or a reduction in pain to the patient’s comfort goal  Outcome: Progressing     Problem: Knowledge Deficit - Standard  Goal: Patient and family/care givers will demonstrate understanding of plan of care, disease process/condition, diagnostic tests and medications  Outcome: Progressing     Problem: Psychosocial  Goal: Patient's level of anxiety will decrease  Outcome: Progressing     Problem: Hemodynamics  Goal: Patient's hemodynamics, fluid balance and neurologic status will be stable or improve  Outcome: Progressing     Problem: Gastrointestinal Irritability  Goal: Nausea and vomiting will be absent or improve  Outcome: Progressing     Problem: Mobility  Goal: Patient's capacity to carry out activities will improve  Outcome: Progressing     Problem: Self Care  Goal: Patient will have the ability to perform ADLs independently or with assistance (bathe, groom, dress, toilet and feed)  Outcome: Progressing       Patient is not progressing towards the following goals:

## 2023-04-23 NOTE — PROGRESS NOTES
Hospital Medicine Daily Progress Note    Date of Service  4/23/2023    Chief Complaint  Tonie Tineo is a 37 y.o. female admitted 4/18/2023 with hemoptysis    Hospital Course  Started feeling about medical history of autoimmune hepatitis, history of bleeding presented with hemoptysis.  Evaluated today status post EGD with banding.  Patient with autoimmune hepatitis and she is on liver transplant list.    Interval Problem Update  4/22/2023  On 2 L oxygen saturating over 90  Remain febrile, likely from out of pneumonitis/community-acquired pneumonia  Labs reviewed  Repeat blood culture in process  Continue incentive spirometry  Continue wean of oxygen    Continue on Rocephin, azithromycin  Monitor blood culture    4/23/2023  Patient been persistently febrile  On 4 L saturating above 90  Remained tachycardic  Administrative for sepsis  Started on sepsis protocol    Lactic acid improved with bolus    Antibiotic changed to Zosyn  We will get CT chest abdomen pelvis to rule out other source of infection and given persistent fever      I have discussed this patient's plan of care and discharge plan at IDT rounds today with Case Management, Nursing, Nursing leadership, and other members of the IDT team.    Consultants/Specialty  GI    Code Status  Full Code    Disposition  Patient is not medically cleared for discharge.   Anticipate discharge to to home with close outpatient follow-up.  I have placed the appropriate orders for post-discharge needs.    Review of Systems  Review of Systems   Constitutional:  Positive for malaise/fatigue.      Physical Exam  Temp:  [37 °C (98.6 °F)-39.5 °C (103.1 °F)] 37.4 °C (99.3 °F)  Pulse:  [] 82  Resp:  [16-20] 18  BP: ()/(55-90) 107/60  SpO2:  [89 %-96 %] 96 %    Physical Exam  Constitutional:       Appearance: Normal appearance.   HENT:      Head:      Comments: On 2l NC      Fluids    Intake/Output Summary (Last 24 hours) at 4/23/2023 1303  Last data filed at 4/23/2023  1200  Gross per 24 hour   Intake 120 ml   Output 700 ml   Net -580 ml         Laboratory  Recent Labs     04/21/23  0255 04/22/23  0036 04/23/23  0149   WBC 2.9* 4.5* 6.2   RBC 3.28* 3.32* 3.52*   HEMOGLOBIN 9.2* 9.2* 9.8*   HEMATOCRIT 27.7* 29.4* 31.1*   MCV 84.5 88.6 88.4   MCH 28.0 27.7 27.8   MCHC 33.2* 31.3* 31.5*   RDW 57.2* 59.8* 61.6*   PLATELETCT 36* 51* 63*   MPV 9.7 9.7 11.3       Recent Labs     04/21/23  0255 04/22/23  0036 04/23/23  0149   SODIUM 137 135 133*   POTASSIUM 3.6 3.8 3.9   CHLORIDE 109 103 102   CO2 18* 19* 21   GLUCOSE 107* 110* 95   BUN 5* 3* <2*   CREATININE 0.44* 0.48* 0.44*   CALCIUM 7.2* 7.3* 7.9*                     Imaging  DX-CHEST-LIMITED (1 VIEW)   Final Result      Hazy right pulmonary opacities, concerning for a developing pneumonia.      DX-CHEST-LIMITED (1 VIEW)   Final Result      1.  Left base pneumonia.   2.  Right base atelectasis.      CT-CHEST,ABDOMEN,PELVIS WITH    (Results Pending)          Assessment/Plan  * Acute blood loss anemia- (present on admission)  Assessment & Plan  Patient presented with acute onset hematemesis, abdominal pain.  Abdomen is benign on exam.  Presumed upper GI bleed, likely variceal bleeding, given history of autoimmune hepatitis with variceal bleed 2 years ago requiring banding.  Hb drop from 11.4 to 9.4  GI consulted  S/p EGD with banding of esophageal varices  Hb stable    Sepsis (HCC)  Assessment & Plan  This is Sepsis Not present on admission  SIRS criteria identified on my evaluation include: Fever, with temperature greater than 101 deg F, Tachycardia, with heart rate greater than 90 BPM and Tachypnea, with respirations greater than 20 per minute  Source is pneumonia   Sepsis protocol initiated  Fluid resuscitation ordered per protocol  Crystalloid Fluid Administration: Fluid resuscitation ordered per standard protocol - 30 mL/kg per current or ideal body weight  IV antibiotics as appropriate for source of sepsis  Reassessment: I have  reassessed the patient's hemodynamic status    Patient with worsening pneumonia on chest x-ray  Persistently febrile  Plan is to get CT chest abdomen pelvis to rule out other source infection  On Zosyn  Follow blood culture          Acute respiratory failure with hypoxia (HCC)  Assessment & Plan  Secondary to left lower lobe pneumonia  SpO2 84% on room air on 04/19 at 1500  antibiotics    Community acquired pneumonia  Assessment & Plan  Left lower lobe pneumonia on imaging  WBC 2.2 --> 2.9  Productive cough  Rocephin, azithromycin    Alcohol abuse  Assessment & Plan  Thiamine supplementation  Monitor for withdrawal    Lactic acidosis  Assessment & Plan  ?  Secondary to liver disease, alcohol abuse and GI bleed  Continue IV hydration, thiamine, repeat lactic acid    Pancytopenia (HCC)  Assessment & Plan  ?  Secondary to chronic liver disease  Follow-up with PCP    Thrombocytopenia (HCC)  Assessment & Plan  In setting of liver disease  Plt 36    Hepatitis  Assessment & Plan  Reported history of autoimmune hepatitis  Mild bilirubin 1.9 and AST 55 elevation noted.  Patient may have alcoholic hepatitis superimposed on autoimmune hepatitis?  Advised against drinking alcohol  Referral to establish with GI placed         VTE prophylaxis: SCDs/TEDs    I have performed a physical exam and reviewed and updated ROS and Plan today (4/23/2023). In review of yesterday's note (4/22/2023), there are no changes except as documented above.

## 2023-04-23 NOTE — PROGRESS NOTES
CNA reported to this RN that patient had told her that she'd taken a iron pill from her home medications. RN came into patient's room and found a trash bag full of bottles of medications. There are total of 18 small bottles and 4 loose pills/tablets.  Medications are being taken down to pharmacy.  Patient reported that there was another batch of home medications already sent down to pharmacy.

## 2023-04-23 NOTE — ASSESSMENT & PLAN NOTE
This is Sepsis Not present on admission  SIRS criteria identified on my evaluation include: Fever, with temperature greater than 101 deg F, Tachycardia, with heart rate greater than 90 BPM and Tachypnea, with respirations greater than 20 per minute  Source is pneumonia   Sepsis protocol initiated  Fluid resuscitation ordered per protocol  Crystalloid Fluid Administration: Fluid resuscitation ordered per standard protocol - 30 mL/kg per current or ideal body weight  IV antibiotics as appropriate for source of sepsis  Reassessment: I have reassessed the patient's hemodynamic status    Patient with worsening pneumonia on chest x-ray  Persistently febrile  Plan is to get CT chest abdomen pelvis to rule out other source infection  On Zosyn  Follow blood culture

## 2023-04-24 ENCOUNTER — APPOINTMENT (OUTPATIENT)
Dept: RADIOLOGY | Facility: MEDICAL CENTER | Age: 38
DRG: 432 | End: 2023-04-24
Attending: NURSE PRACTITIONER
Payer: MEDICARE

## 2023-04-24 PROBLEM — E83.42 HYPOMAGNESEMIA: Status: ACTIVE | Noted: 2023-04-24

## 2023-04-24 PROBLEM — I85.00 ESOPHAGEAL VARICES (HCC): Status: ACTIVE | Noted: 2023-04-24

## 2023-04-24 PROBLEM — F10.231 ALCOHOL DEPENDENCE WITH WITHDRAWAL DELIRIUM (HCC): Status: ACTIVE | Noted: 2023-04-19

## 2023-04-24 PROBLEM — K76.6 PORTAL HYPERTENSIVE GASTROPATHY (HCC): Status: ACTIVE | Noted: 2023-04-24

## 2023-04-24 PROBLEM — R45.1 AGITATION REQUIRING SEDATION PROTOCOL: Status: ACTIVE | Noted: 2023-04-24

## 2023-04-24 PROBLEM — E83.39 HYPOPHOSPHATEMIA: Status: ACTIVE | Noted: 2023-04-24

## 2023-04-24 PROBLEM — K31.89 PORTAL HYPERTENSIVE GASTROPATHY (HCC): Status: ACTIVE | Noted: 2023-04-24

## 2023-04-24 PROBLEM — E87.8 ELECTROLYTE DISTURBANCE: Status: ACTIVE | Noted: 2023-04-24

## 2023-04-24 PROBLEM — E87.6 HYPOKALEMIA: Status: ACTIVE | Noted: 2023-04-24

## 2023-04-24 LAB
ALBUMIN SERPL BCP-MCNC: 3.5 G/DL (ref 3.2–4.9)
ALBUMIN/GLOB SERPL: 1.3 G/DL
ALP SERPL-CCNC: 88 U/L (ref 30–99)
ALT SERPL-CCNC: 17 U/L (ref 2–50)
AMPHET UR QL SCN: NEGATIVE
ANION GAP SERPL CALC-SCNC: 11 MMOL/L (ref 7–16)
AST SERPL-CCNC: 25 U/L (ref 12–45)
BARBITURATES UR QL SCN: NEGATIVE
BASOPHILS # BLD AUTO: 0.3 % (ref 0–1.8)
BASOPHILS # BLD: 0.02 K/UL (ref 0–0.12)
BENZODIAZ UR QL SCN: NEGATIVE
BILIRUB SERPL-MCNC: 1.5 MG/DL (ref 0.1–1.5)
BUN SERPL-MCNC: 2 MG/DL (ref 8–22)
BZE UR QL SCN: NEGATIVE
CALCIUM ALBUM COR SERPL-MCNC: 8.9 MG/DL (ref 8.5–10.5)
CALCIUM SERPL-MCNC: 8.5 MG/DL (ref 8.5–10.5)
CANNABINOIDS UR QL SCN: NEGATIVE
CHLORIDE SERPL-SCNC: 103 MMOL/L (ref 96–112)
CO2 SERPL-SCNC: 20 MMOL/L (ref 20–33)
CREAT SERPL-MCNC: 0.39 MG/DL (ref 0.5–1.4)
EOSINOPHIL # BLD AUTO: 0.03 K/UL (ref 0–0.51)
EOSINOPHIL NFR BLD: 0.4 % (ref 0–6.9)
ERYTHROCYTE [DISTWIDTH] IN BLOOD BY AUTOMATED COUNT: 60.5 FL (ref 35.9–50)
GFR SERPLBLD CREATININE-BSD FMLA CKD-EPI: 130 ML/MIN/1.73 M 2
GLOBULIN SER CALC-MCNC: 2.7 G/DL (ref 1.9–3.5)
GLUCOSE SERPL-MCNC: 124 MG/DL (ref 65–99)
HCT VFR BLD AUTO: 31.4 % (ref 37–47)
HGB BLD-MCNC: 10.1 G/DL (ref 12–16)
IMM GRANULOCYTES # BLD AUTO: 0.05 K/UL (ref 0–0.11)
IMM GRANULOCYTES NFR BLD AUTO: 0.7 % (ref 0–0.9)
LIPASE SERPL-CCNC: 19 U/L (ref 11–82)
LYMPHOCYTES # BLD AUTO: 0.57 K/UL (ref 1–4.8)
LYMPHOCYTES NFR BLD: 7.9 % (ref 22–41)
MAGNESIUM SERPL-MCNC: 1.5 MG/DL (ref 1.5–2.5)
MCH RBC QN AUTO: 27.9 PG (ref 27–33)
MCHC RBC AUTO-ENTMCNC: 32.2 G/DL (ref 33.6–35)
MCV RBC AUTO: 86.7 FL (ref 81.4–97.8)
METHADONE UR QL SCN: NEGATIVE
MONOCYTES # BLD AUTO: 0.69 K/UL (ref 0–0.85)
MONOCYTES NFR BLD AUTO: 9.6 % (ref 0–13.4)
NEUTROPHILS # BLD AUTO: 5.81 K/UL (ref 2–7.15)
NEUTROPHILS NFR BLD: 81.1 % (ref 44–72)
NRBC # BLD AUTO: 0 K/UL
NRBC BLD-RTO: 0 /100 WBC
OPIATES UR QL SCN: NEGATIVE
OXYCODONE UR QL SCN: POSITIVE
PCP UR QL SCN: NEGATIVE
PHOSPHATE SERPL-MCNC: 1.6 MG/DL (ref 2.5–4.5)
PLATELET # BLD AUTO: 68 K/UL (ref 164–446)
PMV BLD AUTO: 10.8 FL (ref 9–12.9)
POTASSIUM SERPL-SCNC: 3.6 MMOL/L (ref 3.6–5.5)
PROCALCITONIN SERPL-MCNC: 0.4 NG/ML
PROPOXYPH UR QL SCN: NEGATIVE
PROT SERPL-MCNC: 6.2 G/DL (ref 6–8.2)
RBC # BLD AUTO: 3.62 M/UL (ref 4.2–5.4)
SODIUM SERPL-SCNC: 134 MMOL/L (ref 135–145)
WBC # BLD AUTO: 7.2 K/UL (ref 4.8–10.8)

## 2023-04-24 PROCEDURE — 700105 HCHG RX REV CODE 258: Performed by: INTERNAL MEDICINE

## 2023-04-24 PROCEDURE — 770022 HCHG ROOM/CARE - ICU (200)

## 2023-04-24 PROCEDURE — 80307 DRUG TEST PRSMV CHEM ANLYZR: CPT

## 2023-04-24 PROCEDURE — 700105 HCHG RX REV CODE 258: Performed by: STUDENT IN AN ORGANIZED HEALTH CARE EDUCATION/TRAINING PROGRAM

## 2023-04-24 PROCEDURE — 700105 HCHG RX REV CODE 258: Performed by: NURSE PRACTITIONER

## 2023-04-24 PROCEDURE — 85025 COMPLETE CBC W/AUTO DIFF WBC: CPT

## 2023-04-24 PROCEDURE — 84100 ASSAY OF PHOSPHORUS: CPT

## 2023-04-24 PROCEDURE — 700101 HCHG RX REV CODE 250: Performed by: INTERNAL MEDICINE

## 2023-04-24 PROCEDURE — 84145 PROCALCITONIN (PCT): CPT

## 2023-04-24 PROCEDURE — 700111 HCHG RX REV CODE 636 W/ 250 OVERRIDE (IP): Performed by: NURSE PRACTITIONER

## 2023-04-24 PROCEDURE — 99291 CRITICAL CARE FIRST HOUR: CPT | Performed by: INTERNAL MEDICINE

## 2023-04-24 PROCEDURE — 83735 ASSAY OF MAGNESIUM: CPT

## 2023-04-24 PROCEDURE — 700101 HCHG RX REV CODE 250: Performed by: NURSE PRACTITIONER

## 2023-04-24 PROCEDURE — 83690 ASSAY OF LIPASE: CPT

## 2023-04-24 PROCEDURE — 700111 HCHG RX REV CODE 636 W/ 250 OVERRIDE (IP)

## 2023-04-24 PROCEDURE — 700105 HCHG RX REV CODE 258

## 2023-04-24 PROCEDURE — 700102 HCHG RX REV CODE 250 W/ 637 OVERRIDE(OP): Performed by: NURSE PRACTITIONER

## 2023-04-24 PROCEDURE — A9270 NON-COVERED ITEM OR SERVICE: HCPCS | Performed by: NURSE PRACTITIONER

## 2023-04-24 PROCEDURE — 80053 COMPREHEN METABOLIC PANEL: CPT

## 2023-04-24 PROCEDURE — 99292 CRITICAL CARE ADDL 30 MIN: CPT | Performed by: NURSE PRACTITIONER

## 2023-04-24 PROCEDURE — 700111 HCHG RX REV CODE 636 W/ 250 OVERRIDE (IP): Performed by: INTERNAL MEDICINE

## 2023-04-24 PROCEDURE — 302136 NUTRITION PUMP: Performed by: INTERNAL MEDICINE

## 2023-04-24 PROCEDURE — 99292 CRITICAL CARE ADDL 30 MIN: CPT | Performed by: INTERNAL MEDICINE

## 2023-04-24 PROCEDURE — 700111 HCHG RX REV CODE 636 W/ 250 OVERRIDE (IP): Performed by: STUDENT IN AN ORGANIZED HEALTH CARE EDUCATION/TRAINING PROGRAM

## 2023-04-24 RX ORDER — DEXMEDETOMIDINE HYDROCHLORIDE 4 UG/ML
.1-1.5 INJECTION, SOLUTION INTRAVENOUS CONTINUOUS
Status: DISCONTINUED | OUTPATIENT
Start: 2023-04-24 | End: 2023-04-25

## 2023-04-24 RX ORDER — CHLORDIAZEPOXIDE HYDROCHLORIDE 25 MG/1
25 CAPSULE, GELATIN COATED ORAL EVERY 8 HOURS
Status: COMPLETED | OUTPATIENT
Start: 2023-04-25 | End: 2023-04-25

## 2023-04-24 RX ORDER — ONDANSETRON 4 MG/1
4 TABLET, ORALLY DISINTEGRATING ORAL EVERY 4 HOURS PRN
Status: DISCONTINUED | OUTPATIENT
Start: 2023-04-24 | End: 2023-04-26

## 2023-04-24 RX ORDER — MAGNESIUM SULFATE HEPTAHYDRATE 40 MG/ML
4 INJECTION, SOLUTION INTRAVENOUS ONCE
Status: COMPLETED | OUTPATIENT
Start: 2023-04-24 | End: 2023-04-24

## 2023-04-24 RX ORDER — LACTULOSE 20 G/30ML
30 SOLUTION ORAL 3 TIMES DAILY
Status: DISCONTINUED | OUTPATIENT
Start: 2023-04-24 | End: 2023-04-25

## 2023-04-24 RX ORDER — SPIRONOLACTONE 25 MG/1
50 TABLET ORAL 2 TIMES DAILY
Status: DISCONTINUED | OUTPATIENT
Start: 2023-04-24 | End: 2023-04-25

## 2023-04-24 RX ORDER — OXYCODONE HYDROCHLORIDE 5 MG/1
5 TABLET ORAL
Status: DISCONTINUED | OUTPATIENT
Start: 2023-04-24 | End: 2023-04-25

## 2023-04-24 RX ORDER — LORAZEPAM 2 MG/ML
2 INJECTION INTRAMUSCULAR EVERY 4 HOURS
Status: DISCONTINUED | OUTPATIENT
Start: 2023-04-24 | End: 2023-04-24

## 2023-04-24 RX ORDER — IBUPROFEN 600 MG/1
600 TABLET ORAL ONCE
Status: DISCONTINUED | OUTPATIENT
Start: 2023-04-24 | End: 2023-04-24

## 2023-04-24 RX ORDER — LORAZEPAM 2 MG/ML
1-2 INJECTION INTRAMUSCULAR
Status: DISCONTINUED | OUTPATIENT
Start: 2023-04-24 | End: 2023-04-26

## 2023-04-24 RX ORDER — MIDAZOLAM HYDROCHLORIDE 1 MG/ML
2 INJECTION INTRAMUSCULAR; INTRAVENOUS ONCE
Status: DISCONTINUED | OUTPATIENT
Start: 2023-04-24 | End: 2023-04-25

## 2023-04-24 RX ORDER — HALOPERIDOL 5 MG/ML
5 INJECTION INTRAMUSCULAR ONCE
Status: COMPLETED | OUTPATIENT
Start: 2023-04-24 | End: 2023-04-24

## 2023-04-24 RX ORDER — CHLORDIAZEPOXIDE HYDROCHLORIDE 25 MG/1
25 CAPSULE, GELATIN COATED ORAL DAILY
Status: DISCONTINUED | OUTPATIENT
Start: 2023-04-27 | End: 2023-04-26

## 2023-04-24 RX ORDER — GABAPENTIN 100 MG/1
200 CAPSULE ORAL 3 TIMES DAILY
Status: DISCONTINUED | OUTPATIENT
Start: 2023-04-24 | End: 2023-04-24

## 2023-04-24 RX ORDER — ACETAMINOPHEN 325 MG/1
650 TABLET ORAL EVERY 6 HOURS PRN
Status: DISCONTINUED | OUTPATIENT
Start: 2023-04-24 | End: 2023-04-26

## 2023-04-24 RX ORDER — MIDAZOLAM HYDROCHLORIDE 1 MG/ML
1-5 INJECTION INTRAMUSCULAR; INTRAVENOUS
Status: COMPLETED | OUTPATIENT
Start: 2023-04-24 | End: 2023-04-24

## 2023-04-24 RX ORDER — OXYCODONE HYDROCHLORIDE 10 MG/1
10 TABLET ORAL
Status: DISCONTINUED | OUTPATIENT
Start: 2023-04-24 | End: 2023-04-25

## 2023-04-24 RX ORDER — GABAPENTIN 100 MG/1
200 CAPSULE ORAL EVERY 8 HOURS
Status: DISCONTINUED | OUTPATIENT
Start: 2023-04-24 | End: 2023-04-26

## 2023-04-24 RX ORDER — CHLORDIAZEPOXIDE HYDROCHLORIDE 25 MG/1
50 CAPSULE, GELATIN COATED ORAL EVERY 8 HOURS
Status: COMPLETED | OUTPATIENT
Start: 2023-04-24 | End: 2023-04-24

## 2023-04-24 RX ORDER — SODIUM CHLORIDE, SODIUM LACTATE, POTASSIUM CHLORIDE, CALCIUM CHLORIDE 600; 310; 30; 20 MG/100ML; MG/100ML; MG/100ML; MG/100ML
INJECTION, SOLUTION INTRAVENOUS CONTINUOUS
Status: ACTIVE | OUTPATIENT
Start: 2023-04-24 | End: 2023-04-25

## 2023-04-24 RX ORDER — CHLORDIAZEPOXIDE HYDROCHLORIDE 25 MG/1
25 CAPSULE, GELATIN COATED ORAL 2 TIMES DAILY
Status: DISCONTINUED | OUTPATIENT
Start: 2023-04-26 | End: 2023-04-26

## 2023-04-24 RX ORDER — CITALOPRAM 20 MG/1
20 TABLET ORAL DAILY
Status: DISCONTINUED | OUTPATIENT
Start: 2023-04-25 | End: 2023-04-26

## 2023-04-24 RX ORDER — PROMETHAZINE HYDROCHLORIDE 25 MG/1
12.5-25 TABLET ORAL EVERY 4 HOURS PRN
Status: DISCONTINUED | OUTPATIENT
Start: 2023-04-24 | End: 2023-04-26

## 2023-04-24 RX ADMIN — LORAZEPAM 2 MG: 2 INJECTION INTRAMUSCULAR; INTRAVENOUS at 01:25

## 2023-04-24 RX ADMIN — GABAPENTIN 200 MG: 100 CAPSULE ORAL at 10:02

## 2023-04-24 RX ADMIN — CHLORDIAZEPOXIDE HYDROCHLORIDE 50 MG: 25 CAPSULE ORAL at 22:01

## 2023-04-24 RX ADMIN — LORAZEPAM 2 MG: 2 INJECTION INTRAMUSCULAR; INTRAVENOUS at 05:16

## 2023-04-24 RX ADMIN — DEXMEDETOMIDINE 1.5 MCG/KG/HR: 200 INJECTION, SOLUTION INTRAVENOUS at 10:15

## 2023-04-24 RX ADMIN — LORAZEPAM 2 MG: 2 INJECTION INTRAMUSCULAR; INTRAVENOUS at 10:21

## 2023-04-24 RX ADMIN — LORAZEPAM 2 MG: 2 INJECTION INTRAMUSCULAR; INTRAVENOUS at 02:13

## 2023-04-24 RX ADMIN — SPIRONOLACTONE 50 MG: 25 TABLET ORAL at 17:19

## 2023-04-24 RX ADMIN — DEXMEDETOMIDINE 1.3 MCG/KG/HR: 200 INJECTION, SOLUTION INTRAVENOUS at 19:14

## 2023-04-24 RX ADMIN — PIPERACILLIN AND TAZOBACTAM 4.5 G: 4; .5 INJECTION, POWDER, LYOPHILIZED, FOR SOLUTION INTRAVENOUS; PARENTERAL at 05:58

## 2023-04-24 RX ADMIN — THIAMINE HYDROCHLORIDE 500 MG: 100 INJECTION, SOLUTION INTRAMUSCULAR; INTRAVENOUS at 12:30

## 2023-04-24 RX ADMIN — HALOPERIDOL LACTATE 5 MG: 5 INJECTION, SOLUTION INTRAMUSCULAR at 01:01

## 2023-04-24 RX ADMIN — LORAZEPAM 1.5 MG: 2 INJECTION INTRAMUSCULAR; INTRAVENOUS at 00:05

## 2023-04-24 RX ADMIN — DEXMEDETOMIDINE 0.2 MCG/KG/HR: 200 INJECTION, SOLUTION INTRAVENOUS at 05:13

## 2023-04-24 RX ADMIN — LORAZEPAM 2 MG: 2 INJECTION INTRAMUSCULAR; INTRAVENOUS at 03:43

## 2023-04-24 RX ADMIN — LORAZEPAM 2 MG: 2 INJECTION INTRAMUSCULAR; INTRAVENOUS at 02:39

## 2023-04-24 RX ADMIN — GABAPENTIN 200 MG: 100 CAPSULE ORAL at 17:19

## 2023-04-24 RX ADMIN — THIAMINE HYDROCHLORIDE 500 MG: 100 INJECTION, SOLUTION INTRAMUSCULAR; INTRAVENOUS at 04:10

## 2023-04-24 RX ADMIN — MIDAZOLAM HYDROCHLORIDE 5 MG: 1 INJECTION, SOLUTION INTRAMUSCULAR; INTRAVENOUS at 08:25

## 2023-04-24 RX ADMIN — DEXMEDETOMIDINE 1.5 MCG/KG/HR: 200 INJECTION, SOLUTION INTRAVENOUS at 14:45

## 2023-04-24 RX ADMIN — LORAZEPAM 2 MG: 2 INJECTION INTRAMUSCULAR; INTRAVENOUS at 01:46

## 2023-04-24 RX ADMIN — LORAZEPAM 2 MG: 2 INJECTION INTRAMUSCULAR; INTRAVENOUS at 03:27

## 2023-04-24 RX ADMIN — SODIUM CHLORIDE, POTASSIUM CHLORIDE, SODIUM LACTATE AND CALCIUM CHLORIDE: 600; 310; 30; 20 INJECTION, SOLUTION INTRAVENOUS at 22:07

## 2023-04-24 RX ADMIN — LORAZEPAM 2 MG: 2 INJECTION INTRAMUSCULAR; INTRAVENOUS at 07:11

## 2023-04-24 RX ADMIN — CHLORDIAZEPOXIDE HYDROCHLORIDE 50 MG: 25 CAPSULE ORAL at 09:59

## 2023-04-24 RX ADMIN — LACTULOSE 30 ML: 10 SOLUTION ORAL at 17:20

## 2023-04-24 RX ADMIN — MAGNESIUM SULFATE HEPTAHYDRATE 4 G: 40 INJECTION, SOLUTION INTRAVENOUS at 05:51

## 2023-04-24 RX ADMIN — CEFTRIAXONE SODIUM 2000 MG: 10 INJECTION, POWDER, FOR SOLUTION INTRAVENOUS at 12:20

## 2023-04-24 RX ADMIN — POTASSIUM PHOSPHATE, MONOBASIC AND POTASSIUM PHOSPHATE, DIBASIC 30 MMOL: 224; 236 INJECTION, SOLUTION, CONCENTRATE INTRAVENOUS at 10:38

## 2023-04-24 RX ADMIN — SODIUM CHLORIDE, POTASSIUM CHLORIDE, SODIUM LACTATE AND CALCIUM CHLORIDE: 600; 310; 30; 20 INJECTION, SOLUTION INTRAVENOUS at 05:58

## 2023-04-24 RX ADMIN — CHLORDIAZEPOXIDE HYDROCHLORIDE 50 MG: 25 CAPSULE ORAL at 14:59

## 2023-04-24 RX ADMIN — LACTULOSE 30 ML: 10 SOLUTION ORAL at 12:17

## 2023-04-24 RX ADMIN — OMEPRAZOLE 40 MG: KIT at 10:15

## 2023-04-24 RX ADMIN — THIAMINE HYDROCHLORIDE 500 MG: 100 INJECTION, SOLUTION INTRAMUSCULAR; INTRAVENOUS at 17:24

## 2023-04-24 ASSESSMENT — LIFESTYLE VARIABLES
AUDITORY DISTURBANCES: CONTINUOUS HALLUCINATIONS
NAUSEA AND VOMITING: NO NAUSEA AND NO VOMITING
AGITATION: PACES BACK AND FORTH DURING MOST OF THE INTERVIEW OR CONSTANTLY THRASHES ABOUT.
VISUAL DISTURBANCES: SEVERE HALLUCINATIONS
TOTAL SCORE: 34
HEADACHE, FULLNESS IN HEAD: NOT PRESENT
PAROXYSMAL SWEATS: BARELY PERCEPTIBLE SWEATING. PALMS MOIST
HEADACHE, FULLNESS IN HEAD: NOT PRESENT
AGITATION: *
ANXIETY: *
PAROXYSMAL SWEATS: BARELY PERCEPTIBLE SWEATING. PALMS MOIST
AUDITORY DISTURBANCES: CONTINUOUS HALLUCINATIONS
AUDITORY DISTURBANCES: MODERATELY SEVERE HALLUCINATIONS
TREMOR: *
NAUSEA AND VOMITING: NO NAUSEA AND NO VOMITING
PAROXYSMAL SWEATS: *
NAUSEA AND VOMITING: NO NAUSEA AND NO VOMITING
NAUSEA AND VOMITING: NO NAUSEA AND NO VOMITING
ORIENTATION AND CLOUDING OF SENSORIUM: DISORIENTED FOR PLACE AND / OR PERSON
TREMOR: *
VISUAL DISTURBANCES: CONTINUOUS HALLUCINATIONS
HEADACHE, FULLNESS IN HEAD: NOT PRESENT
TOTAL SCORE: 26
AGITATION: *
ANXIETY: *
HEADACHE, FULLNESS IN HEAD: NOT PRESENT
ORIENTATION AND CLOUDING OF SENSORIUM: DATE DISORIENTATION BY MORE THAN TWO CALENDAR DAYS
NAUSEA AND VOMITING: NO NAUSEA AND NO VOMITING
AUDITORY DISTURBANCES: SEVERE HALLUCINATIONS
VISUAL DISTURBANCES: CONTINUOUS HALLUCINATIONS
HEADACHE, FULLNESS IN HEAD: NOT PRESENT
TREMOR: *
HEADACHE, FULLNESS IN HEAD: NOT PRESENT
TOTAL SCORE: 29
TOTAL SCORE: 34
PAROXYSMAL SWEATS: NO SWEAT VISIBLE
ORIENTATION AND CLOUDING OF SENSORIUM: DISORIENTED FOR PLACE AND / OR PERSON
TOTAL SCORE: 28
VISUAL DISTURBANCES: SEVERE HALLUCINATIONS
ANXIETY: *
VISUAL DISTURBANCES: SEVERE HALLUCINATIONS
AUDITORY DISTURBANCES: SEVERE HALLUCINATIONS
VISUAL DISTURBANCES: CONTINUOUS HALLUCINATIONS
TOTAL SCORE: 34
AGITATION: *
NAUSEA AND VOMITING: NO NAUSEA AND NO VOMITING
PAROXYSMAL SWEATS: *
PAROXYSMAL SWEATS: *
ORIENTATION AND CLOUDING OF SENSORIUM: DATE DISORIENTATION BY MORE THAN TWO CALENDAR DAYS
ANXIETY: *
TREMOR: TREMOR NOT VISIBLE BUT CAN BE FELT, FINGERTIP TO FINGERTIP
AUDITORY DISTURBANCES: CONTINUOUS HALLUCINATIONS
ANXIETY: *
ANXIETY: *
TREMOR: *
TREMOR: *

## 2023-04-24 ASSESSMENT — PAIN DESCRIPTION - PAIN TYPE
TYPE: ACUTE PAIN

## 2023-04-24 ASSESSMENT — FIBROSIS 4 INDEX: FIB4 SCORE: 3.39

## 2023-04-24 NOTE — PROGRESS NOTES
Tonie Tineo is a 37 year old female with PMH significant for ETOH, autoimmune Hepatitis, and variceal bleeding s/p banding 2 years ago in [place] who transferred here from [facility] in [place] with hematemesis. She presented to outside facility via law enforcement with vomiting bright red blood and 7/10 abdominal pain. Per report, last BM AM 4/23 without evidence of melena or blood. Patient had been binge drinking all day.   She did not have any vomiting in the ER of [facility]. Vitals showed 's, with stable BP. Hemoglobin 11.4, 11.1.  Platelet count 44.  Lactic acid 3.8. ETOH level 20. INR 1.6. She was admitted to the Hospitalist service.      4/19 - GI consult. MELD 12. Platelets 29, transfused. EGD with 2 bands placed.   4/20 - ABX for LLL pneumonia.  4/21 - 4/23 - fevers, hypoxia. CT chest/abdomen/pelvis with contrast showed diffuse bilateral groundglass infiltrates MI: Acute on chronic pancreatitis; thickening of duodenal wall. (-) COVID.     AM of 4/24 she developed signs of acute alcohol withdrawal requiring about 16 mg Ativan and was transferred to ICU for Precedex infusion and close hemodynamic monitoring.     Seen and examined this morning. Patient is very restless, kicking legs in the air, mumbling incomprehensible words; not combative. Titrating Precedex drip, added Librium taper and Gabapentin.     Required dose of Versed for Leonard and IRIS placement.     Patient remains critically ill. Additional Critical care time = 40 minutes in directly providing and coordinating critical care and extensive data review.  No time overlap and excludes procedures.    RAJANI Quiroz.

## 2023-04-24 NOTE — PROGRESS NOTES
This RN off the floor at 0900 with other pt for procedure. Report given to covering RNs, re-assumed care of pt at 1300.

## 2023-04-24 NOTE — PROGRESS NOTES
YE Ren came to bedside to assess patient. Patient received 12 mg of IV Ativan over 2 hours. The intensivist came to bedside to assess patient and decided to transfer her to ICU.  Latest vitals, patient is tachycardic in the 110s.

## 2023-04-24 NOTE — ASSESSMENT & PLAN NOTE
-due to CAP, sedation for ETOH withdrawal  -titrate FIO2 to keep sats > 92%  -encourage mobility and incentive spirometry

## 2023-04-24 NOTE — PROGRESS NOTES
Patient is withdrawing very hard. She's seeing things and people that are not there and talking to them. She is still able to answer orientation questions correctly, but confused and agitated. CIWA protocol followed.   She also has a fever of 101.3F which she's given tylenol for. She pulled out the IV antibiotics and the NS tubings.

## 2023-04-24 NOTE — DIETARY
"Nutrition Support Assessment   Day 5 of admit.  Tonie Tineo is a 37 y.o. female with admitting DX of GI bleed.      Current problem list:  Electrolyte disturbance   Hypophosphatemia   Hypomagnesemia  Portal hypertensive gastropathy  Esophageal varices   Agitation requiring sedation protocol   Sepsis   Community acquired pneumonia   Acute respiratory failure with hypoxia   Hepatitis   Pancytopenia   Alcohol dependence with w/d delirium     Assessment:  Estimated Nutritional Needs: based on:   Height: 172.7 cm (5' 8\")  Weight: 73.9 kg (162 lb 14.7 oz) via bed scale   Weight to Use in Calculations: 73.9 kg (162 lb 14.7 oz)  Body mass index is 24.77 kg/m²., BMI classification: Normal     Calculation/Equation:   REE per JRT=8165 kcals/day (x1.5=2119 kcals/day)  Total Calories / day: 1478 - 1848 (Calories / k - 25)  Total Grams Protein / day: 74 - 89 (Grams Protein / k.0 - 1.2)     Evaluation:   Pt previously on Low fiber diet with adequate intake (mostly >50%) over the last several days. Pt with worsening withdrawal, now sedated in ICU. Nutrition support indicated to meet estimated needs.   Order for NGT though not yet placed   Current clinical picture and MD progress notes reviewed. Pt admitted with acute upper gastrointestinal hemorrhage. EGD  showed esophageal varices s/p banding. Pt with increased symptoms of withdrawal, transferred to ICU care today .  Given pts hx of EtOH pt is at risk for refeeding. Recommend monitoring electrolytes and replete as needed.   Labs () Na 134 (L), Glu 124 (H), Phos 1.6 (L)  Meds: precedex @ 1.5 mcg/kg/hr, Kphos, thiamine, gabapentin   Skin: no wounds or edema noted   +BM      Malnutrition Risk: N/A     Recommendations/Plan:  Once tube placed and placement is confirmed initiate Fibersource HN @ 25 mL/hr and advance per protocol to goal rate of 60 mL/hr providing 1728 kcals, 78 grams protein and 1166 mL free water.   Fluids per MD   Monitor for refeeding: " Order BMP w/ Mg and Phos x 5 days. Replete K, Phos and Mg prn. Supplement 100 mg Thiamine x 7 days to reduce risk of refeeding  Monitor weights   Diet advancements as medically feasible per MD and SLP    RD following

## 2023-04-24 NOTE — HOSPITAL COURSE
Tonie Tineo is a 37 year old female with PMH significant for chronic pain on chronic OP opioid therapy, ETOH, autoimmune Hepatitis, and variceal bleeding s/p banding 2 years ago in [place] who transferred here from [facility] in [place] with hematemesis. She presented to outside facility via law enforcement with vomiting bright red blood and 7/10 abdominal pain. Per report, last BM AM 4/23 without evidence of melena or blood. Patient had been binge drinking all day.   She did not have any vomiting in the ER of [facility]. Vitals showed 's, with stable BP. Hemoglobin 11.4, 11.1.  Platelet count 44.  Lactic acid 3.8. ETOH level 20. INR 1.6.  She was admitted to the Hospitalist service     4/19 - GI consult. MELD 12. Platelets 29, transfused. EGD with 2 bands placed.   4/20 - ABX for LLL pneumonia.  4/21 - 4/23 - fevers, hypoxia. CT chest/abdomen/pelvis with contrast showed diffuse bilateral groundglass infiltrates MI: Acute on chronic pancreatitis; thickening of duodenal wall. (-) COVID.    AM of 4/24 she developed signs of acute alcohol withdrawal requiring about 16 mg Ativan and was transferred to ICU for Precedex infusion and close hemodynamic monitoring.   4/25 - alert/oriented. Narcotic withdrawal. SBP 80-90's, improved with IVF. Off Precedex.

## 2023-04-24 NOTE — CONSULTS
CRITICAL CARE MEDICINE ATTENDING CONSULTATION    Date of admission  4/18/2023    Chief Complaint  37 y.o. female admitted 4/18/2023 with an acute upper gastrointestinal hemorrhage.    History of Present Illness      The entire history is obtained from healthcare providers and the medical record as this lady cannot provide me with any meaningful history.  I was kindly asked by YE Gentile to see and evaluate this lady for evaluation and management of increasing agitated delirium despite high-dose benzodiazepine administration.  Bless her heart, this lady has a history of alcohol abuse, alcoholic liver disease, autoimmune hepatitis and esophageal varices requiring banding approximately 2 years ago.  She presented to [facility] on or about 4/18/2013 with hematemesis.  On or about 4/19, she underwent EGD which revealed esophageal varices with red tamica spots and portal hypertensive gastropathy.  Two bands were placed on her distal varices with subsequent flattening of her varices.  She is being treated with omeprazole.  She has not required any transfusion of PRBCs.  On 4/22 at 0513 hrs., she became febrile to 38.7 degrees.  On 4/23 at 0305 hrs., she became febrile to 39.5 degrees.  She has received ceftriaxone from presentation up until 4/22 and completed 3 days of azithromycin on 4/22.  On 4/23, her ceftriaxone was escalated to Zosyn.  CT imaging on 4/23 reveals bilateral patchy opacities in both lungs, particularly the left upper lobe, right upper lobe and right lower lobe.  Additionally, she has mediastinal and hilar adenopathy.  Imaging further revealed inflammatory stranding and fluid adjacent to her pancreas.  Her lipase has been normal.  She has been on the CIWA protocol and began experiencing an escalation in her scores from 8 early yesterday evening to 34 in the wee hours of this morning.  She has received a total of 16.5 mg of lorazepam in the last 5 hours.  Despite this, she is now in restraints,  agitated, diaphoretic and hallucinating.  Nursing informs me that last evening, they discovered a bag of medications in her possession and these were appropriately confiscated.  Thus, the specter of surreptitious self administration of medications is raised.      Review of Systems  Review of Systems   Unable to perform ROS: Acuity of condition     Vital Signs for the last 24 hours  Temp:  [37.2 °C (99 °F)-38.7 °C (101.7 °F)] 38.3 °C (100.9 °F)  Pulse:  [] 123  Resp:  [16-22] 22  BP: (105-118)/(49-68) 109/62  SpO2:  [91 %-96 %] 95 %    Hemodynamic parameters for the last 24 hours       Vent Settings for the last 24 hours       Physical Exam  Physical Exam  Constitutional:       Appearance: She is diaphoretic.   HENT:      Head: Normocephalic.      Mouth/Throat:      Mouth: Mucous membranes are dry.   Eyes:      Pupils: Pupils are equal, round, and reactive to light.   Cardiovascular:      Comments: Tachycardia  Pulmonary:      Breath sounds: No wheezing or rales.   Abdominal:      General: There is no distension.      Tenderness: There is no abdominal tenderness.   Musculoskeletal:      Cervical back: Normal range of motion.      Right lower leg: No edema.      Left lower leg: No edema.   Skin:     General: Skin is warm.      Capillary Refill: Capillary refill takes less than 2 seconds.   Neurological:      Comments: She is awake and alert.  She requires soft wrist restraints to prevent her from harming herself or staff.  She is confused.  She has no focal weakness.  She is agitated, but not violent or verbally abusive.       Medications  Current Facility-Administered Medications   Medication Dose Route Frequency Provider Last Rate Last Admin    thiamine (B-1) 500 mg in dextrose 5% 100 mL IVPB  500 mg Intravenous TID Clara Ren, A.P.R.N. 200 mL/hr at 04/24/23 0410 500 mg at 04/24/23 0410    dexmedetomidine (PRECEDEX) 400 mcg/100mL NS premix infusion  0.1-1 mcg/kg/hr (Ideal) Intravenous Continuous Flavio P  Dru Brizuela M.D.        LORazepam (ATIVAN) injection 2 mg  2 mg Intravenous Q4HRS Flavio Mistry M.D.        LORazepam (ATIVAN) injection 1-2 mg  1-2 mg Intravenous Q2HRS PRN Flavio Mistry M.D.        potassium phosphate IVPB 30 mmol in 500 mL D5W (premix)  30 mmol Intravenous Once Flavio Mistry M.D.        magnesium sulfate IVPB premix 4 g  4 g Intravenous Once Flavio Mistry M.D.        lactated ringers infusion   Intravenous Continuous Ceferino Hong M.D. 100 mL/hr at 04/23/23 1010 New Bag at 04/23/23 1010    piperacillin-tazobactam (Zosyn) 4.5 g in  mL IVPB  4.5 g Intravenous Q8HRS Ceferino Hong M.D.   Stopped at 04/24/23 0122    lactulose 20 GM/30ML solution 30 mL  30 mL Oral TID BECKY GentilePDevonteR.N.   30 mL at 04/23/23 1728    omeprazole (PRILOSEC) capsule 20 mg  20 mg Oral DAILY Gabe Suarez D.ODevonte   20 mg at 04/23/23 0501    senna-docusate (PERICOLACE or SENOKOT S) 8.6-50 MG per tablet 2 Tablet  2 Tablet Oral BID Abhay Guillory M.D.   2 Tablet at 04/21/23 1729    And    polyethylene glycol/lytes (MIRALAX) PACKET 1 Packet  1 Packet Oral QDAY PRN Abhay Guillory M.D.        And    magnesium hydroxide (MILK OF MAGNESIA) suspension 30 mL  30 mL Oral QDAY PRN Abhay Guillory M.D.        And    bisacodyl (DULCOLAX) suppository 10 mg  10 mg Rectal QDAY PRN Abhay Guillory M.D.        labetalol (NORMODYNE/TRANDATE) injection 10 mg  10 mg Intravenous Q4HRS PRN Abhay Guillory M.D.        acetaminophen (Tylenol) tablet 650 mg  650 mg Oral Q6HRS PRN Abhay Guillory M.D.   650 mg at 04/23/23 2344    Pharmacy Consult Request ...Pain Management Review 1 Each  1 Each Other PHARMACY TO DOSE Abhay Guillory M.D.        oxyCODONE immediate-release (ROXICODONE) tablet 5 mg  5 mg Oral Q3HRS PRN Abhay Guillory M.D.   5 mg at 04/19/23 1517    Or    oxyCODONE immediate release (ROXICODONE) tablet 10 mg  10 mg Oral Q3HRS PRN Abhay Guillory M.D.   10 mg at 04/23/23  2111    ondansetron (ZOFRAN) syringe/vial injection 4 mg  4 mg Intravenous Q4HRS PRN Abhay Guillory M.D.   4 mg at 04/22/23 1428    ondansetron (ZOFRAN ODT) dispertab 4 mg  4 mg Oral Q4HRS PRN Abhay Guillory M.D.        promethazine (PHENERGAN) tablet 12.5-25 mg  12.5-25 mg Oral Q4HRS PRN Abhay Guillory M.D.   25 mg at 04/21/23 0820    promethazine (PHENERGAN) suppository 12.5-25 mg  12.5-25 mg Rectal Q4HRS PRN Abhay Guillory M.D.        prochlorperazine (COMPAZINE) injection 5-10 mg  5-10 mg Intravenous Q4HRS PRN Abhay Guillory M.D.        citalopram (CeleXA) tablet 20 mg  20 mg Oral DAILY Abhay Guillory M.D.   20 mg at 04/23/23 0500    riFAXIMin (XIFAXAN) tablet 550 mg  550 mg Oral DAILY Abhay Guillory M.D.   550 mg at 04/23/23 0501    spironolactone (ALDACTONE) tablet 50 mg  50 mg Oral BID Abhay Guillory M.D.   50 mg at 04/23/23 1400       Fluids    Intake/Output Summary (Last 24 hours) at 4/24/2023 0436  Last data filed at 4/23/2023 1700  Gross per 24 hour   Intake 320 ml   Output 2300 ml   Net -1980 ml       Laboratory      Recent Labs     04/22/23  0036 04/23/23  0149 04/24/23  0028   SODIUM 135 133* 134*   POTASSIUM 3.8 3.9 3.6   CHLORIDE 103 102 103   CO2 19* 21 20   BUN 3* <2* 2*   CREATININE 0.48* 0.44* 0.39*   MAGNESIUM  --   --  1.5   PHOSPHORUS  --   --  1.6*   CALCIUM 7.3* 7.9* 8.5     Recent Labs     04/22/23  0036 04/23/23  0149 04/24/23  0028   ALTSGPT 23  --  17   ASTSGOT 34  --  25   ALKPHOSPHAT 82  --  88   TBILIRUBIN 1.0  --  1.5   LIPASE  --   --  19   GLUCOSE 110* 95 124*     Recent Labs     04/22/23  0036 04/23/23  0149 04/24/23  0028   WBC 4.5* 6.2 7.2   NEUTSPOLYS 83.20* 78.40* 81.10*   LYMPHOCYTES 8.90* 11.10* 7.90*   MONOCYTES 5.30 7.30 9.60   EOSINOPHILS 2.20 1.50 0.40   BASOPHILS 0.20 0.20 0.30   ASTSGOT 34  --  25   ALTSGPT 23  --  17   ALKPHOSPHAT 82  --  88   TBILIRUBIN 1.0  --  1.5     Recent Labs     04/22/23  0036 04/23/23  0149 04/23/23  1305  04/24/23  0028   RBC 3.32* 3.52*  --  3.62*   HEMOGLOBIN 9.2* 9.8*  --  10.1*   HEMATOCRIT 29.4* 31.1*  --  31.4*   PLATELETCT 51* 63*  --  68*   PROTHROMBTM  --   --  20.3*  --    INR  --   --  1.79*  --        Imaging  CT:    Reviewed    Assessment/Plan      Alcohol dependence with withdrawal delirium   I am titrating dexmedetomidine and administering intravenous lorazepam based upon serial RASS assessments   High-dose IV thiamine and supplemental vitamins   Cessation counseling when clinically appropriate    Pneumonia   Nasal MRSA PCR negative   Blood cultures negative from 4/22   Negative for influenza, RSV and SARS-CoV-2   Respiratory viral panel positive for human metapneumovirus by PCR   Continue empiric Zosyn for now    Acute upper gastrointestinal hemorrhage   EGD with distal esophageal varices with red tamica sign - S/P banding and portal hypertensive gastropathy   Continue PPI    History of autoimmune hepatitis    Hypokalemia   Replete potassium    Hypophosphatemia   Replete phosphorus    Hypomagnesemia   Replete magnesium      VTE:  Contraindicated  Ulcer: PPI  Lines: None    I have performed a physical exam and reviewed and updated ROS and Plan today (4/24/2023). In review of yesterday's note (4/23/2023), there are no changes except as documented above.     I have assessed and reassessed her respiratory status, blood pressure, hemodynamics, cardiovascular status and her neurologic status with the titration of dexmedetomidine.  She is at increased risk for worsening respiratory, cardiovascular and CNS system dysfunction.    Discussed patient condition and risk of morbidity and/or mortality with RN    The patient remains critically ill.  Critical care time = 105 minutes in directly providing and coordinating critical care and extensive data review.  No time overlap and excludes procedures.    A Critical Care Medicine progress note may have been authored by a resident physician or advanced practitioner of  nursing under my direct supervision on this date of service.  As the supervising and attending physician, I have either attested to or cosigned that document.  IN THE EVENT THAT DISCREPANCIES EXIST BETWEEN THIS DOCUMENT AND ANY DOCUMENT THAT I HAVE ATTESTED TO OR COSIGNED ON THIS DATE OF SERVICE, THEN THIS DOCUMENT REMAINS THE FINAL AUTHORITY AS TO MY ASSESSMENT AND PLAN REGARDING THE CARE OF THIS PATIENT.    Flavio Mistry MD  Pulmonary and Critical Care Medicine

## 2023-04-24 NOTE — PROGRESS NOTES
"Noc Cross Coverage Progress Note:     Briefly, Tonie Tineo is a pleasant 37-year-old female with a past medical history of autoimmune hepatitis and alcohol abuse who was admitted on 4/18/2023 with GI bleed and sepsis 2/2 to CAP. The patient underwent EGD which revealed esophageal varices s/p banding. She completed a course of antibiotics for CAP however was noted to be spiking fevers on 4/23. A repeat infectious work-up was ordered significant for positive viral respiratory PCR for HMPV. CT obtained on 4/24 revealed bilateral ill-defined pulmonary groundglass infiltrates suspicious for atypical pneumonia or other inflammatory process and inflammatory stranding and fluid adjacent to the pancreas suspicious for acute pancreatitis.     I was contacted by the patient's bedside RN regarding AMS and agitation this morning. The patient was found by the bedside nurse to be pacing the hallways and climbing in the windowsill. Due to patient's history of ETOH abuse, CIWA protocol was initiated. The patient has now received Ativan 8mg IV in the last 1.5 hours.     The patient was seen and evaluated at bedside. She is sitting up in bed, pulling at restraints. She appears to be hallucinating, saying that there are \" in the room.\" She denies pain. She does endorse drinking 2 pints of 99 Bananas daily.     On physical exam, she opens eyes to voice. She is not following commands. PERRLA. No facial droop or slurred speech. She is moving her extremities equally. Lung sounds are clear to ausculation in all fields, effort is normal. Heart sounds are normal. Her abd is soft, slightly tender over RUQ quadrant. She has no lower extremity edema.     Plan:   High dose thiamine ordered   Critical care intensivist, Dr. Cardenas, consulted for consideration of upgrade to higher level of care for management of ETOH withdrawal. The patient will be transferred to the ICU for ongoing management.     YE Gentile " Hospitalist

## 2023-04-24 NOTE — PROGRESS NOTES
4 Eyes Skin Assessment Completed by SALVATORE Sams and SALVATORE Boogie.    Head Scab  Ears Redness  Nose WDL  Mouth WDL  Neck WDL  Breast/Chest WDL  Shoulder Blades WDL  Spine WDL  (R) Arm/Elbow/Hand Redness, Non-Blanching, and Swelling  (L) Arm/Elbow/Hand WDL  Abdomen WDL  Groin WDL  Scrotum/Coccyx/Buttocks Redness and Blanching  (R) Leg WDL  (L) Leg WDL  (R) Heel/Foot/Toe Redness and Blanching  (L) Heel/Foot/Toe Redness and Blanching          Devices In Places ECG, Blood Pressure Cuff, Pulse Ox, and Nasal Cannula      Interventions In Place Gray Ear Foams, Sacral Mepilex, Pillows, Q2 Turns, Low Air Loss Mattress, and Heels Loaded W/Pillows    Possible Skin Injury Yes    Pictures Uploaded Into Epic Yes  Wound Consult Placed Yes  RN Wound Prevention Protocol Ordered Yes

## 2023-04-25 PROBLEM — F11.20 OPIOID DEPENDENCE (HCC): Status: ACTIVE | Noted: 2023-04-25

## 2023-04-25 PROBLEM — F11.90 CHRONIC, CONTINUOUS USE OF OPIOIDS: Status: ACTIVE | Noted: 2023-04-25

## 2023-04-25 LAB
ANION GAP SERPL CALC-SCNC: 11 MMOL/L (ref 7–16)
BASOPHILS # BLD AUTO: 0.3 % (ref 0–1.8)
BASOPHILS # BLD: 0.01 K/UL (ref 0–0.12)
BUN SERPL-MCNC: 4 MG/DL (ref 8–22)
CALCIUM SERPL-MCNC: 8 MG/DL (ref 8.5–10.5)
CHLORIDE SERPL-SCNC: 114 MMOL/L (ref 96–112)
CO2 SERPL-SCNC: 19 MMOL/L (ref 20–33)
CREAT SERPL-MCNC: 0.32 MG/DL (ref 0.5–1.4)
CRP SERPL HS-MCNC: 6.65 MG/DL (ref 0–0.75)
EOSINOPHIL # BLD AUTO: 0.06 K/UL (ref 0–0.51)
EOSINOPHIL NFR BLD: 1.8 % (ref 0–6.9)
ERYTHROCYTE [DISTWIDTH] IN BLOOD BY AUTOMATED COUNT: 61.9 FL (ref 35.9–50)
GFR SERPLBLD CREATININE-BSD FMLA CKD-EPI: 136 ML/MIN/1.73 M 2
GLUCOSE SERPL-MCNC: 114 MG/DL (ref 65–99)
HCT VFR BLD AUTO: 33.8 % (ref 37–47)
HGB BLD-MCNC: 10.6 G/DL (ref 12–16)
IMM GRANULOCYTES # BLD AUTO: 0.02 K/UL (ref 0–0.11)
IMM GRANULOCYTES NFR BLD AUTO: 0.6 % (ref 0–0.9)
LYMPHOCYTES # BLD AUTO: 0.36 K/UL (ref 1–4.8)
LYMPHOCYTES NFR BLD: 10.5 % (ref 22–41)
MAGNESIUM SERPL-MCNC: 2 MG/DL (ref 1.5–2.5)
MCH RBC QN AUTO: 27.9 PG (ref 27–33)
MCHC RBC AUTO-ENTMCNC: 31.4 G/DL (ref 33.6–35)
MCV RBC AUTO: 88.9 FL (ref 81.4–97.8)
MONOCYTES # BLD AUTO: 0.44 K/UL (ref 0–0.85)
MONOCYTES NFR BLD AUTO: 12.9 % (ref 0–13.4)
NEUTROPHILS # BLD AUTO: 2.53 K/UL (ref 2–7.15)
NEUTROPHILS NFR BLD: 73.9 % (ref 44–72)
NRBC # BLD AUTO: 0 K/UL
NRBC BLD-RTO: 0 /100 WBC
PHOSPHATE SERPL-MCNC: 3 MG/DL (ref 2.5–4.5)
PLATELET # BLD AUTO: 73 K/UL (ref 164–446)
PMV BLD AUTO: 10.8 FL (ref 9–12.9)
POTASSIUM SERPL-SCNC: 4 MMOL/L (ref 3.6–5.5)
PREALB SERPL-MCNC: 3.3 MG/DL (ref 18–38)
RBC # BLD AUTO: 3.8 M/UL (ref 4.2–5.4)
SODIUM SERPL-SCNC: 144 MMOL/L (ref 135–145)
WBC # BLD AUTO: 3.4 K/UL (ref 4.8–10.8)

## 2023-04-25 PROCEDURE — 83735 ASSAY OF MAGNESIUM: CPT

## 2023-04-25 PROCEDURE — 700111 HCHG RX REV CODE 636 W/ 250 OVERRIDE (IP): Performed by: NURSE PRACTITIONER

## 2023-04-25 PROCEDURE — 700102 HCHG RX REV CODE 250 W/ 637 OVERRIDE(OP): Performed by: NURSE PRACTITIONER

## 2023-04-25 PROCEDURE — A9270 NON-COVERED ITEM OR SERVICE: HCPCS | Performed by: NURSE PRACTITIONER

## 2023-04-25 PROCEDURE — 700105 HCHG RX REV CODE 258

## 2023-04-25 PROCEDURE — 700105 HCHG RX REV CODE 258: Performed by: NURSE PRACTITIONER

## 2023-04-25 PROCEDURE — 99291 CRITICAL CARE FIRST HOUR: CPT | Performed by: NURSE PRACTITIONER

## 2023-04-25 PROCEDURE — 84100 ASSAY OF PHOSPHORUS: CPT

## 2023-04-25 PROCEDURE — 84134 ASSAY OF PREALBUMIN: CPT

## 2023-04-25 PROCEDURE — 700111 HCHG RX REV CODE 636 W/ 250 OVERRIDE (IP)

## 2023-04-25 PROCEDURE — 700101 HCHG RX REV CODE 250: Performed by: NURSE PRACTITIONER

## 2023-04-25 PROCEDURE — 86140 C-REACTIVE PROTEIN: CPT

## 2023-04-25 PROCEDURE — 80048 BASIC METABOLIC PNL TOTAL CA: CPT

## 2023-04-25 PROCEDURE — 85025 COMPLETE CBC W/AUTO DIFF WBC: CPT

## 2023-04-25 PROCEDURE — 94669 MECHANICAL CHEST WALL OSCILL: CPT

## 2023-04-25 PROCEDURE — 770022 HCHG ROOM/CARE - ICU (200)

## 2023-04-25 RX ORDER — OXYCODONE HYDROCHLORIDE 5 MG/1
5 TABLET ORAL EVERY 4 HOURS PRN
Status: DISCONTINUED | OUTPATIENT
Start: 2023-04-25 | End: 2023-04-26

## 2023-04-25 RX ORDER — NOREPINEPHRINE BITARTRATE 0.03 MG/ML
0-1 INJECTION, SOLUTION INTRAVENOUS CONTINUOUS
Status: DISCONTINUED | OUTPATIENT
Start: 2023-04-25 | End: 2023-04-25

## 2023-04-25 RX ORDER — SODIUM CHLORIDE 9 MG/ML
1000 INJECTION, SOLUTION INTRAVENOUS ONCE
Status: COMPLETED | OUTPATIENT
Start: 2023-04-25 | End: 2023-04-25

## 2023-04-25 RX ORDER — SODIUM CHLORIDE, SODIUM LACTATE, POTASSIUM CHLORIDE, AND CALCIUM CHLORIDE .6; .31; .03; .02 G/100ML; G/100ML; G/100ML; G/100ML
1000 INJECTION, SOLUTION INTRAVENOUS ONCE
Status: COMPLETED | OUTPATIENT
Start: 2023-04-25 | End: 2023-04-25

## 2023-04-25 RX ORDER — LACTULOSE 20 G/30ML
30 SOLUTION ORAL DAILY
Status: DISCONTINUED | OUTPATIENT
Start: 2023-04-26 | End: 2023-04-27 | Stop reason: HOSPADM

## 2023-04-25 RX ORDER — OXYCODONE AND ACETAMINOPHEN 10; 325 MG/1; MG/1
1 TABLET ORAL EVERY 8 HOURS
Status: DISCONTINUED | OUTPATIENT
Start: 2023-04-25 | End: 2023-04-26

## 2023-04-25 RX ORDER — OXYCODONE HYDROCHLORIDE 5 MG/1
5 TABLET ORAL EVERY 6 HOURS
Status: DISCONTINUED | OUTPATIENT
Start: 2023-04-25 | End: 2023-04-25

## 2023-04-25 RX ADMIN — RIFAXIMIN 550 MG: 550 TABLET ORAL at 05:17

## 2023-04-25 RX ADMIN — CHLORDIAZEPOXIDE HYDROCHLORIDE 25 MG: 25 CAPSULE ORAL at 13:58

## 2023-04-25 RX ADMIN — GABAPENTIN 200 MG: 100 CAPSULE ORAL at 21:10

## 2023-04-25 RX ADMIN — LACTULOSE 30 ML: 10 SOLUTION ORAL at 05:17

## 2023-04-25 RX ADMIN — OXYCODONE 5 MG: 5 TABLET ORAL at 07:25

## 2023-04-25 RX ADMIN — NOREPINEPHRINE BITARTRATE 0.05 MCG/KG/MIN: 1 INJECTION, SOLUTION, CONCENTRATE INTRAVENOUS at 05:55

## 2023-04-25 RX ADMIN — CHLORDIAZEPOXIDE HYDROCHLORIDE 25 MG: 25 CAPSULE ORAL at 05:17

## 2023-04-25 RX ADMIN — ACETAMINOPHEN 650 MG: 325 TABLET, FILM COATED ORAL at 05:42

## 2023-04-25 RX ADMIN — CHLORDIAZEPOXIDE HYDROCHLORIDE 25 MG: 25 CAPSULE ORAL at 21:09

## 2023-04-25 RX ADMIN — GABAPENTIN 200 MG: 100 CAPSULE ORAL at 13:57

## 2023-04-25 RX ADMIN — OXYCODONE AND ACETAMINOPHEN 1 TABLET: 10; 325 TABLET ORAL at 21:10

## 2023-04-25 RX ADMIN — THIAMINE HYDROCHLORIDE 500 MG: 100 INJECTION, SOLUTION INTRAMUSCULAR; INTRAVENOUS at 12:15

## 2023-04-25 RX ADMIN — OXYCODONE 5 MG: 5 TABLET ORAL at 12:17

## 2023-04-25 RX ADMIN — SODIUM CHLORIDE 1000 ML: 9 INJECTION, SOLUTION INTRAVENOUS at 00:23

## 2023-04-25 RX ADMIN — GABAPENTIN 200 MG: 100 CAPSULE ORAL at 05:17

## 2023-04-25 RX ADMIN — OMEPRAZOLE 40 MG: KIT at 17:01

## 2023-04-25 RX ADMIN — SODIUM CHLORIDE, POTASSIUM CHLORIDE, SODIUM LACTATE AND CALCIUM CHLORIDE 1000 ML: 600; 310; 30; 20 INJECTION, SOLUTION INTRAVENOUS at 09:05

## 2023-04-25 RX ADMIN — OMEPRAZOLE 40 MG: KIT at 05:42

## 2023-04-25 RX ADMIN — THIAMINE HYDROCHLORIDE 500 MG: 100 INJECTION, SOLUTION INTRAMUSCULAR; INTRAVENOUS at 05:26

## 2023-04-25 RX ADMIN — OXYCODONE AND ACETAMINOPHEN 1 TABLET: 10; 325 TABLET ORAL at 13:57

## 2023-04-25 RX ADMIN — THIAMINE HYDROCHLORIDE 500 MG: 100 INJECTION, SOLUTION INTRAMUSCULAR; INTRAVENOUS at 17:30

## 2023-04-25 RX ADMIN — SODIUM CHLORIDE, POTASSIUM CHLORIDE, SODIUM LACTATE AND CALCIUM CHLORIDE: 600; 310; 30; 20 INJECTION, SOLUTION INTRAVENOUS at 17:32

## 2023-04-25 RX ADMIN — CITALOPRAM HYDROBROMIDE 20 MG: 20 TABLET ORAL at 05:17

## 2023-04-25 RX ADMIN — CEFTRIAXONE SODIUM 2000 MG: 10 INJECTION, POWDER, FOR SOLUTION INTRAVENOUS at 05:17

## 2023-04-25 ASSESSMENT — FIBROSIS 4 INDEX: FIB4 SCORE: 3.39

## 2023-04-25 ASSESSMENT — PATIENT HEALTH QUESTIONNAIRE - PHQ9
SUM OF ALL RESPONSES TO PHQ9 QUESTIONS 1 AND 2: 0
1. LITTLE INTEREST OR PLEASURE IN DOING THINGS: NOT AT ALL
2. FEELING DOWN, DEPRESSED, IRRITABLE, OR HOPELESS: NOT AT ALL

## 2023-04-25 ASSESSMENT — PAIN DESCRIPTION - PAIN TYPE
TYPE: ACUTE PAIN
TYPE: CHRONIC PAIN
TYPE: ACUTE PAIN

## 2023-04-25 NOTE — PROGRESS NOTES
Pt seen for placement of BMS. MD order verified. Pt assessed, noted to be incontinent of loose brown stool. Sacrum/buttocks pink and intact. Sphincter tone determined to be adequate. BMS placed without difficulty, 40 mL of tap water placed in retention balloon, irrigated with 60 mL warm tap water. Nursing to irrigate q shift with 60 mL-120 mL warm tap water or until patent.

## 2023-04-25 NOTE — WOUND TEAM
BMS consult received, BMS was placed by superuser. Rectal tube care orders in place for nursing. No advanced wound care needs. Please call for questions or concerns.

## 2023-04-25 NOTE — DIETARY
Nutrition Services Brief Update:    Problem: Nutritional:  Goal: Nutrition support tolerated and meeting greater than 85% of estimated needs  Outcome: MET    Pt is receiving TF Fibersource HN at goal rate 60 ml/hr.     RD following.

## 2023-04-25 NOTE — CARE PLAN
The patient is Watcher - Medium risk of patient condition declining or worsening    Shift Goals  Clinical Goals: RASS -1 to +1, VSS  Patient Goals: ANTWAN  Family Goals: ANTWAN    Progress made toward(s) clinical / shift goals:      Problem: Pain - Standard  Goal: Alleviation of pain or a reduction in pain to the patient’s comfort goal  Outcome: Progressing - Rosa Maria BELCHER addressed pt's home pain regimen, percocet ordered Q8 hours.     Problem: Hemodynamics  Goal: Patient's hemodynamics, fluid balance and neurologic status will be stable or improve  Outcome: Progressing - pt hypotensive in the 80s this morning, 1L LR bolus given and precedex turned off.  Levophed gtt had been turned off by noc RN this morning.     Problem: Nutrition  Goal: Patient's nutritional and fluid intake will be adequate or improve  Outcome: Progressing - pt more awake, passed bedside swallow and diet ordered.  Will continue to give TFs until appetite resumes.       Problem: Safety - Medical Restraint  Goal: Free from restraint(s) (Restraint for Interference with Medical Device)  Outcome: Met - pt's confusion has cleared and now A&Ox4.  Pt taken out of restraints and has not attempted to pull at IRIS or lines or get out of bed.         Patient is not progressing towards the following goals:      Problem: Bowel Elimination  Goal: Establish and maintain regular bowel function  Outcome: Not Progressing - pt incontinent of large loose bowel movements, BMS placed this morning.

## 2023-04-25 NOTE — CARE PLAN
Problem: Hyperinflation  Goal: Prevent or improve atelectasis  Description: Target End Date:  3 to 4 days    1. Instruct incentive spirometry usage  2.  Perform hyperinflation therapy as indicated  Outcome: Not Met   PEP

## 2023-04-25 NOTE — CARE PLAN
The patient is Watcher - Medium risk of patient condition declining or worsening    Shift Goals  Clinical Goals: decrease agitation  Patient Goals: sofie  Family Goals: sofie    Progress made toward(s) clinical / shift goals:      - Pt extremely agitated at beginning of shift due to ETOH withdrawal, precedex gtt started and received one time dose of versed for line/tube placements, PRN ativan given x1 and PO meds started.  Pt has been sedated to a RASS of -2 with these medications.      Patient is not progressing towards the following goals:      Problem: Knowledge Deficit - Standard  Goal: Patient and family/care givers will demonstrate understanding of plan of care, disease process/condition, diagnostic tests and medications  Outcome: Not Progressing - pt going through ETOH withdrawals    Goal: Patient's ability to verbalize feelings about condition will improve  Outcome: Not Progressing - Pt sedated as going through withdrawals  Goal: Patient's ability to re-evaluate and adapt role responsibilities will improve  Outcome: Not Progressing - Pt sedated as going through withdrawals     Problem: Communication  Goal: The ability to communicate needs accurately and effectively will improve  Outcome: Not Progressing - Pt sedated as going through withdrawals     Problem: Nutrition  Goal: Patient's nutritional and fluid intake will be adequate or improve  Outcome: Not Progressing - Pt unable to take PO intake, IRIS placed and TFs started     Problem: Psychosocial  Goal: Patient's level of anxiety will decrease  Outcome: Not Progressing - Pt sedated as going through withdrawals     Problem: Safety - Medical Restraint  Goal: Free from restraint(s) (Restraint for Interference with Medical Device)  Outcome: Not Progressing - pt remains restrained so she doesn't pull out lines and IRIS due to confusion and agitation.

## 2023-04-25 NOTE — PROGRESS NOTES
"Critical Care Progress Note    Date of admission  4/18/2023    Chief Complaint  Altered Mental Status    Hospital Course  Tonie Tineo is a 37 year old female with PMH significant for ETOH, autoimmune Hepatitis, and variceal bleeding s/p banding 2 years ago in [place] who [place] [facility] [place] with hematemesis. She presented to outside facility via law enforcement with vomiting bright red blood and 7/10 abdominal pain. Per report, last BM AM 4/23 without evidence of melena or blood. Patient had been binge drinking all day.   She did not have any vomiting in the ER of [facility]. Vitals showed 's, with stable BP. Hemoglobin 11.4, 11.1.  Platelet count 44.  Lactic acid 3.8. ETOH level 20. INR 1.6.  She was admitted to the Hospitalist service     4/19 - GI consult. MELD 12. Platelets 29, transfused. EGD with 2 bands placed.   4/20 - ABX for LLL pneumonia.  4/21 - 4/23 - fevers, hypoxia. CT chest/abdomen/pelvis with contrast showed diffuse bilateral groundglass infiltrates MI: Acute on chronic pancreatitis; thickening of duodenal wall. (-) COVID.    AM of 4/24 she developed signs of acute alcohol withdrawal requiring about 16 mg Ativan and was transferred to ICU for Precedex infusion and close hemodynamic monitoring.     Interval Problem Update  Reviewed last 24 hour events:  Overnight: hypothermia (95) requiring Francois Hugger. Hypotension 70-80's requiring fluid bolus.  Temp this am 100 - Francois Hugger off  SR 80's  SBP's 's on Levo off. Another 1 L LR bolus  Neuro: oriented to self.  Dex off overnight - back on this morning at 0.1.  Scheduled Narcotics  4L NC 94-97%. Loose productive cough.  Horacio, WILLIAM x 3  Rectal Tube   Mobility 1    1:51 PM - patient is oriented and appropriate \"I don't remember the past 2 days\". Patient is tremulous stating \"I'm withdrawing from my pain meds\". Patient tells me she takes her meds as prescribed - 3 (15mg) IR oxycontin and 7.5 mg Percocet PO TID. We " discussed not putting her back on all of her pain meds at once. We only have 5 and 10 mg Percocet on formulary here, therefore we will start 10 mg PO Percocet scheduled q8h for now and adjust appropriately. Patient is understanding and agreeable to this plan.     Review of Systems  Review of Systems   Unable to perform ROS: Mental acuity      Vital Signs for last 24 hours   Temp:  [36.1 °C (96.9 °F)] 36.1 °C (96.9 °F)  Pulse:  [61-98] 87  Resp:  [14-73] 27  BP: ()/(49-79) 100/58  SpO2:  [92 %-99 %] 96 %    Hemodynamic parameters for last 24 hours       Respiratory Information for the last 24 hours       Physical Exam   Physical Exam  Vitals and nursing note reviewed.   Constitutional:       General: She is sleeping. She is not in acute distress.     Appearance: Normal appearance. She is ill-appearing. She is not toxic-appearing.      Interventions: Nasal cannula in place.   HENT:      Head: Normocephalic and atraumatic.      Nose: Nose normal.      Mouth/Throat:      Lips: Pink.      Mouth: Mucous membranes are moist.   Eyes:      Pupils: Pupils are equal, round, and reactive to light.   Cardiovascular:      Rate and Rhythm: Normal rate and regular rhythm.      Pulses: Normal pulses.      Heart sounds: Normal heart sounds.   Pulmonary:      Effort: Pulmonary effort is normal.      Breath sounds: Normal breath sounds. No wheezing.   Abdominal:      General: Bowel sounds are normal.      Palpations: Abdomen is soft.      Tenderness: There is no abdominal tenderness.   Musculoskeletal:      Right lower leg: No edema.      Left lower leg: No edema.      Comments: AUSTIN 5/5   Skin:     General: Skin is warm and dry.      Capillary Refill: Capillary refill takes less than 2 seconds.   Neurological:      GCS: GCS eye subscore is 4. GCS verbal subscore is 5. GCS motor subscore is 6.      Motor: Motor function is intact.   Psychiatric:         Mood and Affect: Mood normal.         Cognition and Memory: She exhibits  impaired recent memory.         Judgment: Judgment normal.       Medications  Current Facility-Administered Medications   Medication Dose Route Frequency Provider Last Rate Last Admin    [START ON 4/26/2023] lactulose 20 GM/30ML solution 30 mL  30 mL Enteral Tube DAILY JACK Quiroz.P.R.N.        omeprazole (FIRST-OMEPRAZOLE) 2 mg/mL oral susp 40 mg  40 mg Enteral Tube Q12HRS Rosa Maria Lafleur A.P.R.N.        oxyCODONE immediate-release (ROXICODONE) tablet 5 mg  5 mg Enteral Tube Q4HRS PRN Rosa Maria Lfaleur A.P.R.N.        oxyCODONE-acetaminophen (PERCOCET-10)  MG per tablet 1 Tablet  1 Tablet Enteral Tube Q8HRS Rosa Maria Lafleur A.P.R.N.   1 Tablet at 04/25/23 1357    thiamine (B-1) 500 mg in dextrose 5% 100 mL IVPB  500 mg Intravenous TID BECKY GentileP.R.NDevonte   Stopped at 04/25/23 1245    LORazepam (ATIVAN) injection 1-2 mg  1-2 mg Intravenous Q2HRS PRN Flavio Mistry M.D.   2 mg at 04/24/23 1021    chlordiazePOXIDE (Librium) capsule 25 mg  25 mg Enteral Tube Q8HRS Rosa Maria Lafleur A.P.R.N.   25 mg at 04/25/23 1358    Followed by    [START ON 4/26/2023] chlordiazePOXIDE (Librium) capsule 25 mg  25 mg Enteral Tube BID Rosa Maria Lafleur A.P.R.N.        Followed by    [START ON 4/27/2023] chlordiazePOXIDE (Librium) capsule 25 mg  25 mg Enteral Tube DAILY Rosa Maria Lafleur A.P.R.N.        Pharmacy Consult: Enteral tube insertion - review meds/change route/product selection  1 Each Other PHARMACY TO DOSE JACK Quiroz.P.R.N.        acetaminophen (Tylenol) tablet 650 mg  650 mg Enteral Tube Q6HRS PRN JACK Quiroz.P.R.N.   650 mg at 04/25/23 0542    ondansetron (ZOFRAN ODT) dispertab 4 mg  4 mg Enteral Tube Q4HRS PRN Rosa Maria Lafleur, A.P.R.N.        promethazine (PHENERGAN) tablet 12.5-25 mg  12.5-25 mg Enteral Tube Q4HRS PRN Rosa Maria Lafleur, A.P.R.N.        citalopram (CeleXA) tablet 20 mg  20 mg Enteral Tube DAILY Rosa Maria Lafleur, A.P.R.N.   20 mg at 04/25/23 0517    riFAXIMin (XIFAXAN) tablet 550  mg  550 mg Enteral Tube DAILY Rosa Maria Lafleur, A.P.R.N.   550 mg at 04/25/23 0517    gabapentin (NEURONTIN) capsule 200 mg  200 mg Enteral Tube Q8HRS Rosa Maria Lafleur A.P.R.N.   200 mg at 04/25/23 1357    lactated ringers infusion   Intravenous Continuous Dalila L. Latona 50 mL/hr at 04/24/23 2207 New Bag at 04/24/23 2207    labetalol (NORMODYNE/TRANDATE) injection 10 mg  10 mg Intravenous Q4HRS PRN Abhay Guillory M.D.        Pharmacy Consult Request ...Pain Management Review 1 Each  1 Each Other PHARMACY TO DOSE Abhay Guillory M.D.        ondansetron (ZOFRAN) syringe/vial injection 4 mg  4 mg Intravenous Q4HRS PRN Abhay Guillory M.D.   4 mg at 04/22/23 1428    promethazine (PHENERGAN) suppository 12.5-25 mg  12.5-25 mg Rectal Q4HRS PRN Abhay Guillory M.D.        prochlorperazine (COMPAZINE) injection 5-10 mg  5-10 mg Intravenous Q4HRS PRN Abhay Guillory M.D.           Fluids    Intake/Output Summary (Last 24 hours) at 4/25/2023 1437  Last data filed at 4/25/2023 1400  Gross per 24 hour   Intake 5128.76 ml   Output 1625 ml   Net 3503.76 ml       Laboratory          Recent Labs     04/23/23  0149 04/24/23  0028 04/25/23  0411   SODIUM 133* 134* 144   POTASSIUM 3.9 3.6 4.0   CHLORIDE 102 103 114*   CO2 21 20 19*   BUN <2* 2* 4*   CREATININE 0.44* 0.39* 0.32*   MAGNESIUM  --  1.5 2.0   PHOSPHORUS  --  1.6* 3.0   CALCIUM 7.9* 8.5 8.0*     Recent Labs     04/23/23  0149 04/24/23  0028 04/25/23  0411   ALTSGPT  --  17  --    ASTSGOT  --  25  --    ALKPHOSPHAT  --  88  --    TBILIRUBIN  --  1.5  --    LIPASE  --  19  --    PREALBUMIN  --   --  3.3*   GLUCOSE 95 124* 114*     Recent Labs     04/23/23  0149 04/24/23  0028 04/25/23  0411   WBC 6.2 7.2 3.4*   NEUTSPOLYS 78.40* 81.10* 73.90*   LYMPHOCYTES 11.10* 7.90* 10.50*   MONOCYTES 7.30 9.60 12.90   EOSINOPHILS 1.50 0.40 1.80   BASOPHILS 0.20 0.30 0.30   ASTSGOT  --  25  --    ALTSGPT  --  17  --    ALKPHOSPHAT  --  88  --    TBILIRUBIN  --  1.5  --      Recent  Labs     04/23/23  0149 04/23/23  1305 04/24/23  0028 04/25/23  0411   RBC 3.52*  --  3.62* 3.80*   HEMOGLOBIN 9.8*  --  10.1* 10.6*   HEMATOCRIT 31.1*  --  31.4* 33.8*   PLATELETCT 63*  --  68* 73*   PROTHROMBTM  --  20.3*  --   --    INR  --  1.79*  --   --        Imaging  No new imaging today    Assessment/Plan  * Alcohol dependence with withdrawal delirium (HCC)  Assessment & Plan  -Librium taper  -Gabapentin  -high-dose Thiamine  -cessation education and counseling as clinically able    Chronic, continuous use of opioids  Assessment & Plan  - reviewed - patient received 120 (15 mg) IR oxycontin and 90 (7.5 mg) Norco per month from outside pain management  -holding off on reordering her full OP regime. Will start 5 mg oxycodone q4h to avoid acute withdrawal    Agitation requiring sedation protocol  Assessment & Plan  -Precedex weaned off  -resolved    Esophageal varices (HCC)  Assessment & Plan  -s/p banding this admission    Electrolyte disturbance  Assessment & Plan  -due to ETOH use  -replace as clinically indicated  -follow labs    Acute respiratory failure with hypoxia (HCC)  Assessment & Plan  -due to CAP, sedation for ETOH withdrawal  -titrate FIO2 to keep sats > 92%  -encourage mobility and incentive spirometry    Community acquired pneumonia  Assessment & Plan  -continue ABX  -encourage mobility and incentive spirometry    Pancytopenia (HCC)  Assessment & Plan  -transfused 4/19  -stable  -follow CBC  -monitor for bleeding    Hepatitis  Assessment & Plan  -history of  -chronic and stable at her baseline         VTE:  Contraindicated  Ulcer: PPI  Lines: Leonard Catheter  Ongoing indication addressed    I have performed a physical exam and reviewed and updated ROS and Plan today (4/25/2023). In review of yesterday's note (4/24/2023), there are no changes except as documented above.     Discussed patient condition and risk of morbidity and/or mortality with RN, RT, Pharmacy, Dietary, , Patient,  and my attending Dr. Gonda.  The patient remains critically ill.  Critical care time = 49 minutes in directly providing and coordinating critical care and extensive data review.  No time overlap and excludes procedures.    Please note that this dictation was created using voice recognition software. I have made every reasonable attempt to correct obvious errors, but there may be errors of grammar and possibly content that I did not discover before finalizing the note.    RAJANI Quiroz.

## 2023-04-25 NOTE — CARE PLAN
Problem: Pain - Standard  Goal: Alleviation of pain or a reduction in pain to the patient’s comfort goal  Outcome: Progressing  Patient has PRN medication available for pain     The patient is Watcher - Medium risk of patient condition declining or worsening    Shift Goals  Clinical Goals: RASS -1 to +1, VSS  Patient Goals: ANTWAN  Family Goals: ANTWAN    Progress made toward(s) clinical / shift goals:  Progressing    Patient is not progressing towards the following goals: n/a      Problem: Safety - Medical Restraint  Goal: Free from restraint(s) (Restraint for Interference with Medical Device)  Outcome: Not Progressing   Patient continues to attempt to remove lines and tubes despite education.

## 2023-04-25 NOTE — ASSESSMENT & PLAN NOTE
- reviewed - patient received 120 (15 mg) IR oxycontin and 90 (7.5 mg) Norco per month from outside pain management  -holding off on reordering her full OP regime. Will start 5 mg oxycodone q4h to avoid acute withdrawal

## 2023-04-26 PROBLEM — R45.1 AGITATION REQUIRING SEDATION PROTOCOL: Status: RESOLVED | Noted: 2023-04-24 | Resolved: 2023-04-26

## 2023-04-26 PROBLEM — F11.90 CHRONIC, CONTINUOUS USE OF OPIOIDS: Chronic | Status: ACTIVE | Noted: 2023-04-25

## 2023-04-26 PROBLEM — K75.9 HEPATITIS: Chronic | Status: ACTIVE | Noted: 2023-04-19

## 2023-04-26 LAB
ANION GAP SERPL CALC-SCNC: 8 MMOL/L (ref 7–16)
BASOPHILS # BLD AUTO: 0.4 % (ref 0–1.8)
BASOPHILS # BLD: 0.02 K/UL (ref 0–0.12)
BUN SERPL-MCNC: 5 MG/DL (ref 8–22)
CALCIUM SERPL-MCNC: 7.6 MG/DL (ref 8.5–10.5)
CHLORIDE SERPL-SCNC: 109 MMOL/L (ref 96–112)
CO2 SERPL-SCNC: 21 MMOL/L (ref 20–33)
CREAT SERPL-MCNC: 0.27 MG/DL (ref 0.5–1.4)
EOSINOPHIL # BLD AUTO: 0.11 K/UL (ref 0–0.51)
EOSINOPHIL NFR BLD: 2.4 % (ref 0–6.9)
ERYTHROCYTE [DISTWIDTH] IN BLOOD BY AUTOMATED COUNT: 61.8 FL (ref 35.9–50)
GFR SERPLBLD CREATININE-BSD FMLA CKD-EPI: 142 ML/MIN/1.73 M 2
GLUCOSE SERPL-MCNC: 108 MG/DL (ref 65–99)
HCT VFR BLD AUTO: 28.8 % (ref 37–47)
HGB BLD-MCNC: 9.2 G/DL (ref 12–16)
IMM GRANULOCYTES # BLD AUTO: 0.01 K/UL (ref 0–0.11)
IMM GRANULOCYTES NFR BLD AUTO: 0.2 % (ref 0–0.9)
LYMPHOCYTES # BLD AUTO: 0.78 K/UL (ref 1–4.8)
LYMPHOCYTES NFR BLD: 17.1 % (ref 22–41)
MAGNESIUM SERPL-MCNC: 1.9 MG/DL (ref 1.5–2.5)
MCH RBC QN AUTO: 27.9 PG (ref 27–33)
MCHC RBC AUTO-ENTMCNC: 31.9 G/DL (ref 33.6–35)
MCV RBC AUTO: 87.3 FL (ref 81.4–97.8)
MONOCYTES # BLD AUTO: 0.78 K/UL (ref 0–0.85)
MONOCYTES NFR BLD AUTO: 17.1 % (ref 0–13.4)
NEUTROPHILS # BLD AUTO: 2.86 K/UL (ref 2–7.15)
NEUTROPHILS NFR BLD: 62.8 % (ref 44–72)
NRBC # BLD AUTO: 0 K/UL
NRBC BLD-RTO: 0 /100 WBC
PHOSPHATE SERPL-MCNC: 2.4 MG/DL (ref 2.5–4.5)
PLATELET # BLD AUTO: 87 K/UL (ref 164–446)
PMV BLD AUTO: 10.8 FL (ref 9–12.9)
POTASSIUM SERPL-SCNC: 3.9 MMOL/L (ref 3.6–5.5)
RBC # BLD AUTO: 3.3 M/UL (ref 4.2–5.4)
SODIUM SERPL-SCNC: 138 MMOL/L (ref 135–145)
WBC # BLD AUTO: 4.6 K/UL (ref 4.8–10.8)

## 2023-04-26 PROCEDURE — 99233 SBSQ HOSP IP/OBS HIGH 50: CPT | Performed by: NURSE PRACTITIONER

## 2023-04-26 PROCEDURE — 85025 COMPLETE CBC W/AUTO DIFF WBC: CPT

## 2023-04-26 PROCEDURE — 80048 BASIC METABOLIC PNL TOTAL CA: CPT

## 2023-04-26 PROCEDURE — 83735 ASSAY OF MAGNESIUM: CPT

## 2023-04-26 PROCEDURE — 770006 HCHG ROOM/CARE - MED/SURG/GYN SEMI*

## 2023-04-26 PROCEDURE — 84100 ASSAY OF PHOSPHORUS: CPT

## 2023-04-26 PROCEDURE — 99233 SBSQ HOSP IP/OBS HIGH 50: CPT | Performed by: HOSPITALIST

## 2023-04-26 PROCEDURE — A9270 NON-COVERED ITEM OR SERVICE: HCPCS | Performed by: NURSE PRACTITIONER

## 2023-04-26 PROCEDURE — 700102 HCHG RX REV CODE 250 W/ 637 OVERRIDE(OP): Performed by: NURSE PRACTITIONER

## 2023-04-26 PROCEDURE — 700102 HCHG RX REV CODE 250 W/ 637 OVERRIDE(OP): Performed by: HOSPITALIST

## 2023-04-26 PROCEDURE — A9270 NON-COVERED ITEM OR SERVICE: HCPCS | Performed by: HOSPITALIST

## 2023-04-26 PROCEDURE — 94669 MECHANICAL CHEST WALL OSCILL: CPT

## 2023-04-26 RX ORDER — FUROSEMIDE 40 MG/1
40 TABLET ORAL DAILY
Status: DISCONTINUED | OUTPATIENT
Start: 2023-04-26 | End: 2023-04-26

## 2023-04-26 RX ORDER — GABAPENTIN 100 MG/1
200 CAPSULE ORAL EVERY 8 HOURS
Status: DISCONTINUED | OUTPATIENT
Start: 2023-04-26 | End: 2023-04-27 | Stop reason: HOSPADM

## 2023-04-26 RX ORDER — PANTOPRAZOLE SODIUM 40 MG/1
40 TABLET, DELAYED RELEASE ORAL 2 TIMES DAILY
Status: DISCONTINUED | OUTPATIENT
Start: 2023-04-26 | End: 2023-04-26

## 2023-04-26 RX ORDER — FUROSEMIDE 20 MG/1
20 TABLET ORAL DAILY
Status: DISCONTINUED | OUTPATIENT
Start: 2023-04-27 | End: 2023-04-27 | Stop reason: HOSPADM

## 2023-04-26 RX ORDER — MIDODRINE HYDROCHLORIDE 5 MG/1
10 TABLET ORAL 3 TIMES DAILY
Status: DISCONTINUED | OUTPATIENT
Start: 2023-04-26 | End: 2023-04-26

## 2023-04-26 RX ORDER — ACETAMINOPHEN 325 MG/1
650 TABLET ORAL EVERY 6 HOURS PRN
Status: DISCONTINUED | OUTPATIENT
Start: 2023-04-26 | End: 2023-04-27 | Stop reason: HOSPADM

## 2023-04-26 RX ORDER — ONDANSETRON 4 MG/1
4 TABLET, ORALLY DISINTEGRATING ORAL EVERY 4 HOURS PRN
Status: DISCONTINUED | OUTPATIENT
Start: 2023-04-26 | End: 2023-04-27 | Stop reason: HOSPADM

## 2023-04-26 RX ORDER — OMEPRAZOLE 20 MG/1
20 CAPSULE, DELAYED RELEASE ORAL 2 TIMES DAILY
Status: DISCONTINUED | OUTPATIENT
Start: 2023-04-26 | End: 2023-04-27 | Stop reason: HOSPADM

## 2023-04-26 RX ORDER — OXYCODONE HYDROCHLORIDE 5 MG/1
5 TABLET ORAL EVERY 4 HOURS PRN
Status: DISCONTINUED | OUTPATIENT
Start: 2023-04-26 | End: 2023-04-27 | Stop reason: HOSPADM

## 2023-04-26 RX ORDER — OXYCODONE AND ACETAMINOPHEN 10; 325 MG/1; MG/1
1 TABLET ORAL EVERY 8 HOURS
Status: DISCONTINUED | OUTPATIENT
Start: 2023-04-26 | End: 2023-04-27 | Stop reason: HOSPADM

## 2023-04-26 RX ORDER — PROMETHAZINE HYDROCHLORIDE 25 MG/1
12.5-25 TABLET ORAL EVERY 4 HOURS PRN
Status: DISCONTINUED | OUTPATIENT
Start: 2023-04-26 | End: 2023-04-27 | Stop reason: HOSPADM

## 2023-04-26 RX ORDER — CHLORDIAZEPOXIDE HYDROCHLORIDE 25 MG/1
25 CAPSULE, GELATIN COATED ORAL DAILY
Status: DISCONTINUED | OUTPATIENT
Start: 2023-04-27 | End: 2023-04-26

## 2023-04-26 RX ORDER — CHLORDIAZEPOXIDE HYDROCHLORIDE 25 MG/1
25 CAPSULE, GELATIN COATED ORAL 2 TIMES DAILY
Status: COMPLETED | OUTPATIENT
Start: 2023-04-26 | End: 2023-04-26

## 2023-04-26 RX ORDER — MIDODRINE HYDROCHLORIDE 5 MG/1
5 TABLET ORAL 3 TIMES DAILY
Status: DISCONTINUED | OUTPATIENT
Start: 2023-04-26 | End: 2023-04-27

## 2023-04-26 RX ORDER — FUROSEMIDE 40 MG/1
40 TABLET ORAL DAILY
Status: DISCONTINUED | OUTPATIENT
Start: 2023-04-27 | End: 2023-04-26

## 2023-04-26 RX ORDER — CITALOPRAM 20 MG/1
20 TABLET ORAL DAILY
Status: DISCONTINUED | OUTPATIENT
Start: 2023-04-27 | End: 2023-04-27 | Stop reason: HOSPADM

## 2023-04-26 RX ADMIN — MIDODRINE HYDROCHLORIDE 5 MG: 5 TABLET ORAL at 20:06

## 2023-04-26 RX ADMIN — OMEPRAZOLE 20 MG: 20 CAPSULE, DELAYED RELEASE ORAL at 17:27

## 2023-04-26 RX ADMIN — OMEPRAZOLE 40 MG: KIT at 05:28

## 2023-04-26 RX ADMIN — CITALOPRAM HYDROBROMIDE 20 MG: 20 TABLET ORAL at 05:27

## 2023-04-26 RX ADMIN — OXYCODONE 5 MG: 5 TABLET ORAL at 20:06

## 2023-04-26 RX ADMIN — MIDODRINE HYDROCHLORIDE 10 MG: 5 TABLET ORAL at 08:03

## 2023-04-26 RX ADMIN — FUROSEMIDE 40 MG: 40 TABLET ORAL at 08:03

## 2023-04-26 RX ADMIN — MIDODRINE HYDROCHLORIDE 10 MG: 5 TABLET ORAL at 16:31

## 2023-04-26 RX ADMIN — CHLORDIAZEPOXIDE HYDROCHLORIDE 25 MG: 25 CAPSULE ORAL at 17:27

## 2023-04-26 RX ADMIN — OXYCODONE AND ACETAMINOPHEN 1 TABLET: 10; 325 TABLET ORAL at 13:21

## 2023-04-26 RX ADMIN — GABAPENTIN 200 MG: 100 CAPSULE ORAL at 13:21

## 2023-04-26 RX ADMIN — RIFAXIMIN 550 MG: 550 TABLET ORAL at 05:27

## 2023-04-26 RX ADMIN — OXYCODONE AND ACETAMINOPHEN 1 TABLET: 10; 325 TABLET ORAL at 21:30

## 2023-04-26 RX ADMIN — GABAPENTIN 200 MG: 100 CAPSULE ORAL at 05:27

## 2023-04-26 RX ADMIN — GABAPENTIN 200 MG: 100 CAPSULE ORAL at 20:06

## 2023-04-26 RX ADMIN — OXYCODONE AND ACETAMINOPHEN 1 TABLET: 10; 325 TABLET ORAL at 05:27

## 2023-04-26 RX ADMIN — CHLORDIAZEPOXIDE HYDROCHLORIDE 25 MG: 25 CAPSULE ORAL at 05:27

## 2023-04-26 ASSESSMENT — ENCOUNTER SYMPTOMS
DIARRHEA: 1
HEARTBURN: 0
COUGH: 0
WEAKNESS: 1
NERVOUS/ANXIOUS: 1
HEADACHES: 0
EYES NEGATIVE: 1
CHILLS: 0
DIZZINESS: 0
FOCAL WEAKNESS: 0
NECK PAIN: 0
BACK PAIN: 1
BLURRED VISION: 0
DOUBLE VISION: 0
SHORTNESS OF BREATH: 0
BRUISES/BLEEDS EASILY: 0
INSOMNIA: 0
FEVER: 0
WEAKNESS: 0
RESPIRATORY NEGATIVE: 1
CARDIOVASCULAR NEGATIVE: 1
NEUROLOGICAL NEGATIVE: 1
PALPITATIONS: 0
MUSCULOSKELETAL NEGATIVE: 1
VOMITING: 0
DEPRESSION: 0
BACK PAIN: 0
NAUSEA: 0
NAUSEA: 1
ABDOMINAL PAIN: 0
NERVOUS/ANXIOUS: 0
POLYDIPSIA: 0
VOMITING: 1

## 2023-04-26 ASSESSMENT — PAIN DESCRIPTION - PAIN TYPE
TYPE: ACUTE PAIN
TYPE: ACUTE PAIN
TYPE: CHRONIC PAIN

## 2023-04-26 ASSESSMENT — LIFESTYLE VARIABLES: SUBSTANCE_ABUSE: 1

## 2023-04-26 NOTE — CARE PLAN
The patient is Stable - Low risk of patient condition declining or worsening    Shift Goals  Clinical Goals: rass -1 to +1, VSS, pain control  Patient Goals: pain control, get to see kids soon, get tubes out  Family Goals: sofie    Progress made toward(s) clinical / shift goals:      - IRIS, Leonard, and BMS taken out.  - RASS = -1 to 1  - SBP 90s-100s    Problem: Pain - Standard  Goal: Alleviation of pain or a reduction in pain to the patient’s comfort goal  Outcome: Progressing - Percocet Q8      Patient is not progressing towards the following goals:  N/A

## 2023-04-26 NOTE — CARE PLAN
The patient is Stable - Low risk of patient condition declining or worsening    Shift Goals  Clinical Goals: rass -1 to +1, VSS, pain control, pulm toilet  Patient Goals: pain control, get to see kids soon  Family Goals: sofie    Progress made toward(s) clinical / shift goals:    Problem: Pain - Standard  Goal: Alleviation of pain or a reduction in pain to the patient’s comfort goal  Outcome: Progressing     Problem: Knowledge Deficit - Standard  Goal: Patient and family/care givers will demonstrate understanding of plan of care, disease process/condition, diagnostic tests and medications  Outcome: Progressing     Problem: Psychosocial  Goal: Patient's level of anxiety will decrease  Outcome: Progressing  Goal: Patient's ability to verbalize feelings about condition will improve  Outcome: Progressing  Goal: Patient's ability to re-evaluate and adapt role responsibilities will improve  Outcome: Progressing     Problem: Hemodynamics  Goal: Patient's hemodynamics, fluid balance and neurologic status will be stable or improve  Outcome: Progressing     Problem: Risk for Aspiration  Goal: Patient's risk for aspiration will be absent or decrease  Outcome: Progressing     Problem: Urinary Elimination  Goal: Establish and maintain regular urinary output  Outcome: Progressing     Problem: Psychosocial  Goal: Patient's level of anxiety will decrease  Outcome: Progressing     Problem: Fall Risk  Goal: Patient will remain free from falls  Outcome: Progressing     Problem: Skin Integrity  Goal: Skin integrity is maintained or improved  Outcome: Progressing

## 2023-04-26 NOTE — CARE PLAN
The patient is Stable - Low risk of patient condition declining or worsening    Shift Goals  Clinical Goals: pain control, wean offf safely off narcotics  Patient Goals: pain  Family Goals: sofie    Progress made toward(s) clinical / shift goals:      Patient is not progressing towards the following goals:      Problem: Pain - Standard  Goal: Alleviation of pain or a reduction in pain to the patient’s comfort goal  Outcome: Progressing  Note: Patient pain control eased with scheduled pain  medication. Patient seems to be feeling better and ready to get home.

## 2023-04-26 NOTE — PROGRESS NOTES
Report called to Enma CHASE, pt transferred to S520[facility]1 via wheel chair with all personal belongings.  Pt notified that her meds are down in pharmacy.

## 2023-04-26 NOTE — PROGRESS NOTES
Critical Care Progress Note    Date of admission  4/18/2023    Chief Complaint  Altered Mental Status    Hospital Course  Tonie Tineo is a 37 year old female with PMH significant for chronic pain on chronic OP opioid therapy, ETOH, autoimmune Hepatitis, and variceal bleeding s/p banding 2 years ago in [place] who transferred here from [facility] [place] with hematemesis. She presented to outside facility via law enforcement with vomiting bright red blood and 7/10 abdominal pain. Per report, last BM AM 4/23 without evidence of melena or blood. Patient had been binge drinking all day.   She did not have any vomiting in the ER of [facility]. Vitals showed 's, with stable BP. Hemoglobin 11.4, 11.1.  Platelet count 44.  Lactic acid 3.8. ETOH level 20. INR 1.6.  She was admitted to the Hospitalist service     4/19 - GI consult. MELD 12. Platelets 29, transfused. EGD with 2 bands placed.   4/20 - ABX for LLL pneumonia.  4/21 - 4/23 - fevers, hypoxia. CT chest/abdomen/pelvis with contrast showed diffuse bilateral groundglass infiltrates MI: Acute on chronic pancreatitis; thickening of duodenal wall. (-) COVID.    AM of 4/24 she developed signs of acute alcohol withdrawal requiring about 16 mg Ativan and was transferred to ICU for Precedex infusion and close hemodynamic monitoring.   4/25 - alert/oriented. Narcotic withdrawal. SBP 80-90's, improved with IVF. Off Precedex.    Interval Problem Update for TODAY:  Reviewed last 24 hour events:  Diarrhea - rectal tube - holding Lactulose  Afebrile  SR 80's  SBP 80-90's. Restarting home midodrine  Room Air  Neuro: alert, oriented x 3 (disoriented to events of hospitalization), AUSTIN 4-5/5, no focal deficits  IRIS with tube feedings - DC today and advance diet  I/O: 6600/1550 - restarting home Lasix  Home PPI, no ABX, no VTE due to thrombocytopenia  Mobility 3B    No longer requires ICU care.  Transfer to Medical floor.  Discussed with Hospitalist Team who will assume care.  " Critical care service is available for any questions or concerns.    Interval Problem Update for YESTERDAY:  Reviewed last 24 hour events:  Overnight: hypothermia (95) requiring Francois Hugger. Hypotension 70-80's requiring fluid bolus.  Temp this am 100 - Francois Hugger off  SR 80's  SBP's 's on Levo off. Another 1 L LR bolus  Neuro: oriented to self.  Dex off overnight - back on this morning at 0.1.  Scheduled Narcotics  4L NC 94-97%. Loose productive cough.  WILLIAM Leonard x 3  Rectal Tube   Mobility 1     1:51 PM - patient is oriented and appropriate \"I don't remember the past 2 days\". Patient is tremulous stating \"I'm withdrawing from my pain meds\". Patient tells me she takes her meds as prescribed - 3 (15mg) IR oxycontin and 7.5 mg Percocet PO TID. We discussed not putting her back on all of her pain meds at once. We only have 5 and 10 mg Percocet on formulary here, therefore we will start 10 mg PO Percocet scheduled q8h for now and adjust appropriately. Patient is understanding and agreeable to this plan.    Review of Systems  Review of Systems   Constitutional:  Positive for malaise/fatigue. Negative for chills and fever.   HENT: Negative.     Eyes:  Negative for blurred vision and double vision.   Respiratory:  Negative for cough and shortness of breath.    Cardiovascular:  Negative for chest pain and palpitations.   Gastrointestinal:  Positive for diarrhea. Negative for abdominal pain, nausea and vomiting.   Musculoskeletal:  Positive for back pain and joint pain. Negative for neck pain.   Neurological:  Positive for weakness. Negative for dizziness, focal weakness and headaches.   Psychiatric/Behavioral:  The patient is not nervous/anxious and does not have insomnia.    All other systems reviewed and are negative.     Vital Signs for last 24 hours   Pulse:  [79-99] 94  Resp:  [14-77] 18  BP: ()/(51-68) 96/55  SpO2:  [93 %-97 %] 94 %    Hemodynamic parameters for last 24 hours       Respiratory " Information for the last 24 hours       Physical Exam   Physical Exam  Vitals and nursing note reviewed.   Constitutional:       General: She is awake. She is not in acute distress.     Appearance: Normal appearance. She is ill-appearing. She is not toxic-appearing.   HENT:      Head: Normocephalic and atraumatic.      Nose: Nose normal.      Mouth/Throat:      Lips: Pink.      Mouth: Mucous membranes are moist.   Eyes:      Pupils: Pupils are equal, round, and reactive to light.   Cardiovascular:      Rate and Rhythm: Normal rate and regular rhythm.      Pulses: Normal pulses.      Heart sounds: Normal heart sounds.   Pulmonary:      Effort: Pulmonary effort is normal.      Breath sounds: Normal breath sounds. No wheezing.   Abdominal:      General: Bowel sounds are normal.      Palpations: Abdomen is soft.      Tenderness: There is no abdominal tenderness.   Musculoskeletal:      Right lower leg: No edema.      Left lower leg: No edema.      Comments: AUSTIN 4-5/5 generalized weakness   Skin:     General: Skin is warm and dry.      Capillary Refill: Capillary refill takes less than 2 seconds.   Neurological:      GCS: GCS eye subscore is 4. GCS verbal subscore is 5. GCS motor subscore is 6.      Motor: Motor function is intact.   Psychiatric:         Mood and Affect: Mood normal.         Speech: Speech normal.         Behavior: Behavior normal.         Cognition and Memory: She exhibits impaired recent memory.         Judgment: Judgment normal.       Medications  Current Facility-Administered Medications   Medication Dose Route Frequency Provider Last Rate Last Admin    furosemide (LASIX) tablet 40 mg  40 mg Enteral Tube DAILY JACK Quiroz.P.R.N.        midodrine (PROAMATINE) tablet 10 mg  10 mg Enteral Tube TID JAKC Quiroz.P.R.N.        omeprazole (FIRST-OMEPRAZOLE) 2 mg/mL oral susp 40 mg  40 mg Enteral Tube Q12HRS Rosa Maria Lafleur A.P.R.N.        [Held by provider] lactulose 20 GM/30ML solution 30 mL  30  mL Enteral Tube DAILY Rosa Maria Lafleur A.P.R.N.        oxyCODONE immediate-release (ROXICODONE) tablet 5 mg  5 mg Enteral Tube Q4HRS PRN Rosa Maria Lafleur A.P.R.N.        oxyCODONE-acetaminophen (PERCOCET-10)  MG per tablet 1 Tablet  1 Tablet Enteral Tube Q8HRS Rosa Maria Lafleur A.P.R.N.   1 Tablet at 04/26/23 0527    chlordiazePOXIDE (Librium) capsule 25 mg  25 mg Enteral Tube BID Rosa Maria Lafleur A.P.R.N.   25 mg at 04/26/23 0527    Followed by    [START ON 4/27/2023] chlordiazePOXIDE (Librium) capsule 25 mg  25 mg Enteral Tube DAILY Rosa Maria Lafleur A.P.R.N.        Pharmacy Consult: Enteral tube insertion - review meds/change route/product selection  1 Each Other PHARMACY TO DOSE Rosa Maria Lafleur A.P.R.N.        acetaminophen (Tylenol) tablet 650 mg  650 mg Enteral Tube Q6HRS PRN Rosa Maria Lfaleur, A.P.R.N.   650 mg at 04/25/23 0542    ondansetron (ZOFRAN ODT) dispertab 4 mg  4 mg Enteral Tube Q4HRS PRN Rosa Maria Lafleur A.P.R.N.        promethazine (PHENERGAN) tablet 12.5-25 mg  12.5-25 mg Enteral Tube Q4HRS PRN Rosa Maria Lafleur A.P.R.N.        citalopram (CeleXA) tablet 20 mg  20 mg Enteral Tube DAILY Rosa Maria Lafleur, A.P.R.N.   20 mg at 04/26/23 0527    riFAXIMin (XIFAXAN) tablet 550 mg  550 mg Enteral Tube DAILY Rosa Maria Lafleur, A.P.R.N.   550 mg at 04/26/23 0527    gabapentin (NEURONTIN) capsule 200 mg  200 mg Enteral Tube Q8HRS Rosa Maria Lafleur, A.P.R.N.   200 mg at 04/26/23 0527    labetalol (NORMODYNE/TRANDATE) injection 10 mg  10 mg Intravenous Q4HRS PRN CEDRIC Freedman.D.        Pharmacy Consult Request ...Pain Management Review 1 Each  1 Each Other PHARMACY TO DOSE Abhay Guillory M.D.        ondansetron (ZOFRAN) syringe/vial injection 4 mg  4 mg Intravenous Q4HRS PRN Abhay Guillory M.D.   4 mg at 04/22/23 1428    promethazine (PHENERGAN) suppository 12.5-25 mg  12.5-25 mg Rectal Q4HRS PRN Abhay Guillory M.D.        prochlorperazine (COMPAZINE) injection 5-10 mg  5-10 mg Intravenous Q4HRS PRCHANELL Blank  KELLIE Guillory           Fluids    Intake/Output Summary (Last 24 hours) at 4/26/2023 0800  Last data filed at 4/26/2023 0600  Gross per 24 hour   Intake 6030.95 ml   Output 1510 ml   Net 4520.95 ml       Laboratory          Recent Labs     04/24/23  0028 04/25/23 0411 04/26/23  0523   SODIUM 134* 144 138   POTASSIUM 3.6 4.0 3.9   CHLORIDE 103 114* 109   CO2 20 19* 21   BUN 2* 4* 5*   CREATININE 0.39* 0.32* 0.27*   MAGNESIUM 1.5 2.0 1.9   PHOSPHORUS 1.6* 3.0 2.4*   CALCIUM 8.5 8.0* 7.6*     Recent Labs     04/24/23  0028 04/25/23 0411 04/26/23  0523   ALTSGPT 17  --   --    ASTSGOT 25  --   --    ALKPHOSPHAT 88  --   --    TBILIRUBIN 1.5  --   --    LIPASE 19  --   --    PREALBUMIN  --  3.3*  --    GLUCOSE 124* 114* 108*     Recent Labs     04/24/23  0028 04/25/23 0411 04/26/23  0523   WBC 7.2 3.4* 4.6*   NEUTSPOLYS 81.10* 73.90* 62.80   LYMPHOCYTES 7.90* 10.50* 17.10*   MONOCYTES 9.60 12.90 17.10*   EOSINOPHILS 0.40 1.80 2.40   BASOPHILS 0.30 0.30 0.40   ASTSGOT 25  --   --    ALTSGPT 17  --   --    ALKPHOSPHAT 88  --   --    TBILIRUBIN 1.5  --   --      Recent Labs     04/23/23  1305 04/24/23  0028 04/25/23 0411 04/26/23  0523   RBC  --  3.62* 3.80* 3.30*   HEMOGLOBIN  --  10.1* 10.6* 9.2*   HEMATOCRIT  --  31.4* 33.8* 28.8*   PLATELETCT  --  68* 73* 87*   PROTHROMBTM 20.3*  --   --   --    INR 1.79*  --   --   --        Imaging  No new imaging today.    Assessment/Plan  * Alcohol dependence with withdrawal delirium (HCC)  Assessment & Plan  -Librium taper  -Gabapentin  -high-dose Thiamine  -cessation education and counseling    Chronic, continuous use of opioids  Assessment & Plan  - reviewed - patient received 120 (15 mg) IR oxycontin and 90 (7.5 mg) Norco per month from outside pain management  -holding off on reordering her full OP regime. Will start 5 mg oxycodone q4h to avoid acute withdrawal    Esophageal varices (HCC)- (present on admission)  Assessment & Plan  -s/p banding this  admission    Electrolyte disturbance  Assessment & Plan  -due to ETOH use  -replace as clinically indicated  -follow labs    Acute respiratory failure with hypoxia (HCC)  Assessment & Plan  -due to CAP, sedation for ETOH withdrawal  -titrate FIO2 to keep sats > 92%  -encourage mobility and incentive spirometry    Community acquired pneumonia  Assessment & Plan  -continue ABX  -encourage mobility and incentive spirometry    Pancytopenia (HCC)  Assessment & Plan  -transfused 4/19  -stable  -follow CBC  -monitor for bleeding    Hepatitis  Assessment & Plan  -history of  -chronic and stable at her baseline         VTE:  Contraindicated  Ulcer: PPI  Lines: Leonard Catheter  Ongoing indication addressed    I have performed a physical exam and reviewed and updated ROS and Plan today (4/26/2023). In review of yesterday's note (4/25/2023), there are no changes except as documented above.     Discussed patient condition and risk of morbidity and/or mortality with Hospitalist, RN, Pharmacy, Patient, and my attending Dr. Gonda    Please note that this dictation was created using voice recognition software. I have made every reasonable attempt to correct obvious errors, but there may be errors of grammar and possibly content that I did not discover before finalizing the note.    RAJANI Quiroz.

## 2023-04-26 NOTE — DIETARY
Nutrition Services: Brief Update    Pt is on low fiber diet. Peviously TF was ordered, now discontinued. Pt ate  of breakfast today and has transfer orders per rounds discussion.     RD will monitor PO intake.

## 2023-04-27 ENCOUNTER — PHARMACY VISIT (OUTPATIENT)
Dept: PHARMACY | Facility: MEDICAL CENTER | Age: 38
End: 2023-04-27
Payer: COMMERCIAL

## 2023-04-27 VITALS
HEART RATE: 63 BPM | RESPIRATION RATE: 16 BRPM | HEIGHT: 68 IN | TEMPERATURE: 97.6 F | DIASTOLIC BLOOD PRESSURE: 69 MMHG | OXYGEN SATURATION: 93 % | SYSTOLIC BLOOD PRESSURE: 107 MMHG | WEIGHT: 159.39 LBS | BODY MASS INDEX: 24.16 KG/M2

## 2023-04-27 LAB
ALBUMIN SERPL BCP-MCNC: 3.1 G/DL (ref 3.2–4.9)
ALBUMIN/GLOB SERPL: 1.2 G/DL
ALP SERPL-CCNC: 74 U/L (ref 30–99)
ALT SERPL-CCNC: 16 U/L (ref 2–50)
AMMONIA PLAS-SCNC: 50 UMOL/L (ref 11–45)
ANION GAP SERPL CALC-SCNC: 8 MMOL/L (ref 7–16)
AST SERPL-CCNC: 23 U/L (ref 12–45)
BACTERIA BLD CULT: NORMAL
BACTERIA BLD CULT: NORMAL
BASOPHILS # BLD AUTO: 0.6 % (ref 0–1.8)
BASOPHILS # BLD: 0.03 K/UL (ref 0–0.12)
BILIRUB SERPL-MCNC: 1 MG/DL (ref 0.1–1.5)
BUN SERPL-MCNC: 5 MG/DL (ref 8–22)
CALCIUM ALBUM COR SERPL-MCNC: 8.4 MG/DL (ref 8.5–10.5)
CALCIUM SERPL-MCNC: 7.7 MG/DL (ref 8.5–10.5)
CHLORIDE SERPL-SCNC: 105 MMOL/L (ref 96–112)
CO2 SERPL-SCNC: 24 MMOL/L (ref 20–33)
CREAT SERPL-MCNC: 0.45 MG/DL (ref 0.5–1.4)
EOSINOPHIL # BLD AUTO: 0.12 K/UL (ref 0–0.51)
EOSINOPHIL NFR BLD: 2.4 % (ref 0–6.9)
ERYTHROCYTE [DISTWIDTH] IN BLOOD BY AUTOMATED COUNT: 60.2 FL (ref 35.9–50)
GFR SERPLBLD CREATININE-BSD FMLA CKD-EPI: 126 ML/MIN/1.73 M 2
GLOBULIN SER CALC-MCNC: 2.5 G/DL (ref 1.9–3.5)
GLUCOSE SERPL-MCNC: 105 MG/DL (ref 65–99)
HCT VFR BLD AUTO: 30.3 % (ref 37–47)
HGB BLD-MCNC: 9.6 G/DL (ref 12–16)
IMM GRANULOCYTES # BLD AUTO: 0.04 K/UL (ref 0–0.11)
IMM GRANULOCYTES NFR BLD AUTO: 0.8 % (ref 0–0.9)
LYMPHOCYTES # BLD AUTO: 0.79 K/UL (ref 1–4.8)
LYMPHOCYTES NFR BLD: 16 % (ref 22–41)
MAGNESIUM SERPL-MCNC: 1.9 MG/DL (ref 1.5–2.5)
MCH RBC QN AUTO: 27.1 PG (ref 27–33)
MCHC RBC AUTO-ENTMCNC: 31.7 G/DL (ref 33.6–35)
MCV RBC AUTO: 85.6 FL (ref 81.4–97.8)
MONOCYTES # BLD AUTO: 0.8 K/UL (ref 0–0.85)
MONOCYTES NFR BLD AUTO: 16.2 % (ref 0–13.4)
NEUTROPHILS # BLD AUTO: 3.15 K/UL (ref 2–7.15)
NEUTROPHILS NFR BLD: 64 % (ref 44–72)
NRBC # BLD AUTO: 0 K/UL
NRBC BLD-RTO: 0 /100 WBC
PHOSPHATE SERPL-MCNC: 3.8 MG/DL (ref 2.5–4.5)
PLATELET # BLD AUTO: 97 K/UL (ref 164–446)
PMV BLD AUTO: 9.8 FL (ref 9–12.9)
POTASSIUM SERPL-SCNC: 3.7 MMOL/L (ref 3.6–5.5)
PROT SERPL-MCNC: 5.6 G/DL (ref 6–8.2)
RBC # BLD AUTO: 3.54 M/UL (ref 4.2–5.4)
SIGNIFICANT IND 70042: NORMAL
SIGNIFICANT IND 70042: NORMAL
SITE SITE: NORMAL
SITE SITE: NORMAL
SODIUM SERPL-SCNC: 137 MMOL/L (ref 135–145)
SOURCE SOURCE: NORMAL
SOURCE SOURCE: NORMAL
WBC # BLD AUTO: 4.9 K/UL (ref 4.8–10.8)

## 2023-04-27 PROCEDURE — 700102 HCHG RX REV CODE 250 W/ 637 OVERRIDE(OP): Performed by: HOSPITALIST

## 2023-04-27 PROCEDURE — 85025 COMPLETE CBC W/AUTO DIFF WBC: CPT

## 2023-04-27 PROCEDURE — 83735 ASSAY OF MAGNESIUM: CPT

## 2023-04-27 PROCEDURE — RXMED WILLOW AMBULATORY MEDICATION CHARGE: Performed by: HOSPITALIST

## 2023-04-27 PROCEDURE — 82140 ASSAY OF AMMONIA: CPT

## 2023-04-27 PROCEDURE — A9270 NON-COVERED ITEM OR SERVICE: HCPCS | Performed by: NURSE PRACTITIONER

## 2023-04-27 PROCEDURE — 99239 HOSP IP/OBS DSCHRG MGMT >30: CPT | Performed by: HOSPITALIST

## 2023-04-27 PROCEDURE — 80053 COMPREHEN METABOLIC PANEL: CPT

## 2023-04-27 PROCEDURE — 700102 HCHG RX REV CODE 250 W/ 637 OVERRIDE(OP): Performed by: NURSE PRACTITIONER

## 2023-04-27 PROCEDURE — 84100 ASSAY OF PHOSPHORUS: CPT

## 2023-04-27 PROCEDURE — A9270 NON-COVERED ITEM OR SERVICE: HCPCS | Performed by: HOSPITALIST

## 2023-04-27 RX ORDER — TRAMADOL HYDROCHLORIDE 50 MG/1
50 TABLET ORAL EVERY 8 HOURS PRN
Qty: 30 TABLET | Refills: 0 | Status: SHIPPED | OUTPATIENT
Start: 2023-04-27 | End: 2023-05-07

## 2023-04-27 RX ADMIN — OMEPRAZOLE 20 MG: 20 CAPSULE, DELAYED RELEASE ORAL at 06:10

## 2023-04-27 RX ADMIN — GABAPENTIN 200 MG: 100 CAPSULE ORAL at 06:10

## 2023-04-27 RX ADMIN — RIFAXIMIN 550 MG: 550 TABLET ORAL at 06:10

## 2023-04-27 RX ADMIN — FUROSEMIDE 20 MG: 20 TABLET ORAL at 06:10

## 2023-04-27 RX ADMIN — OXYCODONE 5 MG: 5 TABLET ORAL at 03:45

## 2023-04-27 RX ADMIN — THERA TABS 1 TABLET: TAB at 06:10

## 2023-04-27 RX ADMIN — CITALOPRAM HYDROBROMIDE 20 MG: 20 TABLET ORAL at 06:10

## 2023-04-27 RX ADMIN — OXYCODONE AND ACETAMINOPHEN 1 TABLET: 10; 325 TABLET ORAL at 06:10

## 2023-04-27 ASSESSMENT — PAIN DESCRIPTION - PAIN TYPE
TYPE: ACUTE PAIN

## 2023-04-27 NOTE — PROGRESS NOTES
Hospital Medicine Daily Progress Note    Date of Service  4/26/2023    Chief Complaint  Tonie Tineo is a 37 y.o. female admitted 4/18/2023 with GI bleeding, alcohol abuse    Hospital Course  This is a unfortunate 37-year-old, that is admitted initially with evidence for GI bleeding, history of alcohol abuse, alcoholic liver disease, history of autoimmune hepatitis, esophageal varices, required banding approximately 2 years ago, the patient initially underwent an EGD which revealed esophageal varices with red tamica spots and portal hypertensive gastropathy, 2 bands were placed, patient treated with omeprazole, and the later course the patient developed a fever, she became agitated, diagnosed with bilateral patchy opacities, treated for pneumonia with antibiotics, patient later was diagnosed with alcohol withdrawal, was treated with a CIWA protocol but had then need for escalation to ICU where the patient required a Precedex drip.  The patient apparently had some medications in her possession during the hospitalization that she used in addition to her prescribed medication here.  The patient was treated for left lower lobe pneumonia patient appeared to have some degree of narcotic withdrawal.  Patient required IV fluids  On 4/26 the patient was reportedly improved in ICU and was transferred to the medical unit.      Interval Problem Update  Patient seen and examined today.  Data, Medication data reviewed.  Case discussed with nursing as available.  Plan of Care reviewed with patient and notified of changes.     I have discussed this patient's plan of care and discharge plan at IDT rounds today with Case Management, Nursing, Nursing leadership, and other members of the IDT team.    Consultants/Specialty  GI  Critical care  Code Status  Full Code    Disposition  Patient is not medically cleared for discharge.   Anticipate discharge to to home with close outpatient follow-up.  I have placed the appropriate orders  for post-discharge needs.    Review of Systems  Review of Systems   Constitutional:  Positive for malaise/fatigue. Negative for chills and fever.   HENT: Negative.     Eyes: Negative.    Respiratory: Negative.  Negative for cough.    Cardiovascular: Negative.  Negative for chest pain and palpitations.   Gastrointestinal:  Positive for nausea and vomiting. Negative for heartburn.   Genitourinary: Negative.  Negative for dysuria and frequency.   Musculoskeletal: Negative.  Negative for back pain and neck pain.   Skin: Negative.  Negative for itching and rash.   Neurological: Negative.  Negative for dizziness, focal weakness, weakness and headaches.   Endo/Heme/Allergies: Negative.  Negative for polydipsia. Does not bruise/bleed easily.   Psychiatric/Behavioral:  Positive for substance abuse. Negative for depression. The patient is nervous/anxious.       Physical Exam  Temp:  [37.6 °C (99.7 °F)] 37.6 °C (99.7 °F)  Pulse:  [] 98  Resp:  [14-39] 18  BP: ()/(52-75) 99/75  SpO2:  [92 %-97 %] 94 %    Physical Exam  Vitals and nursing note reviewed.   Constitutional:       Appearance: Normal appearance. She is well-developed. She is not diaphoretic.   HENT:      Head: Normocephalic and atraumatic.      Comments: On 2l NC     Nose: Nose normal.   Eyes:      Conjunctiva/sclera: Conjunctivae normal.      Pupils: Pupils are equal, round, and reactive to light.   Neck:      Thyroid: No thyromegaly.      Vascular: No JVD.   Cardiovascular:      Rate and Rhythm: Normal rate and regular rhythm.      Heart sounds: Normal heart sounds.     No friction rub. No gallop.   Pulmonary:      Effort: Pulmonary effort is normal.      Breath sounds: Rhonchi present. No wheezing or rales.   Abdominal:      General: Bowel sounds are normal. There is no distension.      Palpations: Abdomen is soft. There is no mass.      Tenderness: There is no abdominal tenderness. There is no guarding or rebound.   Musculoskeletal:         General:  No tenderness. Normal range of motion.      Cervical back: Normal range of motion and neck supple.   Lymphadenopathy:      Cervical: No cervical adenopathy.   Skin:     General: Skin is warm and dry.      Coloration: Skin is pale.   Neurological:      Mental Status: She is alert and oriented to person, place, and time.      Cranial Nerves: No cranial nerve deficit.   Psychiatric:         Behavior: Behavior normal.       Fluids    Intake/Output Summary (Last 24 hours) at 4/26/2023 1725  Last data filed at 4/26/2023 1100  Gross per 24 hour   Intake 2330 ml   Output 2430 ml   Net -100 ml         Laboratory  Recent Labs     04/24/23  0028 04/25/23  0411 04/26/23  0523   WBC 7.2 3.4* 4.6*   RBC 3.62* 3.80* 3.30*   HEMOGLOBIN 10.1* 10.6* 9.2*   HEMATOCRIT 31.4* 33.8* 28.8*   MCV 86.7 88.9 87.3   MCH 27.9 27.9 27.9   MCHC 32.2* 31.4* 31.9*   RDW 60.5* 61.9* 61.8*   PLATELETCT 68* 73* 87*   MPV 10.8 10.8 10.8       Recent Labs     04/24/23  0028 04/25/23  0411 04/26/23  0523   SODIUM 134* 144 138   POTASSIUM 3.6 4.0 3.9   CHLORIDE 103 114* 109   CO2 20 19* 21   GLUCOSE 124* 114* 108*   BUN 2* 4* 5*   CREATININE 0.39* 0.32* 0.27*   CALCIUM 8.5 8.0* 7.6*                     Imaging  DX-ABDOMEN FOR TUBE PLACEMENT   Final Result      Cortrak nasogastric tube position consistent with intragastric placement.      CT-CHEST,ABDOMEN,PELVIS WITH   Final Result      1.  Diffuse bilateral ill-defined pulmonary groundglass infiltrates. Consideration should be given for Covid pneumonia as well as other atypical pneumonia or other inflammatory process.      2.  Enlarged mediastinal and hilar lymph nodes.      3.  Cirrhotic appearance of the liver with a small to moderate amount of ascites within the abdomen and pelvis, marked splenomegaly, splenorenal, gastroesophageal, retroperitoneal and mesenteric varices consistent with portal hypertension.      4.  Some inflammatory stranding and fluid adjacent to the pancreas and extending into the  anterior pararenal space. Consideration should be given for acute pancreatitis. There are also punctate pancreatic calcifications consistent with a component of    chronic pancreatitis.      5.  Some thickening of the wall of the duodenum with surrounding stranding attenuation within the retroperitoneum possibly representing duodenitis versus reactive change related to adjacent pancreatitis.      6.  Enlarged retroperitoneal lymph nodes.      DX-CHEST-LIMITED (1 VIEW)   Final Result      Hazy right pulmonary opacities, concerning for a developing pneumonia.      DX-CHEST-LIMITED (1 VIEW)   Final Result      1.  Left base pneumonia.   2.  Right base atelectasis.             Assessment/Plan  * Alcohol dependence with withdrawal delirium (HCC)  Assessment & Plan  Thiamine supplementation  Treatment for alcohol withdrawal, cessation advised    Chronic, continuous use of opioids  Assessment & Plan  Apparently had some degree of narcotic withdrawal while in the hospital, the patient is currently improved, monitor    Agitation requiring sedation protocol- (present on admission)  Assessment & Plan  Secondary to alcohol withdrawal, improved, monitor    Esophageal varices (HCC)- (present on admission)  Assessment & Plan  S/p banding, secondary to cirrhosis, alcohol abuse, will need alcohol cessation and GI follow-up    Portal hypertensive gastropathy (HCC)- (present on admission)  Assessment & Plan  Secondary to cirrhosis, liver disease,    Hypophosphatemia  Assessment & Plan  Replace, monitor    Electrolyte disturbance  Assessment & Plan  Multifactorial, replaced as needed, monitor    Sepsis (HCC)  Assessment & Plan  This is Sepsis Not present on admission  SIRS criteria identified on my evaluation include: Fever, with temperature greater than 101 deg F, Tachycardia, with heart rate greater than 90 BPM and Tachypnea, with respirations greater than 20 per minute  Source is pneumonia   Sepsis protocol initiated  Fluid  resuscitation ordered per protocol  Crystalloid Fluid Administration: Fluid resuscitation ordered per standard protocol - 30 mL/kg per current or ideal body weight  IV antibiotics as appropriate for source of sepsis  Reassessment: I have reassessed the patient's hemodynamic status    Patient with worsening pneumonia on chest x-ray  Persistently febrile  Plan is to get CT chest abdomen pelvis to rule out other source infection  On Zosyn  Follow blood culture          Acute respiratory failure with hypoxia (HCC)  Assessment & Plan  Secondary to left lower lobe pneumonia  Currently improved, on room air, treated    Community acquired pneumonia  Assessment & Plan  Left lower lobe pneumonia on imaging  Successfully treated, monitor    Pancytopenia (HCC)  Assessment & Plan  Likely secondary to liver disease, alcohol abuse, monitor    Hepatitis  Assessment & Plan  Reported history of autoimmune hepatitis  The patient also with alcohol abuse  Will need close GI follow-up and alcohol cessation, medical adherence    Acute blood loss anemia- (present on admission)  Assessment & Plan  Patient presented with acute onset hematemesis, abdominal pain.  Secondary to GI bleed, variceal bleeding, given history of autoimmune hepatitis with variceal bleed 2 years ago requiring banding.  GI consulted  S/p EGD with banding of esophageal varices         Plan  The patient is overall improved, discontinue Librium, monitor closely  Continue some degree of diuresis, decrease dosing  Ongoing supportive care, pain control, Neurontin  Evaluate for ongoing need for midodrine support, wean as tolerated  Continue medication for hepatic encephalopathy prevention  Repeat labs in the morning,  The patient is interested in leaving the hospital soon as possible per her report today  We will follow up in the morning and see how the patient is suitable  See orders  Medically complex high-risk patient    VTE prophylaxis: SCDs/TEDs    I have performed a  physical exam and reviewed and updated ROS and Plan today (4/26/2023). In review of yesterday's note (4/25/2023), there are no changes except as documented above.      Please note that this dictation was created using voice recognition software. I have made every reasonable attempt to correct obvious errors, but I expect that there are errors of grammar and possibly context that I did not discover before finalizing the note.

## 2023-04-27 NOTE — DISCHARGE INSTRUCTIONS
Alcohol Withdrawal Syndrome  When a person who drinks a lot of alcohol stops drinking, he or she may have unpleasant and serious symptoms. These symptoms are called alcohol withdrawal syndrome. This condition may be mild or severe. It can be life-threatening. It can cause:  Shaking that you cannot control (tremor).  Sweating.  Headache.  Feeling fearful, upset, grouchy, or depressed.  Trouble sleeping (insomnia).  Nightmares.  Fast or uneven heartbeats (palpitations).  Alcohol cravings.  Feeling sick to your stomach (nausea).  Throwing up (vomiting).  Being bothered by light and sounds.  Confusion.  Trouble thinking clearly.  Not being hungry (loss of appetite).  Big changes in mood (mood swings).  If you have all of the following symptoms at the same time, get help right away:  High blood pressure.  Fast heartbeat.  Trouble breathing.  Seizures.  Seeing, hearing, feeling, smelling, or tasting things that are not there (hallucinations).  These symptoms are known as delirium tremens (DTs). They must be treated at the hospital right away.  Follow these instructions at home:    Take over-the-counter and prescription medicines only as told by your doctor. This includes vitamins.  Do not drink alcohol.  Do not drive until your doctor says that this is safe for you.  Have someone stay with you or be available in case you need help. This should be someone you trust. This person can help you with your symptoms. He or she can also help you to not drink.  Drink enough fluid to keep your pee (urine) pale yellow.  Think about joining a support group or a treatment program to help you stop drinking.  Keep all follow-up visits as told by your doctor. This is important.  Contact a doctor if:  Your symptoms get worse.  You cannot eat or drink without throwing up.  You have a hard time not drinking alcohol.  You cannot stop drinking alcohol.  Get help right away if:  You have fast or uneven heartbeats.  You have chest pain.  You  have trouble breathing.  You have a seizure for the first time.  You see, hear, feel, smell, or taste something that is not there.  You get very confused.  Summary  When a person who drinks a lot of alcohol stops drinking, he or she may have serious symptoms. This is called alcohol withdrawal syndrome.  Delirium tremens (DTs) is a group of life-threatening symptoms. You should get help right away if you have these symptoms.  Think about joining an alcohol support group or a treatment program.  This information is not intended to replace advice given to you by your health care provider. Make sure you discuss any questions you have with your health care provider.  Document Released: 06/05/2009 Document Revised: 11/30/2018 Document Reviewed: 08/24/2018  [employer] Patient Education © 2020 [employer]  Gastrointestinal Bleeding  Gastrointestinal (GI) bleeding is bleeding somewhere along the path that food travels through the body (digestive tract). This path is anywhere between the mouth and the opening of the butt (anus). You may have blood in your poop (stool) or have black poop. If you throw up (vomit), there may be blood in it.  This condition can be mild, serious, or even life-threatening. If you have a lot of bleeding, you may need to stay in the hospital.  What are the causes?  This condition may be caused by:  Irritation and swelling of the esophagus (esophagitis). The esophagus is part of the body that moves food from your mouth to your stomach.  Swollen veins in the butt (hemorrhoids).  Areas of painful tearing in the opening of the butt (anal fissures). These are often caused by passing hard poop.  Pouches that form on the colon over time (diverticulosis).  Irritation and swelling (diverticulitis) in areas where pouches have formed on the colon.  Growths (polyps) or cancer. Colon cancer often starts out as growths that are not cancer.  Irritation of the stomach lining (gastritis).  Sores (ulcers) in the  stomach.  What increases the risk?  You are more likely to develop this condition if you:  Have a certain type of infection in your stomach (Helicobacter pylori infection).  Take certain medicines.  Smoke.  Drink alcohol.  What are the signs or symptoms?  Common symptoms of this condition include:  Throwing up (vomiting) material that has bright red blood in it. It may look like coffee grounds.  Changes in your poop. The poop may:  Have red blood in it.  Be black, look like tar, and smell stronger than normal.  Be red.  Pain or cramping in the belly (abdomen).  How is this treated?  Treatment for this condition depends on the cause of the bleeding. For example:  Sometimes, the bleeding can be stopped during a procedure that is done to find the problem (endoscopy or colonoscopy).  Medicines can be used to:  Help control irritation, swelling, or infection.  Reduce acid in your stomach.  Certain problems can be treated with:  Creams.  Medicines that are put in the butt (suppositories).  Warm baths.  Surgery is sometimes needed.  If you lose a lot of blood, you may need a blood transfusion.  If bleeding is mild, you may be allowed to go home. If there is a lot of bleeding, you will need to stay in the hospital.  Follow these instructions at home:    Take over-the-counter and prescription medicines only as told by your doctor.  Eat foods that have a lot of fiber in them. These foods include beans, whole grains, and fresh fruits and vegetables. You can also try eating 1-3 prunes each day.  Drink enough fluid to keep your pee (urine) pale yellow.  Keep all follow-up visits as told by your doctor. This is important.  Contact a doctor if:  Your symptoms do not get better.  Get help right away if:  Your bleeding does not stop.  You feel dizzy or you pass out (faint).  You feel weak.  You have very bad cramps in your back or belly.  You pass large clumps of blood (clots) in your poop.  Your symptoms are getting worse.  You  have chest pain or fast heartbeats.  Summary  GI bleeding is bleeding somewhere along the path that food travels through the body (digestive tract).  This bleeding can be caused by many things. Treatment depends on the cause of the bleeding.  Take medicines only as told by your doctor.  Keep all follow-up visits as told by your doctor. This is important.  This information is not intended to replace advice given to you by your health care provider. Make sure you discuss any questions you have with your health care provider.  Document Released: 09/26/2009 Document Revised: 07/31/2019 Document Reviewed: 07/31/2019  [employer] Patient Education © 2020 [employer]

## 2023-04-27 NOTE — PROGRESS NOTES
2 PIVs removed from pts left forearm; tips intact. Both sites covered with gauze and coban. Meds to beds provided. DC instructions discussed, along with followup appointment in [place]. Pt verbalized understanding.

## 2023-04-27 NOTE — CARE PLAN
The patient is Stable - Low risk of patient condition declining or worsening    Shift Goals  Clinical Goals: Pain control  Patient Goals: Pain control, comfort  Family Goals: ANTWAN    Progress made toward(s) clinical / shift goals:      Problem: Pain - Standard  Goal: Alleviation of pain or a reduction in pain to the patient’s comfort goal  Outcome: Progressing  Pt's pain is 6/10 despite pain medications. Pt's pain comfort goal is 4/10.     Problem: Knowledge Deficit - Standard  Goal: Patient and family/care givers will demonstrate understanding of plan of care, disease process/condition, diagnostic tests and medications  Outcome: Progressing       Patient is not progressing towards the following goals:

## 2023-04-27 NOTE — DISCHARGE PLANNING
Case Management Discharge Planning    Admission Date: 4/18/2023  GMLOS: 4.5  ALOS: 8    6-Clicks ADL Score: 24  6-Clicks Mobility Score: 21      Anticipated Discharge Dispo: Discharge Disposition: Discharged to home/self care (01)  Discharge Address: [address] [place] [id number]    DME Needed: No    Action(s) Taken: OTHER Attended multidisciplinary rounds. No needs identified.    Escalations Completed: None    Medically Clear: No    Next Steps: RN CM will continue to follow.    Barriers to Discharge: Medical clearance    Is the patient up for discharge tomorrow: No      37 y.o. female admitted 4/18/2023 with GI bleeding, alcohol abuse. Patient assessed. Independent at home, living with Significant Other and child less than 18 y.o.      No PT/OT recommendations.    Patient will need an IMM prior to discharge.    Mel CLIFTON RN Case Manager  [phone]

## 2023-04-27 NOTE — ASSESSMENT & PLAN NOTE
Apparently had some degree of narcotic withdrawal while in the hospital, the patient is currently improved, monitor

## 2023-04-27 NOTE — PROGRESS NOTES
Assumed care of pt  at 1900. Report received from Day RN. Pt is A&O x 4. Patient stated that their pain is 7/10, pt has been medicated per the MAR. Pt's pain comfort goal is 4/10. Pt educated on how to use the call light, pt verbalized understanding. Bed in lowest locked position, call light within reach, hourly rounding in place. Labs reviewed, orders reviewed, communication board updated. Pt declines any further needs at this time.

## 2023-04-27 NOTE — DISCHARGE SUMMARY
Discharge Summary    CHIEF COMPLAINT ON ADMISSION  Chief Complaint   Patient presents with    GI Problem     Transfer from [facility] for UGIB with history of varices.        Reason for Admission  GI bleed, alcohol abuse    Admission Date  4/18/2023    CODE STATUS  Prior    HPI & HOSPITAL COURSE  This is a unfortunate 37-year-old female, that is admitted initially with evidence for GI bleeding, history of alcohol abuse, alcoholic liver disease, history of autoimmune hepatitis, esophageal varices, required banding approximately 2 years ago, the patient initially underwent an EGD which revealed esophageal varices with red tamica spots and portal hypertensive gastropathy, 2 bands were placed, patient treated with omeprazole, and the later course the patient developed a fever, she became agitated, diagnosed with bilateral patchy opacities, treated for pneumonia with antibiotics, patient later was diagnosed with alcohol withdrawal, was treated with a CIWA protocol but had then need for escalation to ICU where the patient required a Precedex drip.  The patient apparently had some medications in her possession during the hospitalization, that she used in addition to her prescribed medication here, concern for narcotic use while in the hospital.  The patient was treated for left lower lobe pneumonia, improved significantly with multimodality treatment.    On 4/26 the patient was reportedly improved in ICU and was transferred to the medical unit.  The patient has had a fairly comprehensive medical regimen previously prescribed through her gastroenterologist in [place], the patient has been followed there for her autoimmune hepatitis, she lives in [facility], and apparently has not had GI follow-up or primary care coverage to provide her medical care at that location.  The patient is regretful about her alcohol use which she stated was very intermittent and not chronic.  She does not have a  pain management physician.  We discussed her current medication regimen including her chronic narcotic use.  I encouraged for reduction of her narcotics gradually and transition possibly to Ultram if she has ongoing pain concerns.  The patient is strongly encouraged to engage in a outpatient addiction medicine program, alcohol treatment program.  The patient appears open to the suggestions, she is referred to a primary care physician in the [place] area hopefully as well as referral to gastroenterology with [facility] for ongoing follow-up for her autoimmune hepatitis as well as esophageal variceal follow-up EGD.  The patient reports that she has all her medication regimen currently available to her, she does not need refills per her report.  The patient is therefore discharged in a improved medical condition, certainly in need of close outpatient diligent follow-up and further referrals to the above-mentioned entities  Therefore, she is discharged in fair and stable condition to home with close outpatient follow-up.    The patient met 2-midnight criteria for an inpatient stay at the time of discharge.    Discharge Date  4/27/2023    FOLLOW UP ITEMS POST DISCHARGE  Gastroenterology follow-up for repeat EGD after esophageal variceal banding  Gastroenterology/hepatology follow-up for ongoing treatment of her autoimmune hepatitis  Outpatient addiction medicine referral, alcohol treatment program referral    DISCHARGE DIAGNOSES  Principal Problem:    Alcohol dependence with withdrawal delirium (HCC) POA: Unknown  Active Problems:    Acute blood loss anemia POA: Yes    Hepatitis (Chronic) POA: Unknown    Pancytopenia (HCC) POA: Unknown    Community acquired pneumonia POA: Unknown    Acute respiratory failure with hypoxia (HCC) POA: Unknown    Sepsis (HCC) POA: Unknown    Electrolyte disturbance POA: Unknown    Hypophosphatemia POA: Unknown    Portal hypertensive gastropathy (HCC) POA: Yes    Esophageal varices (HCC) POA:  Yes    Agitation requiring sedation protocol POA: Yes    Chronic, continuous use of opioids (Chronic) POA: Unknown  Resolved Problems:    * No resolved hospital problems. *      FOLLOW UP  Future Appointments   Date Time Provider Department Center   5/16/2023  9:20 AM ALEJANDRO Denton [facility] None     ALEJANDRO Denton  [address] [place] [address]  [phone]    Go on 5/16/2023  Go to your appointment with ALEJANDRO Denton on Tuesday May 16th 2023 at 9:20am. You will need to go to [email or web address] to fill out new patient paperwork.      MEDICATIONS ON DISCHARGE     Medication List        START taking these medications        Instructions   traMADol 50 MG Tabs  Commonly known as: Ultram   Take 1 Tablet by mouth every 8 hours as needed for Moderate Pain or Severe Pain for up to 10 days.  Dose: 50 mg            CONTINUE taking these medications        Instructions   alprazolam 2 MG tablet  Commonly known as: XANAX   Take 1 mg by mouth at bedtime as needed for Sleep.  Dose: 1 mg     citalopram 20 MG Tabs  Commonly known as: CeleXA   Take 20 mg by mouth every day.  Dose: 20 mg     furosemide 40 MG Tabs  Commonly known as: LASIX   Take 40 mg by mouth every day.  Dose: 40 mg     Iron 325 (65 Fe) MG Tabs   Take  by mouth.     lactulose 10 GM/15ML Soln   Take 20 g by mouth two times a week.  Dose: 20 g     midodrine 10 MG tablet  Commonly known as: PROAMATINE   Take 10 mg by mouth 3 times a day as needed. Indications: Disorder of Low Blood Pressure  Dose: 10 mg     nadolol 20 MG Tabs  Commonly known as: CORGARD   Take 20 mg by mouth every day.  Dose: 20 mg     ondansetron 4 MG Tbdp  Commonly known as: ZOFRAN ODT   Take 1 Tablet by mouth every four hours as needed for Nausea/Vomiting.  Dose: 4 mg     pantoprazole 40 MG Tbec  Commonly known as: PROTONIX   Take 40 mg by mouth 2 times a day.  Dose: 40 mg     riFAXIMin 550 MG Tabs tablet  Commonly known as: XIFAXAN   Take 550 mg by mouth every day.  Dose:  550 mg     spironolactone 50 MG Tabs  Commonly known as: ALDACTONE   Take 50 mg by mouth 2 times a day.  Dose: 50 mg            STOP taking these medications      benzonatate 100 MG Caps  Commonly known as: TESSALON     HYDROcodone-acetaminophen 7.5-325 MG tab  Commonly known as: NORCO     oxyCODONE immediate release 10 MG immediate release tablet  Commonly known as: ROXICODONE              Allergies  Allergies   Allergen Reactions    Levofloxacin Anaphylaxis       DIET  Hepatic diet, careful with hydration  Absolute alcohol abstinence      ACTIVITY  As tolerated.  Weight bearing as tolerated    CONSULTATIONS  Critical care, gastroenterology    PROCEDURES  OPERATIVE REPORT     PATIENT:   Tonie Tineo   1985         PREOPERATIVE DIAGNOSES/INDICATIONS: hematemesis     POSTOPERATIVE DIAGNOSIS: esophageal varices, portal hypertensive gastropathy     PROCEDURE:  ESOPHAGOGASTRODUODENOSCOPY with banding     PHYSICIAN:  Nae Carr MD     ANESTHESIA:  Per anesthesiologist.     LOCATION: [facility]     CONSENT:  OBTAINED. The risks, benefits and alternatives of the procedure were discussed in details. The risks include and are not limited to bleeding, infection, perforation, missed lesions, and sedations risks (cardiopulmonary compromise and allergic reaction to medications).     DESCRIPTION: The patient presented to the procedure room.  After routine checkup was performed, patient was brought into the endoscopy suite.  Patient was placed on his left lateral decubitus position. A bite block was placed in patient's mouth. Patient was sedated by anesthesia.  Vital signs were monitored throughout the procedure.  Oxygenation support was provided throughout the procedure. Upper endoscope was inserted into patient's mouth and advanced to the second portion of the duodenum under direct visualization.       Once the site was reached and examined, the upper endoscope was withdrawn.  Retroflexion was made within the stomach.   The stomach was decompressed, scope was withdrawn and the procedure was terminated.     The patient tolerated the procedure well.  There were no immediate complications.     OPERATIVE FINDINGS:     1. Esophagus: distal varices only with red tamica spots. Two columns(very short) and two bands placed. Varices flattened.  2. Stomach: portal hypertensive gastropathy. No bleeding. No gastric varices.   3. Duodenum: normal     IMPRESSION/RECOMMENDATIONS:  Esophageal varices  Portal hypertensive gastropathy     Follow for bleeding, H/H and melena or hematemesis  Transfuse if needed.   Patient needs treatment for autoimmune hepatitis and follow up with hepatology  Continue octreotide     This note has been transcribed with digital voice recognition software and although it has been reviewed may contain grammatical or word errors             LABORATORY  Lab Results   Component Value Date    SODIUM 137 04/27/2023    POTASSIUM 3.7 04/27/2023    CHLORIDE 105 04/27/2023    CO2 24 04/27/2023    GLUCOSE 105 (H) 04/27/2023    BUN 5 (L) 04/27/2023    CREATININE 0.45 (L) 04/27/2023        Lab Results   Component Value Date    WBC 4.9 04/27/2023    HEMOGLOBIN 9.6 (L) 04/27/2023    HEMATOCRIT 30.3 (L) 04/27/2023    PLATELETCT 97 (L) 04/27/2023      The patient strictly instructed to refrain from further alcohol use, she is strictly instructed to reduce her narcotic intake and attempt to replace with Ultram is much as possible  The patient is instructed to follow-up closely with her primary care physician, gastroenterology as possible for outpatient follow-up  To take the medication as currently prescribed  Monitor blood pressure at home and director midodrine use according to her blood pressure readings and tolerance of diuretics  The patient voices understanding and is being discharged in medically improved condition.    Total time of the discharge process to the extent of 65 minutes.

## 2023-05-04 ENCOUNTER — TELEPHONE (OUTPATIENT)
Dept: HEALTH INFORMATION MANAGEMENT | Facility: OTHER | Age: 38
End: 2023-05-04
Payer: MEDICARE

## 2023-09-18 ENCOUNTER — APPOINTMENT (OUTPATIENT)
Dept: RADIOLOGY | Facility: MEDICAL CENTER | Age: 38
End: 2023-09-18
Attending: EMERGENCY MEDICINE
Payer: MEDICARE

## 2023-09-18 ENCOUNTER — HOSPITAL ENCOUNTER (EMERGENCY)
Facility: MEDICAL CENTER | Age: 38
End: 2023-09-19
Attending: EMERGENCY MEDICINE
Payer: MEDICARE

## 2023-09-18 DIAGNOSIS — R10.30 LOWER ABDOMINAL PAIN: ICD-10-CM

## 2023-09-18 DIAGNOSIS — R14.1 ABDOMINAL GAS PAIN: ICD-10-CM

## 2023-09-18 DIAGNOSIS — K59.00 CONSTIPATION, UNSPECIFIED CONSTIPATION TYPE: ICD-10-CM

## 2023-09-18 LAB
BASOPHILS # BLD AUTO: 0.5 % (ref 0–1.8)
BASOPHILS # BLD: 0.02 K/UL (ref 0–0.12)
EOSINOPHIL # BLD AUTO: 0.51 K/UL (ref 0–0.51)
EOSINOPHIL NFR BLD: 14 % (ref 0–6.9)
ERYTHROCYTE [DISTWIDTH] IN BLOOD BY AUTOMATED COUNT: 46.8 FL (ref 35.9–50)
HCG SERPL QL: NEGATIVE
HCT VFR BLD AUTO: 29.3 % (ref 37–47)
HGB BLD-MCNC: 9.4 G/DL (ref 12–16)
IMM GRANULOCYTES # BLD AUTO: 0.02 K/UL (ref 0–0.11)
IMM GRANULOCYTES NFR BLD AUTO: 0.5 % (ref 0–0.9)
LIPASE SERPL-CCNC: 39 U/L (ref 11–82)
LYMPHOCYTES # BLD AUTO: 0.6 K/UL (ref 1–4.8)
LYMPHOCYTES NFR BLD: 16.4 % (ref 22–41)
MCH RBC QN AUTO: 28 PG (ref 27–33)
MCHC RBC AUTO-ENTMCNC: 32.1 G/DL (ref 32.2–35.5)
MCV RBC AUTO: 87.2 FL (ref 81.4–97.8)
MONOCYTES # BLD AUTO: 0.45 K/UL (ref 0–0.85)
MONOCYTES NFR BLD AUTO: 12.3 % (ref 0–13.4)
NEUTROPHILS # BLD AUTO: 2.05 K/UL (ref 1.82–7.42)
NEUTROPHILS NFR BLD: 56.3 % (ref 44–72)
NRBC # BLD AUTO: 0 K/UL
NRBC BLD-RTO: 0 /100 WBC (ref 0–0.2)
PLATELET # BLD AUTO: 60 K/UL (ref 164–446)
PLATELETS.RETICULATED NFR BLD AUTO: 3.9 % (ref 0.6–13.1)
PMV BLD AUTO: 10.2 FL (ref 9–12.9)
RBC # BLD AUTO: 3.36 M/UL (ref 4.2–5.4)
WBC # BLD AUTO: 3.7 K/UL (ref 4.8–10.8)

## 2023-09-18 PROCEDURE — 74177 CT ABD & PELVIS W/CONTRAST: CPT

## 2023-09-18 PROCEDURE — 99285 EMERGENCY DEPT VISIT HI MDM: CPT

## 2023-09-18 PROCEDURE — 700111 HCHG RX REV CODE 636 W/ 250 OVERRIDE (IP): Mod: UD | Performed by: EMERGENCY MEDICINE

## 2023-09-18 PROCEDURE — 700102 HCHG RX REV CODE 250 W/ 637 OVERRIDE(OP): Mod: UD | Performed by: EMERGENCY MEDICINE

## 2023-09-18 PROCEDURE — 85025 COMPLETE CBC W/AUTO DIFF WBC: CPT

## 2023-09-18 PROCEDURE — 85055 RETICULATED PLATELET ASSAY: CPT

## 2023-09-18 PROCEDURE — 700105 HCHG RX REV CODE 258: Mod: UD | Performed by: EMERGENCY MEDICINE

## 2023-09-18 PROCEDURE — 36415 COLL VENOUS BLD VENIPUNCTURE: CPT

## 2023-09-18 PROCEDURE — A9270 NON-COVERED ITEM OR SERVICE: HCPCS | Mod: UD | Performed by: EMERGENCY MEDICINE

## 2023-09-18 PROCEDURE — 700117 HCHG RX CONTRAST REV CODE 255: Mod: UD | Performed by: EMERGENCY MEDICINE

## 2023-09-18 PROCEDURE — 96374 THER/PROPH/DIAG INJ IV PUSH: CPT | Mod: XU

## 2023-09-18 PROCEDURE — 84703 CHORIONIC GONADOTROPIN ASSAY: CPT

## 2023-09-18 PROCEDURE — 83690 ASSAY OF LIPASE: CPT

## 2023-09-18 RX ORDER — SODIUM CHLORIDE 9 MG/ML
INJECTION, SOLUTION INTRAVENOUS CONTINUOUS
Status: DISCONTINUED | OUTPATIENT
Start: 2023-09-18 | End: 2023-09-19 | Stop reason: HOSPADM

## 2023-09-18 RX ORDER — SIMETHICONE 125 MG
125 TABLET,CHEWABLE ORAL ONCE
Status: COMPLETED | OUTPATIENT
Start: 2023-09-18 | End: 2023-09-18

## 2023-09-18 RX ORDER — KETOROLAC TROMETHAMINE 30 MG/ML
15 INJECTION, SOLUTION INTRAMUSCULAR; INTRAVENOUS ONCE
Status: COMPLETED | OUTPATIENT
Start: 2023-09-18 | End: 2023-09-18

## 2023-09-18 RX ADMIN — SIMETHICONE 125 MG: 125 TABLET, CHEWABLE ORAL at 21:58

## 2023-09-18 RX ADMIN — IOHEXOL 100 ML: 350 INJECTION, SOLUTION INTRAVENOUS at 22:25

## 2023-09-18 RX ADMIN — SODIUM CHLORIDE: 9 INJECTION, SOLUTION INTRAVENOUS at 21:41

## 2023-09-18 RX ADMIN — KETOROLAC TROMETHAMINE 15 MG: 30 INJECTION, SOLUTION INTRAMUSCULAR; INTRAVENOUS at 21:56

## 2023-09-18 ASSESSMENT — PAIN DESCRIPTION - PAIN TYPE: TYPE: ACUTE PAIN

## 2023-09-18 ASSESSMENT — FIBROSIS 4 INDEX: FIB4 SCORE: 4.06

## 2023-09-19 VITALS
HEIGHT: 68 IN | WEIGHT: 159.39 LBS | HEART RATE: 86 BPM | RESPIRATION RATE: 16 BRPM | BODY MASS INDEX: 24.16 KG/M2 | OXYGEN SATURATION: 99 % | DIASTOLIC BLOOD PRESSURE: 60 MMHG | SYSTOLIC BLOOD PRESSURE: 99 MMHG | TEMPERATURE: 97.9 F

## 2023-09-19 NOTE — ED NOTES
Pt resting in [facility]. Reports pain now a 4/10, pt denies needs at this time and no acute distress.

## 2023-09-19 NOTE — ED TRIAGE NOTES
Chief Complaint   Patient presents with    Abdominal Pain     LLQ abdominal pain that started today     Pt BIBA for above complaint.  Pt has not had a menstrual period for 3 months.  Pain started in LLQ and now spread to entire lower abdomen.  History of Hep C, with therapy stopped 3 months ago.  PTA pt received tylenol, 4mg zofran. Pt denies chest pain and SOB.

## 2023-09-19 NOTE — ED NOTES
Pt provided phone to call facility. Pt attempted but unable to connect. Pt ambulated to and from restroom with steady gait.

## 2023-09-19 NOTE — ED PROVIDER NOTES
ER Provider Note    Scribed for Dr. Kiah Velez D.O. by Lucille Simms. 9/18/2023  9:04 PM    Primary Care Provider: None noted    CHIEF COMPLAINT  Chief Complaint   Patient presents with    Abdominal Pain     LLQ abdominal pain that started today       EXTERNAL RECORDS REVIEWED  Outpatient Notes The patient was last here 8/23/23 from snf for a psychiatric evaluation.    HPI/ROS  LIMITATION TO HISTORY   Select: : None    OUTSIDE HISTORIAN(S):  None noted    Tonie Dillon is a 38 y.o. female who presents to the ED for left lower quadrant abdominal pain starting tonight while cleaning kitchen. She reports she was bent over because her pain was so severe. She reports having two bowel movements today. She denies any burning with urination and dysuria. Per RN, the patient has not had a menstrual period for 3 months. The patient has a history of Hepatitis C. She denies any history of diverticulitis.     PAST MEDICAL HISTORY  Past Medical History:   Diagnosis Date    Autoimmune hepatitis (HCC)     Bleeding esophageal varices due to autoimmune hepatitis (HCC)     Patient denies medical problems        SURGICAL HISTORY  Past Surgical History:   Procedure Laterality Date    TN UPPER GI ENDOSCOPY,DIAGNOSIS N/A 4/19/2023    Procedure: GASTROSCOPY;  Surgeon: Nae Carr M.D.;  Location: SURGERY SAME DAY [facility];  Service: Gastroenterology    TN UPPER GI ENDOSCOPY,LIGAT VARIX  4/19/2023    Procedure: GASTROSCOPY, WITH VARICEAL BANDING;  Surgeon: Nae Carr M.D.;  Location: SURGERY SAME DAY [facility];  Service: Gastroenterology       FAMILY HISTORY  None noted    SOCIAL HISTORY   reports that she has quit smoking. Her smoking use included cigarettes. She has never used smokeless tobacco. She reports current alcohol use. She reports current drug use.    CURRENT MEDICATIONS  Discharge Medication List as of 9/19/2023 12:08 AM        CONTINUE these medications which have NOT CHANGED    Details   lactulose 10 GM/15ML  "Solution Take 20 g by mouth two times a week., Historical Med      riFAXIMin (XIFAXAN) 550 MG Tab tablet Take 550 mg by mouth every day., Historical Med      ondansetron (ZOFRAN ODT) 4 MG TABLET DISPERSIBLE Take 1 Tablet by mouth every four hours as needed for Nausea/Vomiting., Disp-12 Tablet, R-0, Normal      nadolol (CORGARD) 20 MG Tab Take 20 mg by mouth every day., Historical Med      spironolactone (ALDACTONE) 50 MG Tab Take 50 mg by mouth 2 times a day., Historical Med      pantoprazole (PROTONIX) 40 MG Tablet Delayed Response Take 40 mg by mouth 2 times a day., Historical Med      citalopram (CELEXA) 20 MG Tab Take 20 mg by mouth every day., Historical Med      furosemide (LASIX) 40 MG Tab Take 40 mg by mouth every day., Historical Med      alprazolam (XANAX) 2 MG tablet Take 1 mg by mouth at bedtime as needed for Sleep., Historical Med      midodrine (PROAMATINE) 10 MG tablet Take 10 mg by mouth 3 times a day as needed. Indications: Disorder of Low Blood Pressure, Historical Med      Ferrous Sulfate (IRON) 325 (65 Fe) MG Tab Take  by mouth., Historical Med             ALLERGIES  Levofloxacin    PHYSICAL EXAM  /60   Pulse 83   Temp 36.7 °C (98 °F) (Temporal)   Resp 16   Ht 1.727 m (5' 7.99\")   Wt 72.3 kg (159 lb 6.3 oz)   SpO2 99%   BMI 24.24 kg/m²   Constitutional: Patient is well developed, well nourished. Non-toxic appearing. Moderate distress.   HENT: Normocephalic, atraumatic.  External mucosa.  Cardiovascular: Normal heart rate and Regular rhythm. No murmur.  Thorax & Lungs: Clear and equal breath sounds with good excursion. No respiratory distress, no rhonchi, wheezing or rales.  Abdomen: diffuse tenderness worse in the lower abdomen with hyperactive bowel sounds, Intentional guarding no rebound, palpable masses.   Skin: Warm, Dry, No erythema, No rashes.    Extremities: Peripheral pulses 4/4 No edema, No tenderness.   Neurologic: Alert & oriented x 3, Normal motor function, Normal sensory " function  Psychiatric: Affect anxious    DIAGNOSTIC STUDIES & PROCEDURES    Labs:   Results for orders placed or performed during the hospital encounter of 09/18/23   CBC WITH DIFFERENTIAL   Result Value Ref Range    WBC 3.7 (L) 4.8 - 10.8 K/uL    RBC 3.36 (L) 4.20 - 5.40 M/uL    Hemoglobin 9.4 (L) 12.0 - 16.0 g/dL    Hematocrit 29.3 (L) 37.0 - 47.0 %    MCV 87.2 81.4 - 97.8 fL    MCH 28.0 27.0 - 33.0 pg    MCHC 32.1 (L) 32.2 - 35.5 g/dL    RDW 46.8 35.9 - 50.0 fL    Platelet Count 60 (L) 164 - 446 K/uL    MPV 10.2 9.0 - 12.9 fL    Neutrophils-Polys 56.30 44.00 - 72.00 %    Lymphocytes 16.40 (L) 22.00 - 41.00 %    Monocytes 12.30 0.00 - 13.40 %    Eosinophils 14.00 (H) 0.00 - 6.90 %    Basophils 0.50 0.00 - 1.80 %    Immature Granulocytes 0.50 0.00 - 0.90 %    Nucleated RBC 0.00 0.00 - 0.20 /100 WBC    Neutrophils (Absolute) 2.05 1.82 - 7.42 K/uL    Lymphs (Absolute) 0.60 (L) 1.00 - 4.80 K/uL    Monos (Absolute) 0.45 0.00 - 0.85 K/uL    Eos (Absolute) 0.51 0.00 - 0.51 K/uL    Baso (Absolute) 0.02 0.00 - 0.12 K/uL    Immature Granulocytes (abs) 0.02 0.00 - 0.11 K/uL    NRBC (Absolute) 0.00 K/uL   LIPASE   Result Value Ref Range    Lipase 39 11 - 82 U/L   HCG QUAL SERUM   Result Value Ref Range    Beta-Hcg Qualitative Serum Negative Negative   IMMATURE PLT FRACTION   Result Value Ref Range    Imm. Plt Fraction 3.9 0.6 - 13.1 %     All labs reviewed by me.    Radiology:   The attending Emergency Physician has independently interpreted the diagnostic imaging associated with this visit and is awaiting the final reading from the radiologist, which will be displayed below.    Preliminary interpretation is a follows: No free air or obstruction.  Radiologist interpretation:    CT-ABDOMEN-PELVIS WITH   Final Result         1.  There are morphologic changes of the liver consistent with cirrhosis.   2.  There is splenomegaly with changes of portal hypertension including numerous varices and collateral vessels and cavernous  transformation of the main portal vein.   3.  Bowel wall thickening of the descending duodenum with fat stranding in the upper abdomen suggesting possible duodenitis.   4.  Minimal fat stranding on the pancreas possibly representing acute pancreatitis with a few calcifications consistent with chronic pancreatitis.            COURSE & MEDICAL DECISION MAKING    ED Observation Status? Yes; I am placing the patient in to an observation status due to a diagnostic uncertainty as well as therapeutic intensity. Patient placed in observation status at 9:12 PM, 9/18/2023.     Observation plan is as follows: Pain control, laboratories, IV fluids, CT abdomen and pelvis    Upon Reevaluation, the patient's condition has: Improved; and will be discharged.    Patient discharged from ED Observation status at 2352 (Time) 9/18/23 (Date).     INITIAL ASSESSMENT AND PLAN  Care Narrative:       9:04 PM - Patient seen and evaluated at bedside for abdominal pain. Discussed plan of care, including labs and imaging. Patient agrees to plan of care. Ordered CT-Abdomen-Pelvis with, HCG Qual Serum, UA, CBC with differential, Lipase and Immature PLT Fraction to evaluate. Differential diagnoses include but are not limited to: Constipation, bowel obstruction, bowel perforation    9:11 PM - The patient will be treated with Toradol 15 mg and Mylicon 125 mg.   CT shows no acute abdominal pathology.  Her laboratories revealed a normal white blood cell count, mildly low hemoglobin 9.4, hematocrit 29.3, increased eosinophils.  Patient does exhibit signs of cirrhosis and splenomegaly.  Upon recheck after she was given simethicone was markedly improved.  Her pain was completely resolved.  Plan will be to discharge her home to follow-up with her primary care provider outpatient, increase water and fiber in her diet, simethicone as needed and return if problems.    HYDRATION: Based on the patient's presentation of Dehydration and Hypotension the patient was  given IV fluids. IV Hydration was used because oral hydration was not adequate alone. Upon recheck following hydration, the patient was improved.    ADDITIONAL PROBLEM LIST AND DISPOSITION  Cirrhosis, esophageal varices               DISPOSITION AND DISCUSSIONS  I have discussed management of the patient with the following physicians and AMADEO's: None    Discussion of management with other QHP or appropriate source(s): None     Escalation of care considered, and ultimately not performed: IV fluids, blood analysis, and diagnostic imaging.    Barriers to care at this time, including but not limited to:  None .     Decision tools and prescription drugs considered including, but not limited to: Pain Medications Toradol and Zofran, simethicone .        FINAL IMPRESSION   1. Lower abdominal pain    2. Constipation, unspecified constipation type    3. Abdominal gas pain         Lucille KIRKLAND (Scribe), am scribing for, and in the presence of, Kiah Velez D.O..    Electronically signed by: Lucille Simms (Marlynibe), 9/18/2023    IKiah D.O. personally performed the services described in this documentation, as scribed by Lucille Simms in my presence, and it is both accurate and complete.    The note accurately reflects work and decisions made by me.  Kiah Velez D.O.  9/19/2023  2:17 AM

## 2023-09-19 NOTE — DISCHARGE INSTRUCTIONS
Make sure that you are drinking plenty of water, following a high-fiber diet.  If you have sharp stabbing abdominal pains take simethicone/Mylicon/Gas-X which will help with the pain.  You can take that up to 3 times a day.  Follow-up with your primary care provider within 1 week for recheck and return if problems.

## 2023-09-19 NOTE — ED NOTES
"Pt discharged to home with steady gait.  Pt alert and oriented times 4 on room air.  IV discontinued and gauze placed, pt in possession of belongings.  Pt provided discharge education and information pertaining to medications and follow up appointments.  Pt received copy of discharge instructions and verbalized understanding. Pt also received MTM paperwork with facesheet for Medicaid ID. Encouraged to follow up with PCP. BP 99/60   Pulse 86   Temp 36.6 °C (97.9 °F) (Temporal)   Resp 16   Ht 1.727 m (5' 7.99\")   Wt 72.3 kg (159 lb 6.3 oz)   SpO2 99%   BMI 24.24 kg/m²      "

## 2023-10-14 ENCOUNTER — APPOINTMENT (OUTPATIENT)
Dept: RADIOLOGY | Facility: MEDICAL CENTER | Age: 38
End: 2023-10-14
Attending: EMERGENCY MEDICINE
Payer: MEDICARE

## 2023-10-14 ENCOUNTER — HOSPITAL ENCOUNTER (OUTPATIENT)
Facility: MEDICAL CENTER | Age: 38
End: 2023-10-17
Attending: EMERGENCY MEDICINE | Admitting: STUDENT IN AN ORGANIZED HEALTH CARE EDUCATION/TRAINING PROGRAM
Payer: MEDICARE

## 2023-10-14 DIAGNOSIS — K92.0 HEMATEMESIS WITH NAUSEA: ICD-10-CM

## 2023-10-14 DIAGNOSIS — K75.4 AUTOIMMUNE HEPATITIS (HCC): ICD-10-CM

## 2023-10-14 DIAGNOSIS — Z87.19 HISTORY OF ESOPHAGEAL VARICES: ICD-10-CM

## 2023-10-14 DIAGNOSIS — K25.4 GASTROINTESTINAL HEMORRHAGE ASSOCIATED WITH GASTRIC ULCER: ICD-10-CM

## 2023-10-14 DIAGNOSIS — K92.2 UPPER GI BLEED: ICD-10-CM

## 2023-10-14 LAB
ALBUMIN SERPL BCP-MCNC: 4.1 G/DL (ref 3.2–4.9)
ALBUMIN/GLOB SERPL: 1.6 G/DL
ALP SERPL-CCNC: 83 U/L (ref 30–99)
ALT SERPL-CCNC: 33 U/L (ref 2–50)
ANION GAP SERPL CALC-SCNC: 10 MMOL/L (ref 7–16)
APPEARANCE UR: CLEAR
AST SERPL-CCNC: 29 U/L (ref 12–45)
BACTERIA #/AREA URNS HPF: ABNORMAL /HPF
BASOPHILS # BLD AUTO: 0.5 % (ref 0–1.8)
BASOPHILS # BLD: 0.02 K/UL (ref 0–0.12)
BILIRUB SERPL-MCNC: 0.7 MG/DL (ref 0.1–1.5)
BILIRUB UR QL STRIP.AUTO: NEGATIVE
BUN SERPL-MCNC: 9 MG/DL (ref 8–22)
CALCIUM ALBUM COR SERPL-MCNC: 9 MG/DL (ref 8.5–10.5)
CALCIUM SERPL-MCNC: 9.1 MG/DL (ref 8.5–10.5)
CHLORIDE SERPL-SCNC: 109 MMOL/L (ref 96–112)
CO2 SERPL-SCNC: 20 MMOL/L (ref 20–33)
COLOR UR: YELLOW
CREAT SERPL-MCNC: 0.39 MG/DL (ref 0.5–1.4)
EKG IMPRESSION: NORMAL
EOSINOPHIL # BLD AUTO: 0.19 K/UL (ref 0–0.51)
EOSINOPHIL NFR BLD: 5.1 % (ref 0–6.9)
EPI CELLS #/AREA URNS HPF: NEGATIVE /HPF
ERYTHROCYTE [DISTWIDTH] IN BLOOD BY AUTOMATED COUNT: 43.8 FL (ref 35.9–50)
GFR SERPLBLD CREATININE-BSD FMLA CKD-EPI: 130 ML/MIN/1.73 M 2
GLOBULIN SER CALC-MCNC: 2.5 G/DL (ref 1.9–3.5)
GLUCOSE SERPL-MCNC: 122 MG/DL (ref 65–99)
GLUCOSE UR STRIP.AUTO-MCNC: NEGATIVE MG/DL
HCG SERPL QL: NEGATIVE
HCT VFR BLD AUTO: 30.8 % (ref 37–47)
HGB BLD-MCNC: 9.7 G/DL (ref 12–16)
HYALINE CASTS #/AREA URNS LPF: ABNORMAL /LPF
IMM GRANULOCYTES # BLD AUTO: 0.01 K/UL (ref 0–0.11)
IMM GRANULOCYTES NFR BLD AUTO: 0.3 % (ref 0–0.9)
KETONES UR STRIP.AUTO-MCNC: NEGATIVE MG/DL
LEUKOCYTE ESTERASE UR QL STRIP.AUTO: ABNORMAL
LIPASE SERPL-CCNC: 30 U/L (ref 11–82)
LYMPHOCYTES # BLD AUTO: 0.5 K/UL (ref 1–4.8)
LYMPHOCYTES NFR BLD: 13.6 % (ref 22–41)
MCH RBC QN AUTO: 26.6 PG (ref 27–33)
MCHC RBC AUTO-ENTMCNC: 31.5 G/DL (ref 32.2–35.5)
MCV RBC AUTO: 84.6 FL (ref 81.4–97.8)
MICRO URNS: ABNORMAL
MONOCYTES # BLD AUTO: 0.37 K/UL (ref 0–0.85)
MONOCYTES NFR BLD AUTO: 10 % (ref 0–13.4)
NEUTROPHILS # BLD AUTO: 2.6 K/UL (ref 1.82–7.42)
NEUTROPHILS NFR BLD: 70.5 % (ref 44–72)
NITRITE UR QL STRIP.AUTO: POSITIVE
NRBC # BLD AUTO: 0 K/UL
NRBC BLD-RTO: 0 /100 WBC (ref 0–0.2)
PH UR STRIP.AUTO: 6.5 [PH] (ref 5–8)
PLATELET # BLD AUTO: 57 K/UL (ref 164–446)
PLATELETS.RETICULATED NFR BLD AUTO: 3.4 % (ref 0.6–13.1)
PMV BLD AUTO: 11 FL (ref 9–12.9)
POTASSIUM SERPL-SCNC: 3.7 MMOL/L (ref 3.6–5.5)
PROT SERPL-MCNC: 6.6 G/DL (ref 6–8.2)
PROT UR QL STRIP: NEGATIVE MG/DL
RBC # BLD AUTO: 3.64 M/UL (ref 4.2–5.4)
RBC # URNS HPF: ABNORMAL /HPF
RBC UR QL AUTO: NEGATIVE
SODIUM SERPL-SCNC: 139 MMOL/L (ref 135–145)
SP GR UR STRIP.AUTO: 1.02
TROPONIN T SERPL-MCNC: <6 NG/L (ref 6–19)
UROBILINOGEN UR STRIP.AUTO-MCNC: 1 MG/DL
WBC # BLD AUTO: 3.7 K/UL (ref 4.8–10.8)
WBC #/AREA URNS HPF: ABNORMAL /HPF

## 2023-10-14 PROCEDURE — 85055 RETICULATED PLATELET ASSAY: CPT

## 2023-10-14 PROCEDURE — G0378 HOSPITAL OBSERVATION PER HR: HCPCS

## 2023-10-14 PROCEDURE — 85025 COMPLETE CBC W/AUTO DIFF WBC: CPT

## 2023-10-14 PROCEDURE — 99223 1ST HOSP IP/OBS HIGH 75: CPT | Mod: GC | Performed by: STUDENT IN AN ORGANIZED HEALTH CARE EDUCATION/TRAINING PROGRAM

## 2023-10-14 PROCEDURE — 700105 HCHG RX REV CODE 258: Performed by: EMERGENCY MEDICINE

## 2023-10-14 PROCEDURE — 80053 COMPREHEN METABOLIC PANEL: CPT

## 2023-10-14 PROCEDURE — 36415 COLL VENOUS BLD VENIPUNCTURE: CPT

## 2023-10-14 PROCEDURE — 81001 URINALYSIS AUTO W/SCOPE: CPT

## 2023-10-14 PROCEDURE — 71045 X-RAY EXAM CHEST 1 VIEW: CPT

## 2023-10-14 PROCEDURE — 700111 HCHG RX REV CODE 636 W/ 250 OVERRIDE (IP): Mod: UD | Performed by: EMERGENCY MEDICINE

## 2023-10-14 PROCEDURE — 96376 TX/PRO/DX INJ SAME DRUG ADON: CPT

## 2023-10-14 PROCEDURE — 85610 PROTHROMBIN TIME: CPT

## 2023-10-14 PROCEDURE — 99285 EMERGENCY DEPT VISIT HI MDM: CPT

## 2023-10-14 PROCEDURE — 96365 THER/PROPH/DIAG IV INF INIT: CPT

## 2023-10-14 PROCEDURE — C9113 INJ PANTOPRAZOLE SODIUM, VIA: HCPCS | Mod: UD | Performed by: EMERGENCY MEDICINE

## 2023-10-14 PROCEDURE — 96375 TX/PRO/DX INJ NEW DRUG ADDON: CPT

## 2023-10-14 PROCEDURE — 84484 ASSAY OF TROPONIN QUANT: CPT | Mod: 91

## 2023-10-14 PROCEDURE — 93005 ELECTROCARDIOGRAM TRACING: CPT | Performed by: EMERGENCY MEDICINE

## 2023-10-14 PROCEDURE — 83690 ASSAY OF LIPASE: CPT

## 2023-10-14 PROCEDURE — 84703 CHORIONIC GONADOTROPIN ASSAY: CPT

## 2023-10-14 PROCEDURE — 93005 ELECTROCARDIOGRAM TRACING: CPT

## 2023-10-14 RX ORDER — ONDANSETRON 2 MG/ML
4 INJECTION INTRAMUSCULAR; INTRAVENOUS ONCE
Status: COMPLETED | OUTPATIENT
Start: 2023-10-14 | End: 2023-10-14

## 2023-10-14 RX ORDER — SODIUM CHLORIDE 9 MG/ML
1000 INJECTION, SOLUTION INTRAVENOUS ONCE
Status: COMPLETED | OUTPATIENT
Start: 2023-10-14 | End: 2023-10-14

## 2023-10-14 RX ORDER — OCTREOTIDE ACETATE 100 UG/ML
50 INJECTION, SOLUTION INTRAVENOUS; SUBCUTANEOUS ONCE
Status: COMPLETED | OUTPATIENT
Start: 2023-10-14 | End: 2023-10-14

## 2023-10-14 RX ORDER — HYDROMORPHONE HYDROCHLORIDE 1 MG/ML
0.5 INJECTION, SOLUTION INTRAMUSCULAR; INTRAVENOUS; SUBCUTANEOUS ONCE
Status: COMPLETED | OUTPATIENT
Start: 2023-10-14 | End: 2023-10-14

## 2023-10-14 RX ADMIN — FAMOTIDINE 20 MG: 10 INJECTION, SOLUTION INTRAVENOUS at 21:35

## 2023-10-14 RX ADMIN — SODIUM CHLORIDE 1000 ML: 9 INJECTION, SOLUTION INTRAVENOUS at 21:40

## 2023-10-14 RX ADMIN — HYDROMORPHONE HYDROCHLORIDE 0.5 MG: 1 INJECTION, SOLUTION INTRAMUSCULAR; INTRAVENOUS; SUBCUTANEOUS at 23:11

## 2023-10-14 RX ADMIN — PANTOPRAZOLE SODIUM 80 MG: 40 INJECTION, POWDER, FOR SOLUTION INTRAVENOUS at 23:12

## 2023-10-14 RX ADMIN — OCTREOTIDE ACETATE 50 MCG: 100 INJECTION, SOLUTION INTRAVENOUS; SUBCUTANEOUS at 23:55

## 2023-10-14 RX ADMIN — ONDANSETRON 4 MG: 2 INJECTION INTRAMUSCULAR; INTRAVENOUS at 21:35

## 2023-10-14 ASSESSMENT — PAIN DESCRIPTION - PAIN TYPE: TYPE: ACUTE PAIN

## 2023-10-15 ENCOUNTER — ANESTHESIA (OUTPATIENT)
Dept: SURGERY | Facility: MEDICAL CENTER | Age: 38
End: 2023-10-15
Payer: MEDICARE

## 2023-10-15 ENCOUNTER — ANESTHESIA EVENT (OUTPATIENT)
Dept: SURGERY | Facility: MEDICAL CENTER | Age: 38
End: 2023-10-15
Payer: MEDICARE

## 2023-10-15 PROBLEM — D69.6 THROMBOCYTOPENIA (HCC): Status: ACTIVE | Noted: 2023-10-15

## 2023-10-15 PROBLEM — R82.71 ASYMPTOMATIC BACTERIURIA: Status: ACTIVE | Noted: 2023-10-15

## 2023-10-15 PROBLEM — K75.4 AUTOIMMUNE HEPATITIS (HCC): Status: ACTIVE | Noted: 2023-10-15

## 2023-10-15 PROBLEM — I95.9 HYPOTENSION: Status: ACTIVE | Noted: 2023-10-15

## 2023-10-15 PROBLEM — K92.0 HEMATEMESIS: Status: ACTIVE | Noted: 2023-10-15

## 2023-10-15 LAB
ANISOCYTOSIS BLD QL SMEAR: ABNORMAL
BASOPHILS # BLD AUTO: 0.4 % (ref 0–1.8)
BASOPHILS # BLD AUTO: 0.7 % (ref 0–1.8)
BASOPHILS # BLD AUTO: 0.8 % (ref 0–1.8)
BASOPHILS # BLD: 0.01 K/UL (ref 0–0.12)
BASOPHILS # BLD: 0.02 K/UL (ref 0–0.12)
BASOPHILS # BLD: 0.02 K/UL (ref 0–0.12)
CFT BLD TEG: 7 MIN (ref 4.6–9.1)
CFT P HPASE BLD TEG: 6.4 MIN (ref 4.3–8.3)
CLOT ANGLE BLD TEG: 64.3 DEGREES (ref 63–78)
CLOT LYSIS 30M P MA LENFR BLD TEG: 0.3 % (ref 0–2.6)
COMMENT 1642: NORMAL
CT.EXTRINSIC BLD ROTEM: 2.8 MIN (ref 0.8–2.1)
EOSINOPHIL # BLD AUTO: 0.18 K/UL (ref 0–0.51)
EOSINOPHIL # BLD AUTO: 0.18 K/UL (ref 0–0.51)
EOSINOPHIL # BLD AUTO: 0.19 K/UL (ref 0–0.51)
EOSINOPHIL NFR BLD: 6.4 % (ref 0–6.9)
EOSINOPHIL NFR BLD: 7.2 % (ref 0–6.9)
EOSINOPHIL NFR BLD: 7.9 % (ref 0–6.9)
ERYTHROCYTE [DISTWIDTH] IN BLOOD BY AUTOMATED COUNT: 44.1 FL (ref 35.9–50)
ERYTHROCYTE [DISTWIDTH] IN BLOOD BY AUTOMATED COUNT: 44.2 FL (ref 35.9–50)
ERYTHROCYTE [DISTWIDTH] IN BLOOD BY AUTOMATED COUNT: 45.5 FL (ref 35.9–50)
HCT VFR BLD AUTO: 30.1 % (ref 37–47)
HCT VFR BLD AUTO: 31.4 % (ref 37–47)
HCT VFR BLD AUTO: 31.7 % (ref 37–47)
HGB BLD-MCNC: 9.4 G/DL (ref 12–16)
HGB BLD-MCNC: 9.4 G/DL (ref 12–16)
HGB BLD-MCNC: 9.7 G/DL (ref 12–16)
HYPOCHROMIA BLD QL SMEAR: ABNORMAL
IMM GRANULOCYTES # BLD AUTO: 0 K/UL (ref 0–0.11)
IMM GRANULOCYTES # BLD AUTO: 0.01 K/UL (ref 0–0.11)
IMM GRANULOCYTES # BLD AUTO: 0.01 K/UL (ref 0–0.11)
IMM GRANULOCYTES NFR BLD AUTO: 0 % (ref 0–0.9)
IMM GRANULOCYTES NFR BLD AUTO: 0.4 % (ref 0–0.9)
IMM GRANULOCYTES NFR BLD AUTO: 0.4 % (ref 0–0.9)
INR PPP: 1.61 (ref 0.87–1.13)
LYMPHOCYTES # BLD AUTO: 0.43 K/UL (ref 1–4.8)
LYMPHOCYTES # BLD AUTO: 0.46 K/UL (ref 1–4.8)
LYMPHOCYTES # BLD AUTO: 0.46 K/UL (ref 1–4.8)
LYMPHOCYTES NFR BLD: 15.4 % (ref 22–41)
LYMPHOCYTES NFR BLD: 18.5 % (ref 22–41)
LYMPHOCYTES NFR BLD: 19.1 % (ref 22–41)
MCF BLD TEG: 41.2 MM (ref 52–69)
MCF.PLATELET INHIB BLD ROTEM: 11.7 MM (ref 15–32)
MCH RBC QN AUTO: 26.1 PG (ref 27–33)
MCH RBC QN AUTO: 26.3 PG (ref 27–33)
MCH RBC QN AUTO: 26.8 PG (ref 27–33)
MCHC RBC AUTO-ENTMCNC: 29.9 G/DL (ref 32.2–35.5)
MCHC RBC AUTO-ENTMCNC: 30.6 G/DL (ref 32.2–35.5)
MCHC RBC AUTO-ENTMCNC: 31.2 G/DL (ref 32.2–35.5)
MCV RBC AUTO: 85.8 FL (ref 81.4–97.8)
MCV RBC AUTO: 85.9 FL (ref 81.4–97.8)
MCV RBC AUTO: 87.2 FL (ref 81.4–97.8)
MICROCYTES BLD QL SMEAR: ABNORMAL
MONOCYTES # BLD AUTO: 0.29 K/UL (ref 0–0.85)
MONOCYTES # BLD AUTO: 0.33 K/UL (ref 0–0.85)
MONOCYTES # BLD AUTO: 0.43 K/UL (ref 0–0.85)
MONOCYTES NFR BLD AUTO: 11.6 % (ref 0–13.4)
MONOCYTES NFR BLD AUTO: 13.7 % (ref 0–13.4)
MONOCYTES NFR BLD AUTO: 15.4 % (ref 0–13.4)
MORPHOLOGY BLD-IMP: NORMAL
NEUTROPHILS # BLD AUTO: 1.4 K/UL (ref 1.82–7.42)
NEUTROPHILS # BLD AUTO: 1.54 K/UL (ref 1.82–7.42)
NEUTROPHILS # BLD AUTO: 1.74 K/UL (ref 1.82–7.42)
NEUTROPHILS NFR BLD: 58.1 % (ref 44–72)
NEUTROPHILS NFR BLD: 61.9 % (ref 44–72)
NEUTROPHILS NFR BLD: 62.1 % (ref 44–72)
NRBC # BLD AUTO: 0 K/UL
NRBC BLD-RTO: 0 /100 WBC (ref 0–0.2)
OVALOCYTES BLD QL SMEAR: NORMAL
PA AA BLD-ACNC: 61 % (ref 0–11)
PA ADP BLD-ACNC: 25.5 % (ref 0–17)
PLATELET # BLD AUTO: 52 K/UL (ref 164–446)
PLATELET # BLD AUTO: 52 K/UL (ref 164–446)
PLATELET # BLD AUTO: 53 K/UL (ref 164–446)
PLATELET BLD QL SMEAR: NORMAL
PLATELETS.RETICULATED NFR BLD AUTO: 3 % (ref 0.6–13.1)
PLATELETS.RETICULATED NFR BLD AUTO: 3.1 % (ref 0.6–13.1)
PLATELETS.RETICULATED NFR BLD AUTO: 3.4 % (ref 0.6–13.1)
PMV BLD AUTO: 10.5 FL (ref 9–12.9)
PMV BLD AUTO: 10.8 FL (ref 9–12.9)
PMV BLD AUTO: 10.9 FL (ref 9–12.9)
POIKILOCYTOSIS BLD QL SMEAR: NORMAL
PROTHROMBIN TIME: 19.4 SEC (ref 12–14.6)
RBC # BLD AUTO: 3.51 M/UL (ref 4.2–5.4)
RBC # BLD AUTO: 3.6 M/UL (ref 4.2–5.4)
RBC # BLD AUTO: 3.69 M/UL (ref 4.2–5.4)
RBC BLD AUTO: PRESENT
TEG ALGORITHM TGALG: ABNORMAL
TROPONIN T SERPL-MCNC: <6 NG/L (ref 6–19)
WBC # BLD AUTO: 2.4 K/UL (ref 4.8–10.8)
WBC # BLD AUTO: 2.5 K/UL (ref 4.8–10.8)
WBC # BLD AUTO: 2.8 K/UL (ref 4.8–10.8)

## 2023-10-15 PROCEDURE — 96375 TX/PRO/DX INJ NEW DRUG ADDON: CPT

## 2023-10-15 PROCEDURE — 85025 COMPLETE CBC W/AUTO DIFF WBC: CPT

## 2023-10-15 PROCEDURE — A9270 NON-COVERED ITEM OR SERVICE: HCPCS | Mod: UD | Performed by: STUDENT IN AN ORGANIZED HEALTH CARE EDUCATION/TRAINING PROGRAM

## 2023-10-15 PROCEDURE — 700111 HCHG RX REV CODE 636 W/ 250 OVERRIDE (IP): Mod: UD | Performed by: ANESTHESIOLOGY

## 2023-10-15 PROCEDURE — 85347 COAGULATION TIME ACTIVATED: CPT

## 2023-10-15 PROCEDURE — 160002 HCHG RECOVERY MINUTES (STAT): Performed by: INTERNAL MEDICINE

## 2023-10-15 PROCEDURE — C9113 INJ PANTOPRAZOLE SODIUM, VIA: HCPCS | Mod: UD | Performed by: STUDENT IN AN ORGANIZED HEALTH CARE EDUCATION/TRAINING PROGRAM

## 2023-10-15 PROCEDURE — 43239 EGD BIOPSY SINGLE/MULTIPLE: CPT | Performed by: INTERNAL MEDICINE

## 2023-10-15 PROCEDURE — 85576 BLOOD PLATELET AGGREGATION: CPT | Mod: 91

## 2023-10-15 PROCEDURE — 700105 HCHG RX REV CODE 258: Mod: UD | Performed by: STUDENT IN AN ORGANIZED HEALTH CARE EDUCATION/TRAINING PROGRAM

## 2023-10-15 PROCEDURE — 85055 RETICULATED PLATELET ASSAY: CPT | Mod: 91

## 2023-10-15 PROCEDURE — 160203 HCHG ENDO MINUTES - 1ST 30 MINS LEVEL 4: Performed by: INTERNAL MEDICINE

## 2023-10-15 PROCEDURE — G0378 HOSPITAL OBSERVATION PER HR: HCPCS

## 2023-10-15 PROCEDURE — 700111 HCHG RX REV CODE 636 W/ 250 OVERRIDE (IP): Mod: UD | Performed by: STUDENT IN AN ORGANIZED HEALTH CARE EDUCATION/TRAINING PROGRAM

## 2023-10-15 PROCEDURE — 160035 HCHG PACU - 1ST 60 MINS PHASE I: Performed by: INTERNAL MEDICINE

## 2023-10-15 PROCEDURE — 96376 TX/PRO/DX INJ SAME DRUG ADON: CPT

## 2023-10-15 PROCEDURE — 36415 COLL VENOUS BLD VENIPUNCTURE: CPT

## 2023-10-15 PROCEDURE — 88312 SPECIAL STAINS GROUP 1: CPT

## 2023-10-15 PROCEDURE — 700105 HCHG RX REV CODE 258: Mod: UD | Performed by: ANESTHESIOLOGY

## 2023-10-15 PROCEDURE — A9270 NON-COVERED ITEM OR SERVICE: HCPCS | Mod: UD

## 2023-10-15 PROCEDURE — 99233 SBSQ HOSP IP/OBS HIGH 50: CPT | Mod: AI,GC

## 2023-10-15 PROCEDURE — 700102 HCHG RX REV CODE 250 W/ 637 OVERRIDE(OP): Mod: UD | Performed by: STUDENT IN AN ORGANIZED HEALTH CARE EDUCATION/TRAINING PROGRAM

## 2023-10-15 PROCEDURE — 99222 1ST HOSP IP/OBS MODERATE 55: CPT | Mod: 25 | Performed by: INTERNAL MEDICINE

## 2023-10-15 PROCEDURE — 160048 HCHG OR STATISTICAL LEVEL 1-5: Performed by: INTERNAL MEDICINE

## 2023-10-15 PROCEDURE — 85384 FIBRINOGEN ACTIVITY: CPT

## 2023-10-15 PROCEDURE — 700102 HCHG RX REV CODE 250 W/ 637 OVERRIDE(OP): Mod: UD

## 2023-10-15 PROCEDURE — 88305 TISSUE EXAM BY PATHOLOGIST: CPT

## 2023-10-15 PROCEDURE — 160009 HCHG ANES TIME/MIN: Performed by: INTERNAL MEDICINE

## 2023-10-15 RX ORDER — MIDODRINE HYDROCHLORIDE 5 MG/1
2.5 TABLET ORAL
Status: DISCONTINUED | OUTPATIENT
Start: 2023-10-15 | End: 2023-10-17 | Stop reason: HOSPADM

## 2023-10-15 RX ORDER — OMEPRAZOLE 20 MG/1
40 CAPSULE, DELAYED RELEASE ORAL 2 TIMES DAILY
Status: DISCONTINUED | OUTPATIENT
Start: 2023-10-15 | End: 2023-10-17 | Stop reason: HOSPADM

## 2023-10-15 RX ORDER — PANTOPRAZOLE SODIUM 40 MG/10ML
40 INJECTION, POWDER, LYOPHILIZED, FOR SOLUTION INTRAVENOUS 2 TIMES DAILY
Status: DISCONTINUED | OUTPATIENT
Start: 2023-10-15 | End: 2023-10-15

## 2023-10-15 RX ORDER — ONDANSETRON 2 MG/ML
4 INJECTION INTRAMUSCULAR; INTRAVENOUS EVERY 4 HOURS PRN
Status: DISCONTINUED | OUTPATIENT
Start: 2023-10-15 | End: 2023-10-17 | Stop reason: HOSPADM

## 2023-10-15 RX ORDER — OCTREOTIDE ACETATE 100 UG/ML
50 INJECTION, SOLUTION INTRAVENOUS; SUBCUTANEOUS DAILY
Status: DISCONTINUED | OUTPATIENT
Start: 2023-10-15 | End: 2023-10-15

## 2023-10-15 RX ORDER — PHENYLEPHRINE HCL IN 0.9% NACL 0.5 MG/5ML
SYRINGE (ML) INTRAVENOUS PRN
Status: DISCONTINUED | OUTPATIENT
Start: 2023-10-15 | End: 2023-10-15 | Stop reason: SURG

## 2023-10-15 RX ORDER — SODIUM CHLORIDE, SODIUM LACTATE, POTASSIUM CHLORIDE, CALCIUM CHLORIDE 600; 310; 30; 20 MG/100ML; MG/100ML; MG/100ML; MG/100ML
INJECTION, SOLUTION INTRAVENOUS
Status: DISCONTINUED | OUTPATIENT
Start: 2023-10-15 | End: 2023-10-15 | Stop reason: SURG

## 2023-10-15 RX ORDER — SODIUM CHLORIDE, SODIUM LACTATE, POTASSIUM CHLORIDE, AND CALCIUM CHLORIDE .6; .31; .03; .02 G/100ML; G/100ML; G/100ML; G/100ML
1000 INJECTION, SOLUTION INTRAVENOUS ONCE
Status: COMPLETED | OUTPATIENT
Start: 2023-10-15 | End: 2023-10-15

## 2023-10-15 RX ORDER — ACETAMINOPHEN 500 MG
1000 TABLET ORAL ONCE
Status: COMPLETED | OUTPATIENT
Start: 2023-10-15 | End: 2023-10-15

## 2023-10-15 RX ORDER — HYDROMORPHONE HYDROCHLORIDE 1 MG/ML
0.5 INJECTION, SOLUTION INTRAMUSCULAR; INTRAVENOUS; SUBCUTANEOUS EVERY 4 HOURS PRN
Status: DISCONTINUED | OUTPATIENT
Start: 2023-10-15 | End: 2023-10-17

## 2023-10-15 RX ORDER — KETOROLAC TROMETHAMINE 30 MG/ML
15 INJECTION, SOLUTION INTRAMUSCULAR; INTRAVENOUS EVERY 6 HOURS PRN
Status: DISCONTINUED | OUTPATIENT
Start: 2023-10-15 | End: 2023-10-15

## 2023-10-15 RX ADMIN — OMEPRAZOLE 40 MG: 20 CAPSULE, DELAYED RELEASE ORAL at 17:30

## 2023-10-15 RX ADMIN — SODIUM CHLORIDE, POTASSIUM CHLORIDE, SODIUM LACTATE AND CALCIUM CHLORIDE: 600; 310; 30; 20 INJECTION, SOLUTION INTRAVENOUS at 10:42

## 2023-10-15 RX ADMIN — HYDROMORPHONE HYDROCHLORIDE 0.5 MG: 1 INJECTION, SOLUTION INTRAMUSCULAR; INTRAVENOUS; SUBCUTANEOUS at 12:33

## 2023-10-15 RX ADMIN — PROPOFOL 20 MG: 10 INJECTION, EMULSION INTRAVENOUS at 10:48

## 2023-10-15 RX ADMIN — PROPOFOL 90 MG: 10 INJECTION, EMULSION INTRAVENOUS at 10:45

## 2023-10-15 RX ADMIN — HYDROMORPHONE HYDROCHLORIDE 0.5 MG: 1 INJECTION, SOLUTION INTRAMUSCULAR; INTRAVENOUS; SUBCUTANEOUS at 07:52

## 2023-10-15 RX ADMIN — HYDROMORPHONE HYDROCHLORIDE 0.5 MG: 1 INJECTION, SOLUTION INTRAMUSCULAR; INTRAVENOUS; SUBCUTANEOUS at 17:31

## 2023-10-15 RX ADMIN — Medication 100 MCG: at 10:45

## 2023-10-15 RX ADMIN — HYDROMORPHONE HYDROCHLORIDE 0.5 MG: 1 INJECTION, SOLUTION INTRAMUSCULAR; INTRAVENOUS; SUBCUTANEOUS at 21:39

## 2023-10-15 RX ADMIN — OCTREOTIDE ACETATE 50 MCG/HR: 200 INJECTION, SOLUTION INTRAVENOUS; SUBCUTANEOUS at 02:28

## 2023-10-15 RX ADMIN — SODIUM CHLORIDE, POTASSIUM CHLORIDE, SODIUM LACTATE AND CALCIUM CHLORIDE 1000 ML: 600; 310; 30; 20 INJECTION, SOLUTION INTRAVENOUS at 01:27

## 2023-10-15 RX ADMIN — HYDROMORPHONE HYDROCHLORIDE 0.5 MG: 1 INJECTION, SOLUTION INTRAMUSCULAR; INTRAVENOUS; SUBCUTANEOUS at 02:51

## 2023-10-15 RX ADMIN — MIDODRINE HYDROCHLORIDE 2.5 MG: 5 TABLET ORAL at 17:31

## 2023-10-15 RX ADMIN — PANTOPRAZOLE SODIUM 40 MG: 40 INJECTION, POWDER, FOR SOLUTION INTRAVENOUS at 05:12

## 2023-10-15 RX ADMIN — ACETAMINOPHEN 1000 MG: 500 TABLET, FILM COATED ORAL at 23:49

## 2023-10-15 RX ADMIN — CEFTRIAXONE SODIUM 1000 MG: 10 INJECTION, POWDER, FOR SOLUTION INTRAVENOUS at 05:12

## 2023-10-15 ASSESSMENT — PAIN DESCRIPTION - PAIN TYPE
TYPE: ACUTE PAIN

## 2023-10-15 ASSESSMENT — ENCOUNTER SYMPTOMS
BLOOD IN STOOL: 0
CONSTIPATION: 0
NAUSEA: 1
SINUS PAIN: 0
HEMOPTYSIS: 0
BLURRED VISION: 0
MYALGIAS: 0
TINGLING: 0
PALPITATIONS: 0
STRIDOR: 0
ABDOMINAL PAIN: 1
COUGH: 0
WHEEZING: 0
DIARRHEA: 0
SPEECH CHANGE: 0
FEVER: 0
DOUBLE VISION: 0
HEMOPTYSIS: 1
SPUTUM PRODUCTION: 0
VOMITING: 0
VOMITING: 1
ORTHOPNEA: 0
DIZZINESS: 1
TREMORS: 0
SORE THROAT: 0
CLAUDICATION: 0
BACK PAIN: 0
NECK PAIN: 0
HEARTBURN: 0
HEADACHES: 1
SENSORY CHANGE: 0
SHORTNESS OF BREATH: 0
CHILLS: 0
FOCAL WEAKNESS: 0

## 2023-10-15 ASSESSMENT — LIFESTYLE VARIABLES
EVER HAD A DRINK FIRST THING IN THE MORNING TO STEADY YOUR NERVES TO GET RID OF A HANGOVER: NO
HOW MANY TIMES IN THE PAST YEAR HAVE YOU HAD 5 OR MORE DRINKS IN A DAY: 0
TOTAL SCORE: 0
TOTAL SCORE: 0
CONSUMPTION TOTAL: NEGATIVE
ON A TYPICAL DAY WHEN YOU DRINK ALCOHOL HOW MANY DRINKS DO YOU HAVE: 0
HAVE YOU EVER FELT YOU SHOULD CUT DOWN ON YOUR DRINKING: NO
TOTAL SCORE: 0
ALCOHOL_USE: NO
AVERAGE NUMBER OF DAYS PER WEEK YOU HAVE A DRINK CONTAINING ALCOHOL: 0
DOES PATIENT WANT TO STOP DRINKING: NO
HAVE PEOPLE ANNOYED YOU BY CRITICIZING YOUR DRINKING: NO
EVER FELT BAD OR GUILTY ABOUT YOUR DRINKING: NO

## 2023-10-15 ASSESSMENT — FIBROSIS 4 INDEX
FIB4 SCORE: 3.79

## 2023-10-15 ASSESSMENT — PATIENT HEALTH QUESTIONNAIRE - PHQ9
1. LITTLE INTEREST OR PLEASURE IN DOING THINGS: NOT AT ALL
1. LITTLE INTEREST OR PLEASURE IN DOING THINGS: NOT AT ALL
2. FEELING DOWN, DEPRESSED, IRRITABLE, OR HOPELESS: NOT AT ALL
SUM OF ALL RESPONSES TO PHQ9 QUESTIONS 1 AND 2: 0
2. FEELING DOWN, DEPRESSED, IRRITABLE, OR HOPELESS: NOT AT ALL
1. LITTLE INTEREST OR PLEASURE IN DOING THINGS: NOT AT ALL
SUM OF ALL RESPONSES TO PHQ9 QUESTIONS 1 AND 2: 0
2. FEELING DOWN, DEPRESSED, IRRITABLE, OR HOPELESS: NOT AT ALL
2. FEELING DOWN, DEPRESSED, IRRITABLE, OR HOPELESS: NOT AT ALL
SUM OF ALL RESPONSES TO PHQ9 QUESTIONS 1 AND 2: 0
SUM OF ALL RESPONSES TO PHQ9 QUESTIONS 1 AND 2: 0

## 2023-10-15 ASSESSMENT — COGNITIVE AND FUNCTIONAL STATUS - GENERAL
MOBILITY SCORE: 24
SUGGESTED CMS G CODE MODIFIER DAILY ACTIVITY: CH
DAILY ACTIVITIY SCORE: 24
SUGGESTED CMS G CODE MODIFIER MOBILITY: CH

## 2023-10-15 ASSESSMENT — PAIN SCALES - GENERAL: PAIN_LEVEL: 4

## 2023-10-15 NOTE — ED TRIAGE NOTES
"Chief Complaint   Patient presents with    Blood in Vomit     Nauseas since 1600, pt vomited bright red blood at 1900 x1 episode. H/o varicies. Epigastric pain 7/10, burning per pt. -thinners   Denies SOB. Denies cardiac h/o.        BIB [facility] to [facility], pt on monitor and in gown, labs drawn and sent. Pt consists of: above complaint, pt A&ox4, on room air. Pt states she is in [facility] for \"prescription drugs/opiods\". Pt sates h/o hepatitis. States she still feels nauseas.    Medications given en route:n/a     /67   Pulse 86   Temp 36.8 °C (98.3 °F) (Temporal)   Resp 14   Ht 1.727 m (5' 8\")   Wt 72.6 kg (160 lb)   SpO2 98%   BMI 24.33 kg/m²     "

## 2023-10-15 NOTE — ANESTHESIA TIME REPORT
Anesthesia Start and Stop Event Times     Date Time Event    10/15/2023 1021 Ready for Procedure     1042 Anesthesia Start     1102 Anesthesia Stop        Responsible Staff  10/15/23    Name Role Begin End    Fuad Montes M.D. Anesth 1042 1102        Overtime Reason:  no overtime (within assigned shift)    Comments:

## 2023-10-15 NOTE — ED NOTES
Med rec updated and complete. Allergies reviewed. Confirmed name and date of birth.  Pt denies taking medications.    Home pharmacy  [facility] = [phone]

## 2023-10-15 NOTE — PROGRESS NOTES
[facility] Internal Medicine Daily Progress Note    Date of Service  10/15/2023    R Team: R IM Orange Team   Attending: Hemanth John M.d.  Senior Resident: Dr. Bolden  Contact Number: [phone]    Chief Complaint  Tonie Tineo is a 38 y.o. female admitted 10/14/2023 with hematemesis.     Hospital Course  38 year old female patient with a past medical history significant for autoimmune hepatitis, esophageal banding for varices ~9 months ago, intermittent drug-induced lupus, who presents to the hospital 10/14 with chief complaint of hematemesis at 4pm and 7pm on day of presentation. Patient endorses she does not have regular follow up with her PCP, and has not taken any of her outpatient medications for at least 3 months. She is typically on spironolactone, furosemide, midodrine, rifaximin, lactulose. She follows with Dr. Ed Leonardo in [place]. Consequently developed recurrent migraines, for which she has been taking about 4 Ibuprofens per day for the past several weeks. Noted to be anemic to 9.7 and thrombocytopenic to 57. 2 large bore IV placed, started on IV protonix and octreotide. Blood pressures persistently low which per her account was her baseline, restarted on midodrine. GI consulted following morning for endoscopy which showed gastric ulcers at the antrum representing likely bleeding source. Recommended oral PPI BID for 8 weeks and outpatient follow-up with GI    Interval Problem Update  No acute events since admission this morning. Blood pressure remaining consistently low, around 90s systolic, however per patient this is normal for her, she is not feeling lightheaded or faint. No further episodes of hematemesis.     I have discussed this patient's plan of care and discharge plan at IDT rounds today with Case Management, Nursing, Nursing leadership, and other members of the IDT team.    Consultants/Specialty  GI    Code Status  Full Code    Disposition  The patient is not medically cleared for  discharge to home or a post-acute facility.  Anticipate discharge to: home with close outpatient follow-up    I have placed the appropriate orders for post-discharge needs.    Review of Systems  Review of Systems   Constitutional:  Positive for malaise/fatigue. Negative for chills and fever.   HENT:  Negative for sinus pain and sore throat.    Eyes:  Negative for blurred vision.   Respiratory:  Negative for cough, hemoptysis, sputum production, shortness of breath and wheezing.    Cardiovascular:  Negative for chest pain, palpitations, orthopnea, claudication and leg swelling.   Gastrointestinal:  Positive for abdominal pain and nausea. Negative for blood in stool, constipation, diarrhea and vomiting.   Genitourinary:  Negative for dysuria, frequency and urgency.   Skin:  Negative for rash.   Neurological:  Positive for dizziness and headaches. Negative for tingling, tremors, sensory change, speech change and focal weakness.        Physical Exam  Temp:  [36.1 °C (97 °F)-36.9 °C (98.4 °F)] 36.8 °C (98.3 °F)  Pulse:  [63-86] 70  Resp:  [12-25] 18  BP: ()/(44-67) 95/54  SpO2:  [91 %-100 %] 98 %    Physical Exam  Constitutional:       General: She is not in acute distress.     Appearance: Normal appearance. She is ill-appearing. She is not toxic-appearing or diaphoretic.   HENT:      Head: Normocephalic and atraumatic.      Mouth/Throat:      Mouth: Mucous membranes are moist.      Pharynx: Oropharynx is clear. No oropharyngeal exudate or posterior oropharyngeal erythema.   Eyes:      General: No scleral icterus.        Right eye: No discharge.         Left eye: No discharge.      Extraocular Movements: Extraocular movements intact.      Conjunctiva/sclera: Conjunctivae normal.   Cardiovascular:      Rate and Rhythm: Normal rate and regular rhythm.      Pulses: Normal pulses.      Heart sounds: No murmur heard.  Pulmonary:      Effort: Pulmonary effort is normal. No respiratory distress.      Breath sounds: Normal  breath sounds. No wheezing.   Abdominal:      Tenderness: There is abdominal tenderness.   Musculoskeletal:      Cervical back: Neck supple. No tenderness.   Neurological:      Mental Status: She is alert.         Fluids    Intake/Output Summary (Last 24 hours) at 10/15/2023 1342  Last data filed at 10/15/2023 1102  Gross per 24 hour   Intake 1400 ml   Output 700 ml   Net 700 ml       Laboratory  Recent Labs     10/14/23  2116 10/15/23  0143 10/15/23  1219   WBC 3.7* 2.5* 2.4*   RBC 3.64* 3.51* 3.60*   HEMOGLOBIN 9.7* 9.4* 9.4*   HEMATOCRIT 30.8* 30.1* 31.4*   MCV 84.6 85.8 87.2   MCH 26.6* 26.8* 26.1*   MCHC 31.5* 31.2* 29.9*   RDW 43.8 44.1 45.5   PLATELETCT 57* 52* 52*   MPV 11.0 10.9 10.5     Recent Labs     10/14/23  2116   SODIUM 139   POTASSIUM 3.7   CHLORIDE 109   CO2 20   GLUCOSE 122*   BUN 9   CREATININE 0.39*   CALCIUM 9.1     Recent Labs     10/14/23  2116   INR 1.61*               Imaging  DX-CHEST-PORTABLE (1 VIEW)   Final Result         No acute cardiac or pulmonary abnormality is identified.           Assessment/Plan  Problem Representation:    * Hematemesis  Assessment & Plan  Initial Hgb 9.7, Hct 30.8; MCV 84.6. Patient believes her baseline Hgb is around 10-11. Started initially on IV octreotide, PPI, and ceftriaxone prophylaxis. No further active bleeding since admission. H+H flat-trending.   -GI consulted, upper endoscopy 10/15 showing antral ulcers likely source  -Per GI recommended 8 weeks oral PPI 40mg BID followed by taper  -Will monitor H+H, transfuse for Hgb <7    Hypotension  Assessment & Plan  Patient with lightheadedness when standing on admission, which resolved after IVF administration  -SBPs 90s to low 100s  -She endorses her blood pressures typically run low  -Outpatient is on Midodrine but has been nonadherent  -Resuming Midodrine 2.5mg TID    Thrombocytopenia (HCC)  Assessment & Plan  Initial platelets 52k  -Transfuse platelets above to goal >50 in active bleeding, otherwise lower  transfusion target  -Plt TEG inhibited consider ddavp if concern for rebleed  -Thombocytopenia likely in the context of autoimmune liver disease    Autoimmune hepatitis (HCC)  Assessment & Plan  -Patient previously on immunosuppressants  -Esophageal banding for varices ~9 months ago most recently  -Typically on spironolactone, furosemide, rifaximin, lactulose outpatient; has been nonadherent; holding  -Outpatient follows with Dr. Ed Leonardo, based in [place]    Asymptomatic bacteriuria  Assessment & Plan  -UA with positive nitrite and LE  -Patient denies dysuria, frequency, urgency         VTE prophylaxis: SCDs/TEDs    I have performed a physical exam and reviewed and updated ROS and Plan today (10/15/2023). In review of yesterday's note (10/14/2023), there are no changes except as documented above.

## 2023-10-15 NOTE — ASSESSMENT & PLAN NOTE
-Patient previously on immunosuppressants per her account  -Esophageal banding for varices ~9 months ago most recently  -Typically on spironolactone, furosemide, rifaximin, lactulose outpatient; has been nonadherent; holding   -Called patient's prior pharmacy [facility] in [place], per their records patient has not picked up medications since May, was previously on rifaximin 550mg BID, nadolol 20mg daily, citalopram 20mg daily, lasix 40mg daily, and spironolactone 50mg BID.   -Outpatient follows with Dr. Ed Leonardo, based in [place]  -GI recommended obtaining AFP and follow-up with abdominal ultrasound outpatient

## 2023-10-15 NOTE — ASSESSMENT & PLAN NOTE
Presented with hematemesis x2, history of variceal bleeding. Initial Hgb 9.7, Hct 30.8; MCV 84.6. Patient believes her baseline Hgb is around 10-11. Started initially on IV octreotide, PPI, and ceftriaxone prophylaxis. No further active bleeding since admission. H+H flat-trending.   -GI consulted, upper endoscopy 10/15 showing antral ulcers likely source  -Per GI recommended 8 weeks oral PPI 40mg BID followed by taper

## 2023-10-15 NOTE — H&P
History & Physical Note    Date of Admission: 10/14/2023  Admission Status: Observation-Outpatient  Senior Resident: Camilo Pacheco M.D.  Contact Number: [phone]    Chief Complaint: Hematemesis    History of Present Illness (HPI):     Mrs. Tineo is our 38 year old female patient with a past medical history significant for autoimmune hepatitis, esophageal banding for varices ~9 months ago, intermittent drug-induced lupus, who presents to the hospital 10/14 with chief complaint of hematemesis at 4pm and 7pm on day of presentation. Patient endorses she does not have regular follow up with her PCP, and has not taken any of her outpatient medications for at least 3 months. She is typically on spironolactone, furosemide, midodrine, rifaximin, lactulose. Consequently developed recurrent migraines, for which she has been taking about 4 Ibuprofens per day for the past several weeks. Endorses lightheadedness when standing. Denies chest pain, shortness of breath, fevers, chills, melena, or hematochezia. Patient denies any history of drinking alcohol.    In the ED, patient HD stable BP 93/54, HR 82, RR 14 Saturating well on Room Air; not in respiratory distress with clear airway    Received 1L NS IVF bolus; Octreotide 50mcg, Protonix 80mg IV    Anemic with Hgb 9.7, Hct 30.8; MCV 84.6  Thrombocytopenic with Plt 57  UA with positive nitrite and LE    Lipase WNL; Troponin (-); HCG (-)  EKG NSR; HR 81; QTc 432    Patient has not had recurrent episode of hematemesis in the ED. Has remained hemodynamically stable; no respiratory distress or compromised airway; saturating well on room air. Shock index of less than 1. Monitoring closely, treating, and consulting GI in the morning.    Admitted to Telemetry    Review of Systems:   Review of Systems   Constitutional:  Negative for chills and fever.   HENT:  Negative for congestion and sore throat.    Eyes:  Negative for blurred vision and double vision.   Respiratory:   Positive for hemoptysis. Negative for cough, sputum production, shortness of breath, wheezing and stridor.    Cardiovascular:  Negative for chest pain, palpitations, orthopnea, claudication and leg swelling.   Gastrointestinal:  Positive for abdominal pain, nausea and vomiting. Negative for blood in stool, constipation, diarrhea, heartburn and melena.   Genitourinary:  Negative for dysuria, frequency and urgency.   Musculoskeletal:  Negative for back pain, myalgias and neck pain.   Skin:  Negative for itching and rash.   Neurological:  Positive for dizziness and headaches. Negative for tingling, sensory change, speech change and focal weakness.       Past Medical History:   Past Medical History was reviewed with patient.   has a past medical history of Hepatitis.    Past Surgical History:   Past Surgical History was reviewed with patient.   has no past surgical history on file.    Medications:   Medications have been reviewed with patient.  None        Allergies:   Allergies have been reviewed with patient.  No Known Allergies    Family History:   family history is not on file.     Social History:   Denies history of alcohol dependence    Primary Care Provider: reviewed Pcp Pt States None    Objective:  Physical Exam:  Temp:  [36.2 °C (97.2 °F)-36.8 °C (98.3 °F)] 36.2 °C (97.2 °F)  Pulse:  [70-86] 70  Resp:  [14-20] 18  BP: ()/(48-67) 94/48  SpO2:  [91 %-98 %] 97 %    Physical Exam  Constitutional:       General: She is not in acute distress.  HENT:      Head: Normocephalic and atraumatic.      Mouth/Throat:      Mouth: Mucous membranes are dry.   Eyes:      Extraocular Movements: Extraocular movements intact.      Pupils: Pupils are equal, round, and reactive to light.   Cardiovascular:      Rate and Rhythm: Normal rate and regular rhythm.      Heart sounds: No murmur heard.     No friction rub. No gallop.   Pulmonary:      Effort: Pulmonary effort is normal. No respiratory distress.      Breath sounds:  Normal breath sounds. No wheezing, rhonchi or rales.   Abdominal:      General: Abdomen is flat. Bowel sounds are normal. There is no distension.      Palpations: Abdomen is soft.      Tenderness: There is abdominal tenderness. There is no guarding or rebound.   Musculoskeletal:      Cervical back: Normal range of motion. No rigidity.   Skin:     General: Skin is warm and dry.   Neurological:      General: No focal deficit present.      Mental Status: She is alert and oriented to person, place, and time.   Psychiatric:         Mood and Affect: Mood normal.         Behavior: Behavior normal.         Thought Content: Thought content normal.         Labs:   Recent Labs     10/14/23  2116 10/15/23  0143   WBC 3.7* 2.5*   RBC 3.64* 3.51*   HEMOGLOBIN 9.7* 9.4*   HEMATOCRIT 30.8* 30.1*   MCV 84.6 85.8   MCH 26.6* 26.8*   RDW 43.8 44.1   PLATELETCT 57* 52*   MPV 11.0 10.9   NEUTSPOLYS 70.50 61.90   LYMPHOCYTES 13.60* 18.50*   MONOCYTES 10.00 11.60   EOSINOPHILS 5.10 7.20*   BASOPHILS 0.50 0.40     Recent Labs     10/14/23  2116   SODIUM 139   POTASSIUM 3.7   CHLORIDE 109   CO2 20   GLUCOSE 122*   BUN 9      Recent Labs     10/14/23  2116   ALBUMIN 4.1   TBILIRUBIN 0.7   ALKPHOSPHAT 83   TOTPROTEIN 6.6   ALTSGPT 33   ASTSGOT 29   CREATININE 0.39*        EKG: Normal Sinus Rhythm  HR: 81  QTC: 432  Clinical Impression: Normal Sinus Rhythm    Imaging:   DX-CHEST-PORTABLE (1 VIEW)   Final Result         No acute cardiac or pulmonary abnormality is identified.          Previous Data Review: reviewed    Assessment and Plan:  Mrs. Tineo is our 38 year old female patient with a past medical history significant for autoimmune hepatitis, esophageal banding for varices ~9 months ago, intermittent drug-induced lupus, who presents to the hospital 10/14 with chief complaint of hematemesis.    Hypotension  Assessment & Plan  -Patient with lightheadedness when standing, which resolved after IVF administration  -SBPs 90s to low  100s  -She endorses her blood pressures typically run low  -Outpatient is on Midodrine but has been nonadherent  -Continuing with Midodrine 2.5mg TID    Thrombocytopenia (HCC)  Assessment & Plan  -Platelets 52  -Transfuse platelets above to goal >50  -Plt TEG inhibited consider ddavp if concern for rebleed  -Thombocytopenia likely in the context of autoimmune liver disease    Autoimmune hepatitis (HCC)  Assessment & Plan  -Patient on immunosuppressants  -Esophageal banding for varices ~9 months ago most recently  -Typically on spironolactone, furosemide, rifaximin, lactulose outpatient; has been nonadherent; holding  -Outpatient follows with Dr. Ed Leonardo, based in [place]    Asymptomatic bacteriuria  Assessment & Plan  -UA with positive nitrite and LE  -Patient denies dysuria, frequency, urgency    Hematemesis  Assessment & Plan  -Hgb 9.7, Hct 30.8; MCV 84.6  -Patient believes her baseline Hgb is around 10-11  -NPO  -2 Large Bore IVs  -Continue with PRN IVF Hydration  -Continue with Zofran PRN  -Continue with Dilaudid PRN  -Continue with Protonix 40mg IV BID  -Continue with Octreotide gtt  -Continue with Ceftriaxone 1g 7 day course  -Pending INR to calculate MELD Score  -Follow up H&H ar 0400 to be followed by Q8hr  -Consult GI in the morning        Code Status: Full Code  Diet: NPO  DVT ppx: Contraindicated

## 2023-10-15 NOTE — ED NOTES
Pt ambulated to bathroom with steady gait. Educated on UA sample needed, pt verbalized understanding.

## 2023-10-15 NOTE — ED NOTES
Repeat troponin collected and sent.   Pt medicated per MAR, education provided, pt verbalized understanding.

## 2023-10-15 NOTE — ED NOTES
Pt transferred to S172/01 via liang with SMT RN. Pt A&Ox4, on 2L O2 NC, steady gait. Belongings within possession.

## 2023-10-15 NOTE — ANESTHESIA PREPROCEDURE EVALUATION
Case: [id number] Date/Time: 10/15/23 1045    Procedure: GASTROSCOPY (Esophagus)    Anesthesia type: MAC    Pre-op diagnosis: GI bleed    Location: [place] [address] / SURGERY [facility]    Surgeons: Demetri Coleman M.D.        Past Medical History:   Diagnosis Date   • Hepatitis      Lab Results   Component Value Date/Time    WBC 2.5 (L) 10/15/2023 01:43 AM    RBC 3.51 (L) 10/15/2023 01:43 AM    HEMOGLOBIN 9.4 (L) 10/15/2023 01:43 AM    HEMATOCRIT 30.1 (L) 10/15/2023 01:43 AM    MCV 85.8 10/15/2023 01:43 AM    MCH 26.8 (L) 10/15/2023 01:43 AM    MCHC 31.2 (L) 10/15/2023 01:43 AM    MPV 10.9 10/15/2023 01:43 AM    NEUTSPOLYS 61.90 10/15/2023 01:43 AM    LYMPHOCYTES 18.50 (L) 10/15/2023 01:43 AM    MONOCYTES 11.60 10/15/2023 01:43 AM    EOSINOPHILS 7.20 (H) 10/15/2023 01:43 AM    BASOPHILS 0.40 10/15/2023 01:43 AM      Lab Results   Component Value Date/Time    SODIUM 139 10/14/2023 09:16 PM    POTASSIUM 3.7 10/14/2023 09:16 PM    CHLORIDE 109 10/14/2023 09:16 PM    CO2 20 10/14/2023 09:16 PM    GLUCOSE 122 (H) 10/14/2023 09:16 PM    BUN 9 10/14/2023 09:16 PM    CREATININE 0.39 (L) 10/14/2023 09:16 PM      Vitals:    10/15/23 0720   BP: 95/60   Pulse: 70   Resp: 20   Temp: 36.9 °C (98.4 °F)   SpO2: 94%     No Known Allergies      Relevant Problems      (positive) Autoimmune hepatitis (HCC)      Other   (positive) Asymptomatic bacteriuria   (positive) GI bleed   (positive) Hematemesis   (positive) Hypotension   (positive) Thrombocytopenia (HCC)       Physical Exam    Airway   Mallampati: II  TM distance: >3 FB  Neck ROM: full       Cardiovascular - normal exam  Rhythm: regular  Rate: normal  (-) murmur     Dental - normal exam             Pulmonary - normal exam  Breath sounds clear to auscultation     Abdominal    Neurological - normal exam               Anesthesia Plan    ASA 3 (acute upper GI bleed)- EMERGENT   ASA physical status emergent criteria: acute hemorrhage    Plan - MAC               Induction:  intravenous          Informed Consent:    Anesthetic plan and risks discussed with patient.    Use of blood products discussed with: patient whom consented to blood products.       Anesthetic plan discussed with patient in detail. Plan for MAC with deep sedation and natural airway with oxygen supplementation. Risks discussed include but are not limited to awareness, aspiration, allergic reaction, need for ventilatory assistance including intubation, and cardiopulmonary problems up to and including death. All questions answered and patient fully consents to plan as described.

## 2023-10-15 NOTE — HOSPITAL COURSE
38 year old female patient with a past medical history significant for autoimmune hepatitis, esophageal banding for varices ~9 months ago, intermittent drug-induced lupus, who presents to the hospital 10/14 with chief complaint of hematemesis at 4pm and 7pm on day of presentation. Patient endorses she does not have regular follow up with her PCP, and has not taken any of her outpatient medications for at least 3 months. She is typically on spironolactone, furosemide, midodrine, rifaximin. She follows with Dr. Ed Leonardo in [place]. She developed recurrent headaches, for which she has been taking about 4 Ibuprofens per day for the past several weeks. Noted to be anemic to 9.7 and thrombocytopenic to 57. 2 large bore IV placed, started on IV protonix, octreotide, as well as ceftriaxone initially. Blood pressures persistently low which per her account was her baseline, restarted on midodrine. GI consulted following morning for endoscopy which showed gastric ulcers at the antrum representing likely bleeding source. Recommended oral PPI BID for 8 weeks and outpatient follow-up with GI. Also recommended obtaining AFP and abdominal ultrasound as well. Restarted on her diuretics at discharge after reconciling with pharmacy.    She notably did run a fever on 10/16, we obtained blood cultures which were negative for 24h at the time of discharge. She was not having respiratory or urinary symptoms and had a normal CXR that day. We obtained a procalcitonin which was low. Abdominal ultrasound obtained, there was not significant ascites so no sample was drawn to assess for SBP. Suspect fever and malaise likely attributable to some sort of viral illness, though COVID/flu/RSV nasal swab was negative. Do not suspect acute bacterial infection and patient was not septic, though her blood pressures had remained persistently borderline-low during this admission. She was advised on return precautions should any of her symptoms  worsen.

## 2023-10-15 NOTE — CONSULTS
Date of Consultation:  10/15/2023    Patient: : Tonie Tineo  MRN: [id number]    Referring Physician:  Dr. Hemanth John      GI:Demetri Coleman M.D.     Reason for Consultation:   GI bleeding     History of Present Illness:   The patient is a 38 years old female with medical history of autoimmune hepatitis, chronic liver disease, cirrhosis currently on lactulose and spironolactone, previous GI bleeding from esophageal varices status post the last variceal banding about 9 months ago at outside hospital, was in a discussion of transplantation in the last 3 years, however not on the list yet.  GI team is consulted in the early morning for GI bleeding.    Per chart review under the information from the patient, she has been off any liver medication for a while due to access issue and also changing provider situation.    Had 2 hematemesis within 24 hours, seems slowing down.  Some discomfort or tenderness in the epigastric area however not getting worse overnight.  Denies any bowel movement overnight.  Last upper GI scope was 9 months ago last colonoscopy about 2 to 3 years ago which was remarkable for the patient.    We saw the patient in the early morning, she was sleeping in the bed.  As there was no distress of respiratory and then no abdominal pain when she wakes up.  Stable vitals with lower blood pressure  Which has been her baseline.    No overt fluid overload in the lower limbs, mild tenderness from the abdomen but no acute abdomen.    Past Medical History:   Diagnosis Date    Hepatitis          No past surgical history on file.    No family history on file.    Social History     Socioeconomic History    Marital status:    Tobacco Use    Smoking status: Former     Types: Cigarettes    Smokeless tobacco: Never   Vaping Use    Vaping Use: Never used   Substance and Sexual Activity    Alcohol use: Not Currently    Drug use: Not Currently     Comment: h/o cocaine, and opiods           Physical Exam:  Vitals:     10/15/23 0258 10/15/23 0313 10/15/23 0600 10/15/23 0720   BP:  94/48  95/60   Pulse:    70   Resp:  18  20   Temp:  36.2 °C (97.2 °F)  36.9 °C (98.4 °F)   TempSrc:  Temporal  Temporal   SpO2:  97%  94%   Weight: 73.5 kg (162 lb 0.6 oz)  73.5 kg (162 lb 0.6 oz)    Height:           Constitutional: No fevers.  Eyes: No symptoms reported.   Ears/Nose/Throat/Mouth: No choking reported.  Cardiovascular: No chest pain reported.   Respiratory: Denies shortness of breath.  Gastrointestinal/Hepatic: Per H/P.   Skin/Breast: No new rash reported.   Psychiatric: No complaints    HEENT: grossly normal.  Cardiovascular: Normal heart rate.  Lungs: Respiratory effort is normal.   Abdomen: Soft, No tenderness.  Skin: No erythema, No rash.  Lower limbs: normal, no pitting edema.   Neurologic: Alert & oriented x 3, Normal motor function, No focal deficits noted.  PSY: stable mood.         Labs:  Recent Labs     10/14/23  2116 10/15/23  0143   WBC 3.7* 2.5*   RBC 3.64* 3.51*   HEMOGLOBIN 9.7* 9.4*   HEMATOCRIT 30.8* 30.1*   MCV 84.6 85.8   MCH 26.6* 26.8*   MCHC 31.5* 31.2*   RDW 43.8 44.1   PLATELETCT 57* 52*   MPV 11.0 10.9     Recent Labs     10/14/23  2116   SODIUM 139   POTASSIUM 3.7   CHLORIDE 109   CO2 20   GLUCOSE 122*   BUN 9     Recent Labs     10/14/23  2116   INR 1.61*       Recent Labs     10/14/23  2116   ASTSGOT 29   ALTSGPT 33   TBILIRUBIN 0.7   ALKPHOSPHAT 83   GLOBULIN 2.5   INR 1.61*         Imaging:  DX-CHEST-PORTABLE (1 VIEW)  Narrative: 10/14/2023 9:23 PM    HISTORY/REASON FOR EXAM:  Chest Pain    TECHNIQUE/EXAM DESCRIPTION AND NUMBER OF VIEWS:  Single portable view of the chest.    COMPARISON: None    FINDINGS:    Heart size is within normal limits.  No focal infiltrates or consolidations are identified in the lungs.  No pleural fluid collections are identified.  No pneumothorax is appreciated.  Impression: No acute cardiac or pulmonary abnormality is identified.            Impressions:  38 years old female  with medical history of autoimmune hepatitis off medication, cirrhosis with variceal bleeding and fluid accumulation, s/p EGD variceal banding, off medication for at least 3 months due to change in provider situation, presented to GI for GI bleeding.    Hematemesis, suggesting upper GI bleeding, however not typical presentation of variceal bleeding at this time.  Hemoglobin relatively stable around 9-10.  No more vomiting blood or bloody output from lower GI since admission.    GI team agree with current medication from her primary team; we are going to offer upper GI scope today in the morning.  Please continue n.p.o., PPI, IV fluid, octreotide and antibiotic for cirrhotic bleeding.  If brisk bleeding is noted please call us back for reevaluation.    Diagnosis: upper gastrointestinal bleeding.   Procedure: Esophagogastroduodenoscopy with bleeding control including banding.         This note was generated using voice recognition software which has a small chance of producing errors of grammar and possibly content. I have made every reasonable attempt to find and correct any obvious errors, but expect that some may not be found prior to finalization of this note.

## 2023-10-15 NOTE — ASSESSMENT & PLAN NOTE
Patient with lightheadedness when standing on admission, which resolved after IVF administration  -SBPs 90s to low 100s  -She endorses her blood pressures typically run low  -Outpatient is on Midodrine but has been nonadherent  -Resuming Midodrine 2.5mg TID

## 2023-10-15 NOTE — CARE PLAN
The patient is Watcher - Medium risk of patient condition declining or worsening    Shift Goals  Clinical Goals: Patient will remain free from falls this shift    Progress made toward(s) clinical / shift goals:  Patient is alert and oriented, pleasant, understands education, asks appropriate questions, not fall risk, nonslip socks in place,   Problem: Knowledge Deficit - Standard  Goal: Patient and family/care givers will demonstrate understanding of plan of care, disease process/condition, diagnostic tests and medications  Outcome: Progressing       Patient is not progressing towards the following goals:

## 2023-10-15 NOTE — OR NURSING
Pt A&OX4, drowsy. VSS. Pt on 3 L of oxygen via nasal cannula. Afebrile. Pt has no complaints of pain or nausea while in pacu. Octreotide gtt continued per MAR. Report called to SALVATORE Banks. Pt on tele box, monitor tech notified. Pt out of PACU via bed with this RN and WANDER Parsons.

## 2023-10-15 NOTE — PROGRESS NOTES
4 Eyes Skin Assessment Completed by SALVATORE Jefferson and SALVATORE Arreola.    Head WDL  Ears WDL  Nose WDL  Mouth WDL  Neck WDL  Breast/Chest WDL  Shoulder Blades WDL  Spine WDL  (R) Arm/Elbow/Hand WDL  (L) Arm/Elbow/Hand WDL  Abdomen WDL  Groin WDL  Scrotum/Coccyx/Buttocks WDL  (R) Leg WDL  (L) Leg WDL  (R) Heel/Foot/Toe WDL  (L) Heel/Foot/Toe WDL          Devices In Places Nasal Cannula      Interventions In Place N/A    Possible Skin Injury No    Pictures Uploaded Into Epic N/A  Wound Consult Placed N/A  RN Wound Prevention Protocol Ordered Yes

## 2023-10-15 NOTE — ANESTHESIA POSTPROCEDURE EVALUATION
Patient: Tonie Tineo    Procedure Summary     Date: 10/15/23 Room / Location: [facility] 06 / SURGERY [facility]    Anesthesia Start: 1042 Anesthesia Stop: 1102    Procedures:       GASTROSCOPY (Esophagus)      GASTROSCOPY, WITH BIOPSY (Esophagus) Diagnosis: (gastric ulcers, portal hypertensive gastropathy)    Surgeons: Demetri Coleman M.D. Responsible Provider: Fuad Montes M.D.    Anesthesia Type: MAC ASA Status: 3 - Emergent          Final Anesthesia Type: MAC  Last vitals  BP   Blood Pressure: 92/61    Temp   36.7 °C (98.1 °F)    Pulse   64   Resp   18    SpO2   99 %      Anesthesia Post Evaluation    Patient location during evaluation: PACU  Patient participation: complete - patient participated  Level of consciousness: sleepy but conscious  Pain score: 4    Airway patency: patent  Anesthetic complications: no  Cardiovascular status: hemodynamically stable  Respiratory status: acceptable  Hydration status: balanced    PONV: none          There were no known notable events for this encounter.     Nurse Pain Score: 7 (NPRS)

## 2023-10-15 NOTE — ED PROVIDER NOTES
ED Provider Note    CHIEF COMPLAINT  Chief Complaint   Patient presents with    Blood in Vomit     Nauseas since 1600, pt vomited bright red blood at 1900 x1 episode. H/o varicies. Epigastric pain 7/10, burning per pt. -thinners   Denies SOB. Denies cardiac h/o.        EXTERNAL RECORDS REVIEWED  None available    HPI/ROS  LIMITATION TO HISTORY   None  OUTSIDE HISTORIAN(S):  None    Tonie Tineo is a 38 y.o. female who presents for evaluation of epigastric discomfort nausea and some hematemesis.  The patient just recently moved from [place].  She reportedly has a history of autoimmune hepatitis.  She does not report a history of any alcohol abuse or history of alcoholism but she has apparently had varices banded around 9 months ago and previously several other times.  She is on immune suppressants.  She has been evaluated by the transplant team but is not on the transplant list.  She reports she came to [place] for a failed relationship and now is stuck here.  She has been off her meds.  She reports no black or bloody stools recently.  She reports epigastric discomfort with blood-tinged hematemesis.  No report of any high fevers or chills    PAST MEDICAL HISTORY   has a past medical history of Hepatitis.    SURGICAL HISTORY  patient denies any surgical history  History of EGD with banding  FAMILY HISTORY  No family history on file.    SOCIAL HISTORY  Social History     Tobacco Use    Smoking status: Former     Types: Cigarettes    Smokeless tobacco: Never   Vaping Use    Vaping Use: Never used   Substance and Sexual Activity    Alcohol use: Not Currently    Drug use: Not Currently     Comment: h/o cocaine, and opiods    Sexual activity: Not on file       CURRENT MEDICATIONS  Home Medications    **Home medications have not yet been reviewed for this encounter**     No active medications    ALLERGIES  No Known Allergies    PHYSICAL EXAM  VITAL SIGNS: /58   Pulse 82   Temp 36.8 °C (98.3 °F) (Temporal)    "Resp 14   Ht 1.727 m (5' 8\")   Wt 72.6 kg (160 lb)   SpO2 98%   BMI 24.33 kg/m²    Pulse ox interpretation: I interpret this pulse ox as normal.  Constitutional: Alert and oriented x 3, mild distress  HEENT: Atraumatic normocephalic, pale conjunctive a pupils are equal round reactive to light extraocular movements are intact. The nares is clear, external ears are normal, mouth shows moist mucous membranes normal dentition for age  Neck: Supple, no JVD no tracheal deviation  Cardiovascular: Regular rate and rhythm no murmur rub or gallop 2+ pulses peripherally x4  Thorax & Lungs: No respiratory distress, no wheezes rales or rhonchi, No chest tenderness.   GI: Soft mild epigastric discomfort no rebound or guarding   skin: Warm dry no acute rash or lesion  Musculoskeletal: Moving all extremities with full range and 5 of 5 strength no acute  deformity  Neurologic: Cranial nerves III through XII are grossly intact no sensory deficit no cerebellar dysfunction   Psychiatric: Anxious          DIAGNOSTIC STUDIES / PROCEDURES    LABS  Results for orders placed or performed during the hospital encounter of 10/14/23   COMP METABOLIC PANEL   Result Value Ref Range    Sodium 139 135 - 145 mmol/L    Potassium 3.7 3.6 - 5.5 mmol/L    Chloride 109 96 - 112 mmol/L    Co2 20 20 - 33 mmol/L    Anion Gap 10.0 7.0 - 16.0    Glucose 122 (H) 65 - 99 mg/dL    Bun 9 8 - 22 mg/dL    Creatinine 0.39 (L) 0.50 - 1.40 mg/dL    Calcium 9.1 8.5 - 10.5 mg/dL    Correct Calcium 9.0 8.5 - 10.5 mg/dL    AST(SGOT) 29 12 - 45 U/L    ALT(SGPT) 33 2 - 50 U/L    Alkaline Phosphatase 83 30 - 99 U/L    Total Bilirubin 0.7 0.1 - 1.5 mg/dL    Albumin 4.1 3.2 - 4.9 g/dL    Total Protein 6.6 6.0 - 8.2 g/dL    Globulin 2.5 1.9 - 3.5 g/dL    A-G Ratio 1.6 g/dL   LIPASE   Result Value Ref Range    Lipase 30 11 - 82 U/L   URINALYSIS    Specimen: Urine   Result Value Ref Range    Color Yellow     Character Clear     Specific Gravity 1.017 <1.035    Ph 6.5 5.0 - " 8.0    Glucose Negative Negative mg/dL    Ketones Negative Negative mg/dL    Protein Negative Negative mg/dL    Bilirubin Negative Negative    Urobilinogen, Urine 1.0 Negative    Nitrite Positive (A) Negative    Leukocyte Esterase Trace (A) Negative    Occult Blood Negative Negative    Micro Urine Req Microscopic    CBC with Differential   Result Value Ref Range    WBC 3.7 (L) 4.8 - 10.8 K/uL    RBC 3.64 (L) 4.20 - 5.40 M/uL    Hemoglobin 9.7 (L) 12.0 - 16.0 g/dL    Hematocrit 30.8 (L) 37.0 - 47.0 %    MCV 84.6 81.4 - 97.8 fL    MCH 26.6 (L) 27.0 - 33.0 pg    MCHC 31.5 (L) 32.2 - 35.5 g/dL    RDW 43.8 35.9 - 50.0 fL    Platelet Count 57 (L) 164 - 446 K/uL    MPV 11.0 9.0 - 12.9 fL    Neutrophils-Polys 70.50 44.00 - 72.00 %    Lymphocytes 13.60 (L) 22.00 - 41.00 %    Monocytes 10.00 0.00 - 13.40 %    Eosinophils 5.10 0.00 - 6.90 %    Basophils 0.50 0.00 - 1.80 %    Immature Granulocytes 0.30 0.00 - 0.90 %    Nucleated RBC 0.00 0.00 - 0.20 /100 WBC    Neutrophils (Absolute) 2.60 1.82 - 7.42 K/uL    Lymphs (Absolute) 0.50 (L) 1.00 - 4.80 K/uL    Monos (Absolute) 0.37 0.00 - 0.85 K/uL    Eos (Absolute) 0.19 0.00 - 0.51 K/uL    Baso (Absolute) 0.02 0.00 - 0.12 K/uL    Immature Granulocytes (abs) 0.01 0.00 - 0.11 K/uL    NRBC (Absolute) 0.00 K/uL   Troponins NOW   Result Value Ref Range    Troponin T <6 6 - 19 ng/L   HCG QUAL SERUM   Result Value Ref Range    Beta-Hcg Qualitative Serum Negative Negative   ESTIMATED GFR   Result Value Ref Range    GFR (CKD-EPI) 130 >60 mL/min/1.73 m 2   IMMATURE PLT FRACTION   Result Value Ref Range    Imm. Plt Fraction 3.4 0.6 - 13.1 %   URINE MICROSCOPIC (W/UA)   Result Value Ref Range    WBC 5-10 (A) /hpf    RBC 2-5 (A) /hpf    Bacteria Many (A) None /hpf    Epithelial Cells Negative /hpf    Hyaline Cast 0-2 /lpf   EKG   Result Value Ref Range    Report       [facility] Emergency Dept.    Test Date:  2023-10-14  Pt Name:    RADHA STRINGER            Department:  ER  MRN:        [id number]                      Room:       RD 01  Gender:     Male                         Technician: [id number]  :        1985                   Requested By:ER TRIAGE PROTOCOL  Order #:    [id number]                    Reading MD:    Measurements  Intervals                                Axis  Rate:       81                           P:          55  WY:         137                          QRS:        44  QRSD:       88                           T:          56  QT:         372  QTc:        432    Interpretive Statements  Sinus rhythm  No previous ECG available for comparison        EKG twelve-lead interpretation by me rate 81 sinus rhythm no acute ST segment elevation or depression no biological T wave version no ectopy intervals and axis are normal no suggestion of arrhythmia heart block or ischemia  RADIOLOGY  I have independently interpreted the diagnostic imaging associated with this visit and am waiting the final reading from the radiologist.   My preliminary interpretation is as follows: No evidence of free air or cardiomegaly or heart failure  Radiologist interpretation:   DX-CHEST-PORTABLE (1 VIEW)   Final Result         No acute cardiac or pulmonary abnormality is identified.          COURSE & MEDICAL DECISION MAKING    ED Observation Status? No; Patient does not meet criteria for ED Observation.     INITIAL ASSESSMENT, COURSE AND PLAN  Care Narrative:     This is a very concerning 38-year-old female who has no medical history here but carries a reported diagnosis of autoimmune hepatitis and history of chronic liver failure with varices.  She does not endorse any alcohol use.  She reports epigastric discomfort with hematemesis.  A large-bore IV was established.  Here her hemoglobin is low at 9.7 she has low platelets.  The rest of her laboratory studies including LFTs are normal.  I will add on an INR.  An IV is established she was given some nausea meds pain meds Pepcid as well as  Protonix.  With her history of varices we will administer octreotide.  This is a potentially high risk situation.  We do not consult gastroenterology emergently at night but they will be consulted in the morning for possible EGD and banding.  She will be admitted to the hospitalist service for further evaluation      ADDITIONAL PROBLEM LIST    DISPOSITION AND DISCUSSIONS  I have discussed management of the patient with the following physicians and AMADEO's: Discussed with admitting hospitalist    Discussion of management with other QHP or appropriate source(s): None    Escalation of care considered, and ultimately not performed: Considered ICU admission but patient appears to be stable    Barriers to care at this time, including but not limited to: None    Decision tools and prescription drugs considered including, but not limited to: None    FINAL DIAGNOSIS  Upper GI bleed  History of esophageal varices       Electronically signed by: Benjamín Mcgrath M.D., 10/14/2023 11:16 PM

## 2023-10-15 NOTE — ASSESSMENT & PLAN NOTE
Initial platelets 52k  -Transfuse platelets above to goal >50 in active bleeding, otherwise lower transfusion target  -Plt TEG inhibited consider ddavp if concern for rebleed  -Thombocytopenia likely in the context of autoimmune liver disease

## 2023-10-16 ENCOUNTER — APPOINTMENT (OUTPATIENT)
Dept: RADIOLOGY | Facility: MEDICAL CENTER | Age: 38
End: 2023-10-16
Payer: MEDICARE

## 2023-10-16 PROBLEM — D72.810 LYMPHOPENIA: Status: ACTIVE | Noted: 2023-10-16

## 2023-10-16 PROBLEM — K25.9 MULTIPLE GASTRIC ULCERS: Status: ACTIVE | Noted: 2023-10-15

## 2023-10-16 PROBLEM — R82.71 ASYMPTOMATIC BACTERIURIA: Status: RESOLVED | Noted: 2023-10-15 | Resolved: 2023-10-16

## 2023-10-16 PROBLEM — R50.9 FEVER: Status: ACTIVE | Noted: 2023-10-16

## 2023-10-16 PROBLEM — K92.2 GI BLEED: Status: RESOLVED | Noted: 2023-10-14 | Resolved: 2023-10-16

## 2023-10-16 PROBLEM — K92.0 HEMATEMESIS: Status: RESOLVED | Noted: 2023-10-15 | Resolved: 2023-10-16

## 2023-10-16 LAB
ALBUMIN SERPL BCP-MCNC: 3.4 G/DL (ref 3.2–4.9)
ALBUMIN/GLOB SERPL: 1.5 G/DL
ALP SERPL-CCNC: 66 U/L (ref 30–99)
ALT SERPL-CCNC: 38 U/L (ref 2–50)
ANION GAP SERPL CALC-SCNC: 8 MMOL/L (ref 7–16)
AST SERPL-CCNC: 35 U/L (ref 12–45)
BASOPHILS # BLD AUTO: 0.5 % (ref 0–1.8)
BASOPHILS # BLD: 0.02 K/UL (ref 0–0.12)
BILIRUB SERPL-MCNC: 1.1 MG/DL (ref 0.1–1.5)
BUN SERPL-MCNC: 9 MG/DL (ref 8–22)
CALCIUM ALBUM COR SERPL-MCNC: 8.9 MG/DL (ref 8.5–10.5)
CALCIUM SERPL-MCNC: 8.4 MG/DL (ref 8.5–10.5)
CHLORIDE SERPL-SCNC: 107 MMOL/L (ref 96–112)
CO2 SERPL-SCNC: 25 MMOL/L (ref 20–33)
CREAT SERPL-MCNC: 0.68 MG/DL (ref 0.5–1.4)
EOSINOPHIL # BLD AUTO: 0.2 K/UL (ref 0–0.51)
EOSINOPHIL NFR BLD: 5.2 % (ref 0–6.9)
ERYTHROCYTE [DISTWIDTH] IN BLOOD BY AUTOMATED COUNT: 45.6 FL (ref 35.9–50)
FLUAV RNA SPEC QL NAA+PROBE: NEGATIVE
FLUBV RNA SPEC QL NAA+PROBE: NEGATIVE
GFR SERPLBLD CREATININE-BSD FMLA CKD-EPI: 113 ML/MIN/1.73 M 2
GLOBULIN SER CALC-MCNC: 2.3 G/DL (ref 1.9–3.5)
GLUCOSE SERPL-MCNC: 128 MG/DL (ref 65–99)
HCT VFR BLD AUTO: 30.8 % (ref 37–47)
HGB BLD-MCNC: 9.2 G/DL (ref 12–16)
IMM GRANULOCYTES # BLD AUTO: 0.01 K/UL (ref 0–0.11)
IMM GRANULOCYTES NFR BLD AUTO: 0.3 % (ref 0–0.9)
LYMPHOCYTES # BLD AUTO: 0.55 K/UL (ref 1–4.8)
LYMPHOCYTES NFR BLD: 14.4 % (ref 22–41)
MCH RBC QN AUTO: 25.9 PG (ref 27–33)
MCHC RBC AUTO-ENTMCNC: 29.9 G/DL (ref 32.2–35.5)
MCV RBC AUTO: 86.8 FL (ref 81.4–97.8)
MONOCYTES # BLD AUTO: 0.43 K/UL (ref 0–0.85)
MONOCYTES NFR BLD AUTO: 11.3 % (ref 0–13.4)
NEUTROPHILS # BLD AUTO: 2.6 K/UL (ref 1.82–7.42)
NEUTROPHILS NFR BLD: 68.3 % (ref 44–72)
NRBC # BLD AUTO: 0 K/UL
NRBC BLD-RTO: 0 /100 WBC (ref 0–0.2)
PATHOLOGY CONSULT NOTE: NORMAL
PLATELET # BLD AUTO: 55 K/UL (ref 164–446)
PLATELETS.RETICULATED NFR BLD AUTO: 3.9 % (ref 0.6–13.1)
PMV BLD AUTO: 10.6 FL (ref 9–12.9)
POTASSIUM SERPL-SCNC: 4.3 MMOL/L (ref 3.6–5.5)
PROT SERPL-MCNC: 5.7 G/DL (ref 6–8.2)
RBC # BLD AUTO: 3.55 M/UL (ref 4.2–5.4)
RSV RNA SPEC QL NAA+PROBE: NEGATIVE
SARS-COV-2 RNA RESP QL NAA+PROBE: NOTDETECTED
SODIUM SERPL-SCNC: 140 MMOL/L (ref 135–145)
SPECIMEN SOURCE: NORMAL
WBC # BLD AUTO: 3.8 K/UL (ref 4.8–10.8)

## 2023-10-16 PROCEDURE — 700111 HCHG RX REV CODE 636 W/ 250 OVERRIDE (IP): Mod: UD | Performed by: STUDENT IN AN ORGANIZED HEALTH CARE EDUCATION/TRAINING PROGRAM

## 2023-10-16 PROCEDURE — 0241U HCHG SARS-COV-2 COVID-19 NFCT DS RESP RNA 4 TRGT MIC: CPT

## 2023-10-16 PROCEDURE — C9803 HOPD COVID-19 SPEC COLLECT: HCPCS

## 2023-10-16 PROCEDURE — 85025 COMPLETE CBC W/AUTO DIFF WBC: CPT

## 2023-10-16 PROCEDURE — 99232 SBSQ HOSP IP/OBS MODERATE 35: CPT | Mod: GC

## 2023-10-16 PROCEDURE — 82105 ALPHA-FETOPROTEIN SERUM: CPT

## 2023-10-16 PROCEDURE — G0378 HOSPITAL OBSERVATION PER HR: HCPCS

## 2023-10-16 PROCEDURE — 96376 TX/PRO/DX INJ SAME DRUG ADON: CPT

## 2023-10-16 PROCEDURE — 80053 COMPREHEN METABOLIC PANEL: CPT

## 2023-10-16 PROCEDURE — 85055 RETICULATED PLATELET ASSAY: CPT

## 2023-10-16 PROCEDURE — 87040 BLOOD CULTURE FOR BACTERIA: CPT

## 2023-10-16 PROCEDURE — 700102 HCHG RX REV CODE 250 W/ 637 OVERRIDE(OP): Mod: UD

## 2023-10-16 PROCEDURE — 71046 X-RAY EXAM CHEST 2 VIEWS: CPT

## 2023-10-16 PROCEDURE — A9270 NON-COVERED ITEM OR SERVICE: HCPCS | Mod: UD

## 2023-10-16 PROCEDURE — 99232 SBSQ HOSP IP/OBS MODERATE 35: CPT | Performed by: NURSE PRACTITIONER

## 2023-10-16 PROCEDURE — 700102 HCHG RX REV CODE 250 W/ 637 OVERRIDE(OP): Mod: UD | Performed by: STUDENT IN AN ORGANIZED HEALTH CARE EDUCATION/TRAINING PROGRAM

## 2023-10-16 PROCEDURE — A9270 NON-COVERED ITEM OR SERVICE: HCPCS | Mod: UD | Performed by: STUDENT IN AN ORGANIZED HEALTH CARE EDUCATION/TRAINING PROGRAM

## 2023-10-16 RX ORDER — MIDODRINE HYDROCHLORIDE 2.5 MG/1
2.5 TABLET ORAL
Qty: 60 TABLET | Refills: 0 | Status: SHIPPED | OUTPATIENT
Start: 2023-10-16 | End: 2023-10-17 | Stop reason: SDUPTHER

## 2023-10-16 RX ORDER — ACETAMINOPHEN 325 MG/1
650 TABLET ORAL ONCE
Status: COMPLETED | OUTPATIENT
Start: 2023-10-16 | End: 2023-10-16

## 2023-10-16 RX ADMIN — HYDROMORPHONE HYDROCHLORIDE 0.5 MG: 1 INJECTION, SOLUTION INTRAMUSCULAR; INTRAVENOUS; SUBCUTANEOUS at 20:50

## 2023-10-16 RX ADMIN — HYDROMORPHONE HYDROCHLORIDE 0.5 MG: 1 INJECTION, SOLUTION INTRAMUSCULAR; INTRAVENOUS; SUBCUTANEOUS at 12:33

## 2023-10-16 RX ADMIN — HYDROMORPHONE HYDROCHLORIDE 0.5 MG: 1 INJECTION, SOLUTION INTRAMUSCULAR; INTRAVENOUS; SUBCUTANEOUS at 02:46

## 2023-10-16 RX ADMIN — ACETAMINOPHEN 650 MG: 325 TABLET, FILM COATED ORAL at 11:56

## 2023-10-16 RX ADMIN — MIDODRINE HYDROCHLORIDE 2.5 MG: 5 TABLET ORAL at 18:28

## 2023-10-16 RX ADMIN — OMEPRAZOLE 40 MG: 20 CAPSULE, DELAYED RELEASE ORAL at 06:20

## 2023-10-16 RX ADMIN — MIDODRINE HYDROCHLORIDE 2.5 MG: 5 TABLET ORAL at 11:56

## 2023-10-16 RX ADMIN — HYDROMORPHONE HYDROCHLORIDE 0.5 MG: 1 INJECTION, SOLUTION INTRAMUSCULAR; INTRAVENOUS; SUBCUTANEOUS at 16:13

## 2023-10-16 RX ADMIN — OMEPRAZOLE 40 MG: 20 CAPSULE, DELAYED RELEASE ORAL at 23:45

## 2023-10-16 RX ADMIN — HYDROMORPHONE HYDROCHLORIDE 0.5 MG: 1 INJECTION, SOLUTION INTRAMUSCULAR; INTRAVENOUS; SUBCUTANEOUS at 06:20

## 2023-10-16 RX ADMIN — MIDODRINE HYDROCHLORIDE 2.5 MG: 5 TABLET ORAL at 08:22

## 2023-10-16 ASSESSMENT — ENCOUNTER SYMPTOMS
FALLS: 0
SHORTNESS OF BREATH: 0
FEVER: 1
HEMOPTYSIS: 0
SORE THROAT: 0
NAUSEA: 1
HEADACHES: 1
ABDOMINAL PAIN: 1
MYALGIAS: 1
SINUS PAIN: 0
FLANK PAIN: 0
BLURRED VISION: 0
SPUTUM PRODUCTION: 0
CHILLS: 1
FOCAL WEAKNESS: 0
DIAPHORESIS: 1
WEAKNESS: 1
BLOOD IN STOOL: 0
WHEEZING: 0
DIARRHEA: 0
PALPITATIONS: 0
SENSORY CHANGE: 0
COUGH: 1
TREMORS: 0
ORTHOPNEA: 0
CLAUDICATION: 0
VOMITING: 0
CONSTIPATION: 0
TINGLING: 0
NERVOUS/ANXIOUS: 0
DIZZINESS: 1
INSOMNIA: 0
SPEECH CHANGE: 0

## 2023-10-16 ASSESSMENT — PAIN DESCRIPTION - PAIN TYPE
TYPE: ACUTE PAIN

## 2023-10-16 ASSESSMENT — PATIENT HEALTH QUESTIONNAIRE - PHQ9
2. FEELING DOWN, DEPRESSED, IRRITABLE, OR HOPELESS: NOT AT ALL
1. LITTLE INTEREST OR PLEASURE IN DOING THINGS: NOT AT ALL
SUM OF ALL RESPONSES TO PHQ9 QUESTIONS 1 AND 2: 0

## 2023-10-16 ASSESSMENT — FIBROSIS 4 INDEX: FIB4 SCORE: 4.03

## 2023-10-16 NOTE — PROGRESS NOTES
[facility] Internal Medicine Daily Progress Note    Date of Service  10/16/2023    R Team: R IM Orange Team   Attending: Hemanth John M.d.  Senior Resident: Dr. Bolden  Contact Number: [phone]    Chief Complaint  Tonie Tineo is a 38 y.o. female admitted 10/14/2023 with hematemesis.     Hospital Course  38 year old female patient with a past medical history significant for autoimmune hepatitis, esophageal banding for varices ~9 months ago, intermittent drug-induced lupus, who presents to the hospital 10/14 with chief complaint of hematemesis at 4pm and 7pm on day of presentation. Patient endorses she does not have regular follow up with her PCP, and has not taken any of her outpatient medications for at least 3 months. She is typically on spironolactone, furosemide, midodrine, rifaximin, lactulose. She follows with Dr. Ed Leonardo in [place]. Consequently developed recurrent migraines, for which she has been taking about 4 Ibuprofens per day for the past several weeks. Noted to be anemic to 9.7 and thrombocytopenic to 57. 2 large bore IV placed, started on IV protonix and octreotide. Blood pressures persistently low which per her account was her baseline, restarted on midodrine. GI consulted following morning for endoscopy which showed gastric ulcers at the antrum representing likely bleeding source. Recommended oral PPI BID for 8 weeks and outpatient follow-up with GI    Interval Problem Update  NAEON, blood pressure borderline hypotensive as previously. Patient reporting feeling generally uncomfortable, mainly with headache but also with chills and sweats this morning. Patient had fever to 101.8F later in the morning.     I have discussed this patient's plan of care and discharge plan at IDT rounds today with Case Management, Nursing, Nursing leadership, and other members of the IDT team.    Consultants/Specialty  GI    Code Status  Full Code    Disposition  The patient is not medically cleared for  discharge to home or a post-acute facility.  Anticipate discharge to: home with close outpatient follow-up    I have placed the appropriate orders for post-discharge needs.    Review of Systems  Review of Systems   Constitutional:  Positive for chills, diaphoresis, fever and malaise/fatigue.   HENT:  Negative for sinus pain and sore throat.    Eyes:  Negative for blurred vision.   Respiratory:  Positive for cough. Negative for hemoptysis, sputum production, shortness of breath and wheezing.    Cardiovascular:  Negative for chest pain, palpitations, orthopnea, claudication and leg swelling.   Gastrointestinal:  Positive for abdominal pain and nausea. Negative for blood in stool, constipation, diarrhea and vomiting.   Genitourinary:  Negative for dysuria, frequency and urgency.   Skin:  Negative for rash.   Neurological:  Positive for dizziness and headaches. Negative for tingling, tremors, sensory change, speech change and focal weakness.        Physical Exam  Temp:  [36.8 °C (98.3 °F)-38.8 °C (101.8 °F)] 38.8 °C (101.8 °F)  Pulse:  [62-99] 99  Resp:  [16-20] 20  BP: ()/(51-71) 100/57  SpO2:  [92 %-100 %] 92 %    Physical Exam  Constitutional:       General: She is not in acute distress.     Appearance: Normal appearance. She is ill-appearing. She is not toxic-appearing or diaphoretic.   HENT:      Head: Normocephalic and atraumatic.      Mouth/Throat:      Mouth: Mucous membranes are moist.      Pharynx: Oropharynx is clear. No oropharyngeal exudate or posterior oropharyngeal erythema.   Eyes:      General: No scleral icterus.        Right eye: No discharge.         Left eye: No discharge.      Extraocular Movements: Extraocular movements intact.      Conjunctiva/sclera: Conjunctivae normal.   Cardiovascular:      Rate and Rhythm: Normal rate and regular rhythm.      Pulses: Normal pulses.      Heart sounds: No murmur heard.  Pulmonary:      Effort: Pulmonary effort is normal. No respiratory distress.       Breath sounds: Normal breath sounds. No wheezing.   Abdominal:      General: Abdomen is flat. Bowel sounds are normal.      Palpations: Abdomen is soft.      Tenderness: There is abdominal tenderness.      Comments: Diffuse abdominal tenderness   Musculoskeletal:         General: Normal range of motion.      Cervical back: Neck supple. No tenderness.      Right lower leg: No edema.      Left lower leg: No edema.   Skin:     General: Skin is warm and dry.      Capillary Refill: Capillary refill takes less than 2 seconds.      Coloration: Skin is not jaundiced.   Neurological:      General: No focal deficit present.      Mental Status: She is alert and oriented to person, place, and time.   Psychiatric:         Mood and Affect: Mood normal.         Behavior: Behavior normal.         Thought Content: Thought content normal.         Judgment: Judgment normal.         Fluids    Intake/Output Summary (Last 24 hours) at 10/16/2023 1537  Last data filed at 10/16/2023 1200  Gross per 24 hour   Intake 180 ml   Output --   Net 180 ml       Laboratory  Recent Labs     10/15/23  1219 10/15/23  1455 10/16/23  0041   WBC 2.4* 2.8* 3.8*   RBC 3.60* 3.69* 3.55*   HEMOGLOBIN 9.4* 9.7* 9.2*   HEMATOCRIT 31.4* 31.7* 30.8*   MCV 87.2 85.9 86.8   MCH 26.1* 26.3* 25.9*   MCHC 29.9* 30.6* 29.9*   RDW 45.5 44.2 45.6   PLATELETCT 52* 53* 55*   MPV 10.5 10.8 10.6     Recent Labs     10/14/23  2116 10/16/23  0041   SODIUM 139 140   POTASSIUM 3.7 4.3   CHLORIDE 109 107   CO2 20 25   GLUCOSE 122* 128*   BUN 9 9   CREATININE 0.39* 0.68   CALCIUM 9.1 8.4*     Recent Labs     10/14/23  2116   INR 1.61*               Imaging  DX-CHEST-PORTABLE (1 VIEW)   Final Result         No acute cardiac or pulmonary abnormality is identified.      US-ABDOMEN COMPLETE SURVEY    (Results Pending)   DX-CHEST-2 VIEWS    (Results Pending)        Assessment/Plan  Problem Representation:    * Multiple gastric ulcers  Assessment & Plan  Presented with hematemesis x2,  history of variceal bleeding. Initial Hgb 9.7, Hct 30.8; MCV 84.6. Patient believes her baseline Hgb is around 10-11. Started initially on IV octreotide, PPI, and ceftriaxone prophylaxis. No further active bleeding since admission. H+H flat-trending.   -GI consulted, upper endoscopy 10/15 showing antral ulcers likely source  -Per GI recommended 8 weeks oral PPI 40mg BID followed by taper    Autoimmune hepatitis (HCC)  Assessment & Plan  -Patient previously on immunosuppressants per her account  -Esophageal banding for varices ~9 months ago most recently  -Typically on spironolactone, furosemide, rifaximin, lactulose outpatient; has been nonadherent; holding   -Called patient's prior pharmacy [facility] in [place], per their records patient has not picked up medications since May, was previously on rifaximin 550mg BID, nadolol 20mg daily, citalopram 20mg daily, lasix 40mg daily, and spironolactone 50mg BID.   -Outpatient follows with Dr. Ed Leonardo, based in [place]  -GI recommended obtaining AFP and follow-up with abdominal ultrasound outpatient    Fever  Assessment & Plan  Transient fever to 101.8F morning of 10/16 associated with headache and chills but no neck stiffness, no change in mental status. Did have a cough as well but no hypoxia or dyspnea.  -COVID/Flu/RSV nasal swab  -CXR ordered  -Blood cultures x2 drawn, will hold on empiric antibiotic therapy    Hypotension  Assessment & Plan  Patient with lightheadedness when standing on admission, which resolved after IVF administration  -SBPs 90s to low 100s  -She endorses her blood pressures typically run low  -Outpatient is on Midodrine but has been nonadherent  -Resuming Midodrine 2.5mg TID    Thrombocytopenia (HCC)  Assessment & Plan  Initial platelets 52k  -Transfuse platelets above to goal >50 in active bleeding, otherwise lower transfusion target  -Plt TEG inhibited consider ddavp if concern for rebleed  -Thombocytopenia likely in the context of  autoimmune liver disease         VTE prophylaxis: SCDs/TEDs    I have performed a physical exam and reviewed and updated ROS and Plan today (10/16/2023). In review of yesterday's note (10/15/2023), there are no changes except as documented above.

## 2023-10-16 NOTE — CARE PLAN
The patient is Stable - Low risk of patient condition declining or worsening    Shift Goals  Clinical Goals: Safety, monitor vitals, gastroscopy, monitor Hgb/ Platelets  Patient Goals: Feel better  Family Goals: ANTWAN    Progress made toward(s) clinical / shift goals:    Problem: Pain - Standard  Goal: Alleviation of pain or a reduction in pain to the patient’s comfort goal  Outcome: Progressing  Flowsheets (Taken 10/15/2023 1817)  OB Pain Intervention:   Medication - See MAR   [place]   Rest   Education   Environmental changes  Note: PRN pain medications in use for pain control.  Non pharmacological interventions implemented as listed above     Problem: Provide Safe Environment  Goal: Suicide environmental safety, protocols, policies, and practices will be implemented  Outcome: Progressing  Flowsheets (Taken 10/15/2023 1823)  Safety Interventions:   Patient Wearing Hospital Clothing   Provided Safety Education     Problem: Psychosocial  Goal: Patient's ability to identify and develop effective coping behaviors will improve  Outcome: Progressing  Note: Patient encouraged to voice feelings        Patient is not progressing towards the following goals:

## 2023-10-16 NOTE — NON-PROVIDER
This note is intended for the purposes of medical student education and feedback only.   Please refer to the documentation by this patient's assigned medical practitioner for details of care and plans.    Medical Student Progress Note  Note Author: Amrik Plattbob, Student  Date: 10/16/2023    Date of Admission: 10/14/2023  Primary Team: [facility] Team  Attending: Dr. Hemanth John  Senior Resident: Dr. Oscar Bolden  Medical Student: Amrik Gleason, MS3    ID/CC: Tonie Tineo is a 38 y.o. female with a PMH of autoimmune hepatitis and esophageal varices s/p variceal band ligation 9 months ago who was admitted on 10/14/2023 with a chief complaint of bloody vomit.    Hospital Course:  On the night of 10/14, the patient presented to the ED after two episodes of hematemesis at 4pm and 7pm. She has recently been having recurrent migraines, for which she has been taking ibuprofen about 4 times per day for about the last month. She sees Dr. Ed Leonardo in [place] but she has not had regular follow up and she states she has not been taking her medications (spironolactone, furosemide, rifaximin, lactulose, and midodrine) for the past 3 months.     Initial labs showed Hgb 9.7 and Platelets 57. She was hypotensive at 94/48, and she stated her blood pressure is typically low.EKG showed normal sinus rhythm.   Two large bore IV's were placed and she received IV fluids, protonix, and octreotide. Midodrine was restarted. She was admitted to telemetry.    EGD performed on 10/15/2023 showed a small esophageal varix, hypertensive gastropathy, and GAVE syndrome with antral stomach ulcers. The ulcers are thought to be the source of her bleeding. GI recommended oral PPI's.    Interval Update for 10/16/2023  Patient did not have any events overnight.    This morning she reports waking up with the sweats. She is still having headache and some nausea, but no vomiting. She has not had a bowel movement since being admitted, but does  "not feel constipated or bloated. She is still having epigastric tenderness.    During rounds she spiked a fever (101.8F), was having pain with inspiration, and a cough.    Review of Systems  Constitutional ROS: Positive for chills, fever, sweats   Eye ROS: No recent significant change in vision  Mouth/Throat ROS: No sore throat  Neck ROS: No swollen glands, No significant pain in neck  Pulmonary ROS: Positive for cough, pleuritic pain  Cardiovascular ROS: No chest pain, No edema, No palpitations  Gastrointestinal ROS: No vomiting, diarrhea. Positive for change in bowel habits, nausea  Musculoskeletal/Extremities ROS: No cyanosis, No pain, redness or swelling on the joints  Skin/Integumentary ROS: No evidence of rash, No itching  Neurologic ROS: Positive for headaches      OBJECTIVE   Physical Exam:  /57   Pulse 99   Temp (!) 38.8 °C (101.8 °F) (Axillary)   Resp 20   Ht 1.727 m (5' 7.99\")   Wt 73.9 kg (162 lb 14.7 oz)   SpO2 92%     Intake/Output Summary (Last 24 hours) at 10/16/2023 1458  Last data filed at 10/16/2023 1200  Gross per 24 hour   Intake 420 ml   Output --   Net 420 ml       General: Well developed, well nourished female, appears stated age. Is ill-appearing  HEENT: Normocephalic, atraumatic. EOM grossly intact, PERRLA. Mucous membranes moist. No lymphadenopathy.  Cardio: Normal S1 and S2. Regular rate and rhythm. No murmurs, rubs, or gallops.  Pulmonary: Lungs are clear to auscultation bilaterally. No wheezes, rales, or rhonchi.  Abdomen: Normoactive bowel sounds. Abdomen is soft and nondistended. No masses. No ascites. Tenderness to palpation in the epigastrum.  MSK: Normal ROM. Extremities well-perfused. Capillary refill <2 seconds. No LE edema.  Neuro: A&Ox3. CN II-XII grossly intact. Strength and sensation intact throughout.   Skin: No rash, lesions, or skin ulcers. No jaundice, ecchymoses, or petechiae.   Psych: Appropriate mood and affect.    Lab Results:  Recent Labs     " 10/15/23  1219 10/15/23  1455 10/16/23  0041   WBC 2.4* 2.8* 3.8*   RBC 3.60* 3.69* 3.55*   HEMOGLOBIN 9.4* 9.7* 9.2*   HEMATOCRIT 31.4* 31.7* 30.8*   MCV 87.2 85.9 86.8   MCH 26.1* 26.3* 25.9*   RDW 45.5 44.2 45.6   PLATELETCT 52* 53* 55*   MPV 10.5 10.8 10.6   NEUTSPOLYS 58.10 62.10 68.30   LYMPHOCYTES 19.10* 15.40* 14.40*   MONOCYTES 13.70* 15.40* 11.30   EOSINOPHILS 7.90* 6.40 5.20   BASOPHILS 0.80 0.70 0.50   RBCMORPHOLO Present  --   --      Recent Labs     10/14/23  2116 10/16/23  0041   SODIUM 139 140   POTASSIUM 3.7 4.3   CHLORIDE 109 107   CO2 20 25   BUN 9 9   CREATININE 0.39* 0.68   CALCIUM 9.1 8.4*   ALBUMIN 4.1 3.4     Estimated GFR/CRCL = Estimated Creatinine Clearance: 112 mL/min (by C-G formula based on SCr of 0.68 mg/dL).  Recent Labs     10/14/23  2116 10/16/23  0041   GLUCOSE 122* 128*     Recent Labs     10/14/23  2116 10/16/23  0041   ASTSGOT 29 35   ALTSGPT 33 38   TBILIRUBIN 0.7 1.1   ALKPHOSPHAT 83 66   GLOBULIN 2.5 2.3   INR 1.61*  --              Recent Labs     10/14/23  2116   INR 1.61*       Microbiology Results:  Results       Procedure Component Value Units Date/Time    CoV-2, Flu A/B, And RSV by PCR ([employer]) [[id number]] Collected: 10/16/23 1437    Order Status: Completed Specimen: Respirate from Nasal Updated: 10/16/23 1453     SARS-CoV-2 Source NP Swab    Narrative:      Collected By: [id number] YAHIR LEIVA V  Release to patient->Immediate    BLOOD CULTURE [[id number]]     Order Status: Sent Specimen: Blood from Peripheral     BLOOD CULTURE [[id number]]     Order Status: Sent Specimen: Blood from Peripheral     URINALYSIS [[id number]]  (Abnormal) Collected: 10/14/23 2219    Order Status: Completed Specimen: Urine Updated: 10/14/23 2309     Color Yellow     Character Clear     Specific Gravity 1.017     Ph 6.5     Glucose Negative mg/dL      Ketones Negative mg/dL      Protein Negative mg/dL      Bilirubin Negative     Urobilinogen, Urine 1.0     Nitrite Positive     Leukocyte Esterase  Trace     Occult Blood Negative     Micro Urine Req Microscopic    Narrative:      Release to patient->Immediate            Imaging Results:  DX-CHEST-PORTABLE (1 VIEW)   Final Result         No acute cardiac or pulmonary abnormality is identified.      US-ABDOMEN COMPLETE SURVEY    (Results Pending)   DX-CHEST-2 VIEWS    (Results Pending)       EKG  Results for orders placed or performed during the hospital encounter of 10/14/23   EKG   Result Value Ref Range    Report       [facility] Emergency Dept.    Test Date:  2023-10-14  Pt Name:    RADHA STRINGER            Department: ER  MRN:        [id number]                      Room:       [facility]  Gender:     Male                         Technician: [id number]  :        1985                   Requested By:ER TRIAGE PROTOCOL  Order #:    [id number]                    Reading MD:    Measurements  Intervals                                Axis  Rate:       81                           P:          55  PA:         137                          QRS:        44  QRSD:       88                           T:          56  QT:         372  QTc:        432    Interpretive Statements  Sinus rhythm  No previous ECG available for comparison         Current Medications    Current Facility-Administered Medications:     ondansetron (Zofran) syringe/vial injection 4 mg, 4 mg, Intravenous, Q4HRS PRN, Camilo Pacheco M.D.    midodrine (Proamatine) tablet 2.5 mg, 2.5 mg, Oral, TID WITH MEALS, Camilo Pacheco M.D., 2.5 mg at 10/16/23 1156    HYDROmorphone (Dilaudid) injection 0.5 mg, 0.5 mg, Intravenous, Q4HRS PRN, Camilo Pacheco M.D., 0.5 mg at 10/16/23 1233    omeprazole (PriLOSEC) capsule 40 mg, 40 mg, Oral, BID, Oscar Bolden M.D., 40 mg at 10/16/23 0620    ASSESSMENT/PLAN  Radha Stringer is a 38 y.o. female with a PMH of autoimmune hepatitis noncompliant with treatment and esophageal varices s/p band ligation 9 months ago who was admitted on  10/14/2023 with hematemesis.      Portal hypertensive gastropathy with gastric antral vascular ectasia  EGD showed esophageal varix, hypertensive gastropathy, and GAVE syndrome with ulcers in the stomach antrum representing the likely sources of the patients hematemesis. Her Hgb today is 9.4 and platelets 52 and she reports no more episodes of hematemesis.   - 80mg oral PPI for 8 weeks per GI recommendations   - GI following   - Recommend outpatient follow up with GI specialist  Fever  Patient has new onset fever of 101.8F, chills, and sweats. She is also having a new cough and pleuritic pain, but she is not hypoxic, having dyspnea, and there are no abnormal auscultation findings on exam. No neck pain or altered mental status. These respiratory symptoms along with her severe headaches could indicate a COVID-19 infection. Given her consistent hypotension, it would be reasonable to order blood cultures as well, although she only meets one SIRS criteria so this is less of a concern.  - COVID swab pending  - Chest X-ray. Results pending  - Blood cultures drawn  - Pt. Has been on ppx ceftriaxone recently, so will hold on empiric antibiotics  Hypotension  Patient's blood pressure consistently 90's systolic and 50-60's diastolic which is her reported normal, likely due to autoimmune liver disease and increased circulation of vasodilatory molecules.  - Continue midodrine  - PO hydration as tolerated  Anemia and thrombocytopenia  Decrease of Hgb to 9.4 from 9.7 and platelets to 52 from 57 is likely due to a combination of dilutional effects, splenic sequestration in the setting of autoimmune liver disease, and minor upper GI bleeding. Patient has not noticed any more active bleeding at this time.  - Continue to monitor H&H  - Transfuse if Hgb < 7  Autoimmune hepatitis  Patient has signs of cirrhosis and is s/p esophageal banding for varices. Patient reports she was previously on immunosuppressants but there is no record of  immunosuppressants from her pharmacy. She was taking furosemide, lasix, rifaximin, and lactulose but has not picked them up from her pharmacy since May.  - Follow up with Dr. Leonardo in [place] to resume treatment   - GI has recommended AFP and abdominal ultrasound. Can be done outpatient

## 2023-10-16 NOTE — NON-PROVIDER
This note is intended for the purposes of medical student education and feedback only.   Please refer to the documentation by this patient's assigned medical practitioner for details of care and plans.    Medical Student Progress Note  Note Author: Amrik Plattbob, Student  Date: 10/15/2023    Date of Admission: 10/14/2023  Primary Team: [facility]  Attending: Dr. Hemanth John  Senior Resident: Dr. Oscar Bolden  Medical Student: Amrik Gleason, MS3    ID/CC: Tonie Tineo is a 38 y.o. female with a PMH of autoimmune hepatitis and esophageal varices s/p variceal band ligation 9 months ago who was admitted on 10/14/2023 with a chief complaint of bloody vomit.    HPI/Hospital Course:  On the night of 10/14, the patient presented to the ED after two episodes of hematemesis at 4pm and 7pm. She has recently been having recurrent migraines, for which she has been taking ibuprofen about 4 times per day for about the last month. She sees Dr. Ed Leonardo in [place] but she has not had regular follow up and she states she has not been taking her medications (spironolactone, furosemide, rifaximin, lactulose, and midodrine) for the past 3 months.    Initial labs showed Hgb 9.7 and Platelets 57. She was hypotensive at 94/48, and she stated her blood pressure is typically low.EKG showed normal sinus rhythm.   Two large bore IV's were placed and she received IV fluids, protonix, and octreotide. Midodrine was restarted. She was admitted to telemetry.     Interval Update for 10/15/2023  No overnight events to report.    This morning the patient was still having headache and diffuse abdominal tenderness that is the worst in the epigastric region. She has not had any vomiting since she arrived at the hospital and has not noticed any blood in her urine or stool. She is feeling nauseous as well.     GI performed EGD today and reported a small esophageal varix, hypertensive gastropathy, and GAVE syndrome. They will continue  "to follow the patient.    Review of Systems  Constitutional ROS: No unexplained fevers, sweats, or chills, Positive for fatigue , weakness  Eye ROS: No recent significant change in vision  Mouth/Throat ROS: No sore throat  Neck ROS: No swollen glands  Pulmonary ROS: No wheezing, No shortness of breath, no cough, no hemoptysis  Cardiovascular ROS: No chest pain, No edema, No palpitations  Gastrointestinal ROS: No hematemesis, Positive for nausea and abdominal pain  Genitourinary: No dysuria, urinary frequency or urgency  Musculoskeletal/Extremities ROS: No peripheral edema  Skin/Integumentary ROS: No evidence of rash, No itching  Neurologic ROS: Positive for headaches         OBJECTIVE   Physical Exam:  BP 91/58   Pulse 69   Temp 36.8 °C (98.3 °F) (Temporal)   Resp 18   Ht 1.727 m (5' 8\")   Wt 73.5 kg (162 lb 0.6 oz)   SpO2 100%     Intake/Output Summary (Last 24 hours) at 10/15/2023 2008  Last data filed at 10/15/2023 1530  Gross per 24 hour   Intake 1640 ml   Output 700 ml   Net 940 ml       General: Well developed, well nourished female. She is ill-appearing  HEENT: Normocephalic, atraumatic. EOM grossly intact, PERRLA. Mucous membranes moist. No lymphadenopathy. No scleral icterus  Cardio: Normal S1 and S2. Regular rate and rhythm. No murmurs, rubs, or gallops.  Pulmonary: Lungs are clear to auscultation bilaterally. No wheezes, rales, or rhonchi.  Abdomen: Normoactive bowel sounds. Abdomen is soft and nondistended. No masses. Diffuse tenderness to palpation in all four quadrants.   MSK: Normal ROM. Extremities well-perfused. Capillary refill <2 seconds. No LE edema.  Neuro: A&Ox3   Skin: No rash, lesions, or skin ulcers. No jaundice, ecchymoses, or petechiae.   Psych: Appropriate mood and affect.    Lab Results:  Recent Labs     10/15/23  0143 10/15/23  1219 10/15/23  1455   WBC 2.5* 2.4* 2.8*   RBC 3.51* 3.60* 3.69*   HEMOGLOBIN 9.4* 9.4* 9.7*   HEMATOCRIT 30.1* 31.4* 31.7*   MCV 85.8 87.2 85.9   MCH " 26.8* 26.1* 26.3*   RDW 44.1 45.5 44.2   PLATELETCT 52* 52* 53*   MPV 10.9 10.5 10.8   NEUTSPOLYS 61.90 58.10 62.10   LYMPHOCYTES 18.50* 19.10* 15.40*   MONOCYTES 11.60 13.70* 15.40*   EOSINOPHILS 7.20* 7.90* 6.40   BASOPHILS 0.40 0.80 0.70   RBCMORPHOLO  --  Present  --      Recent Labs     10/14/23  2116   SODIUM 139   POTASSIUM 3.7   CHLORIDE 109   CO2 20   BUN 9   CREATININE 0.39*   CALCIUM 9.1   ALBUMIN 4.1     Estimated GFR/CRCL = Estimated Creatinine Clearance: 195.4 mL/min (A) (by C-G formula based on SCr of 0.39 mg/dL (L)).  Recent Labs     10/14/23  2116   GLUCOSE 122*     Recent Labs     10/14/23  2116   ASTSGOT 29   ALTSGPT 33   TBILIRUBIN 0.7   ALKPHOSPHAT 83   GLOBULIN 2.5   INR 1.61*             Recent Labs     10/14/23  2116   INR 1.61*       Microbiology Results:  Results       Procedure Component Value Units Date/Time    URINALYSIS [[id number]]  (Abnormal) Collected: 10/14/23 2219    Order Status: Completed Specimen: Urine Updated: 10/14/23 2309     Color Yellow     Character Clear     Specific Gravity 1.017     Ph 6.5     Glucose Negative mg/dL      Ketones Negative mg/dL      Protein Negative mg/dL      Bilirubin Negative     Urobilinogen, Urine 1.0     Nitrite Positive     Leukocyte Esterase Trace     Occult Blood Negative     Micro Urine Req Microscopic    Narrative:      Release to patient->Immediate            Imaging Results:  DX-CHEST-PORTABLE (1 VIEW)   Final Result         No acute cardiac or pulmonary abnormality is identified.          EKG  Results for orders placed or performed during the hospital encounter of 10/14/23   EKG   Result Value Ref Range    Report       [facility] Emergency Dept.    Test Date:  2023-10-14  Pt Name:    RADHA STRINGER            Department: ER  MRN:        [id number]                      Room:       RD 01  Gender:     Male                         Technician: [id number]  :        1985                   Requested By:ER TRIAGE  PROTOCOL  Order #:    [id number]                    Reading MD:    Measurements  Intervals                                Axis  Rate:       81                           P:          55  LA:         137                          QRS:        44  QRSD:       88                           T:          56  QT:         372  QTc:        432    Interpretive Statements  Sinus rhythm  No previous ECG available for comparison         Current Medications    Current Facility-Administered Medications:     ondansetron (Zofran) syringe/vial injection 4 mg, 4 mg, Intravenous, Q4HRS PRN, Camilo Pacheco M.D.    midodrine (Proamatine) tablet 2.5 mg, 2.5 mg, Oral, TID WITH MEALS, Camilo Pacheco M.D., 2.5 mg at 10/15/23 1731    HYDROmorphone (Dilaudid) injection 0.5 mg, 0.5 mg, Intravenous, Q4HRS PRN, Camilo Pacheco M.D., 0.5 mg at 10/15/23 1731    omeprazole (PriLOSEC) capsule 40 mg, 40 mg, Oral, BID, Oscar Bolden M.D., 40 mg at 10/15/23 1730    ASSESSMENT/PLAN  Tonie Tineo is a 38 y.o. female with a PMH of autoimmune hepatitis noncompliant with treatment and esophageal varices s/p band ligation 9 months ago who was admitted on 10/14/2023 with hematemesis.    Portal hypertensive gastropathy with gastric antral vascular ectasia  EGD showed esophageal varix, hypertensive gastropathy, and GAVE syndrome with ulcers in the stomach antrum representing the likely sources of the patients hematemesis. Her Hgb today is 9.4 and platelets 52 and she reports no more episodes of hematemesis.   - 40mg oral PPI for 8 weeks per GI recommendations   - GI following   - Recommend outpatient follow up with GI specialist  Hypotension  Patient's blood pressure consistently 90's systolic and 50-60's diastolic which is her reported normal, likely due to autoimmune liver disease and increased circulation of vasodilatory molecules.  - Continue midodrine  - PO hydration as tolerated  Anemia and thrombocytopenia  Decrease of Hgb to 9.4 from 9.7  and platelets to 52 from 57 is likely due to a combination of dilutional effects, splenic sequestration in the setting of autoimmune liver disease, and minor upper GI bleeding. Patient has not noticed any more active bleeding at this time.  - Continue to monitor H&H  - Transfuse if Hgb < 7  Autoimmune hepatitis  Patient has signs of cirrhosis and is s/p esophageal banding for varices. She was previously on immunosuppressants and furosemide, lasix, rifaximin, and lactulose but has not been taking them for at least the last 3 months.  - Follow up with Dr. Leonardo in [place] to resume treatment

## 2023-10-16 NOTE — PROGRESS NOTES
Gastroenterology Progress Note               Author:  LUIS ALBERTO Kyle Date & Time Created: 10/16/2023 3:13 PM       Patient ID:  Name:             Tonei Tineo    YOB: 1985  Age:                 38 y.o.  female  MRN:               [id number]        Medical Decision Making, by Problem:  Active Hospital Problems    Diagnosis     Autoimmune hepatitis (HCC) [K75.4]     Thrombocytopenia (HCC) [D69.6]     Hypotension [I95.9]            Presenting Chief Complaint:  GI bleeding      History of Present Illness:   The patient is a 38 years old female with medical history of autoimmune hepatitis, chronic liver disease, cirrhosis currently on lactulose and spironolactone, previous GI bleeding from esophageal varices status post the last variceal banding about 9 months ago at outside hospital, was in a discussion of transplantation in the last 3 years, however not on the list yet.  GI team is consulted in the early morning for GI bleeding.     Per chart review under the information from the patient, she has been off any liver medication for a while due to access issue and also changing provider situation.     Had 2 hematemesis within 24 hours, seems slowing down.  Some discomfort or tenderness in the epigastric area however not getting worse overnight.  Denies any bowel movement overnight.  Last upper GI scope was 9 months ago last colonoscopy about 2 to 3 years ago which was remarkable for the patient.     We saw the patient in the early morning, she was sleeping in the bed.  As there was no distress of respiratory and then no abdominal pain when she wakes up.  Stable vitals with lower blood pressure  Which has been her baseline.     No overt fluid overload in the lower limbs, mild tenderness from the abdomen but no acute abdomen.         Interval History:  10/16/2023: Patient seen at bedside.  Patient with acute onset of fever, malaise, cough, headache.  She reports generalized abdominal discomfort  "and some nausea.  Leukopenia seen.  Hemoglobin stable.  Thrombocytopenia stable.  LFTs normal.  BUN normal.      Hospital Medications:  Current Facility-Administered Medications   Medication Dose Frequency Provider Last Rate Last Admin    ondansetron (Zofran) syringe/vial injection 4 mg  4 mg Q4HRS PRN Camilo Pacheco M.D.        midodrine (Proamatine) tablet 2.5 mg  2.5 mg TID WITH MEALS Camilo Pacheco M.D.   2.5 mg at 10/16/23 1156    HYDROmorphone (Dilaudid) injection 0.5 mg  0.5 mg Q4HRS PRN Camilo Pacheco M.D.   0.5 mg at 10/16/23 1233    omeprazole (PriLOSEC) capsule 40 mg  40 mg BID Oscar Bolden M.D.   40 mg at 10/16/23 0620   Last reviewed on 10/15/2023 10:03 AM by Zoe Meredith R.N.       Review of Systems:  Review of Systems   Constitutional:  Positive for chills, fever and malaise/fatigue.   HENT:  Negative for congestion and sore throat.    Respiratory:  Positive for cough. Negative for hemoptysis, sputum production and shortness of breath.    Cardiovascular:  Negative for chest pain and leg swelling.   Gastrointestinal:  Positive for abdominal pain and nausea. Negative for blood in stool, constipation, diarrhea, melena and vomiting.   Genitourinary:  Negative for dysuria and flank pain.   Musculoskeletal:  Positive for myalgias. Negative for falls.   Neurological:  Positive for weakness and headaches.   Psychiatric/Behavioral:  The patient is not nervous/anxious and does not have insomnia.    All other systems reviewed and are negative.        Vital signs:  Weight/BMI: Body mass index is 24.78 kg/m².  /57   Pulse 99   Temp (!) 38.8 °C (101.8 °F) (Axillary)   Resp 20   Ht 1.727 m (5' 7.99\")   Wt 73.9 kg (162 lb 14.7 oz)   SpO2 92%   Vitals:    10/16/23 0319 10/16/23 0613 10/16/23 0909 10/16/23 1203   BP: 96/71 92/51 91/57 100/57   Pulse: 76 72 79 99   Resp: 17 18 17 20   Temp: 37.5 °C (99.5 °F)  37.3 °C (99.2 °F) (!) 38.8 °C (101.8 °F)   TempSrc: Temporal  Oral Axillary " "  SpO2: 93%  93% 92%   Weight: 73.9 kg (162 lb 14.7 oz)      Height: 1.727 m (5' 7.99\")        Oxygen Therapy:  Pulse Oximetry: 92 %, O2 (LPM): 0, O2 Delivery Device: None - Room Air    Intake/Output Summary (Last 24 hours) at 10/16/2023 1513  Last data filed at 10/16/2023 1200  Gross per 24 hour   Intake 420 ml   Output --   Net 420 ml         Physical Exam:  Physical Exam  Vitals and nursing note reviewed.   Constitutional:       General: She is awake.      Appearance: She is ill-appearing.   HENT:      Nose: Nose normal. No congestion.      Mouth/Throat:      Mouth: Mucous membranes are moist.      Pharynx: Oropharynx is clear. No oropharyngeal exudate.   Eyes:      General: No scleral icterus.     Extraocular Movements: Extraocular movements intact.      Conjunctiva/sclera: Conjunctivae normal.   Cardiovascular:      Rate and Rhythm: Normal rate and regular rhythm.      Pulses: Normal pulses.      Heart sounds: Normal heart sounds.   Pulmonary:      Effort: Pulmonary effort is normal. No respiratory distress.      Breath sounds: Normal breath sounds. No wheezing.   Abdominal:      General: Abdomen is flat. Bowel sounds are normal. There is no distension.      Palpations: Abdomen is soft.      Tenderness: There is abdominal tenderness (Generalized). There is no guarding.   Musculoskeletal:      Right lower leg: No edema.      Left lower leg: No edema.   Skin:     General: Skin is warm and dry.      Capillary Refill: Capillary refill takes less than 2 seconds.      Coloration: Skin is pale. Skin is not jaundiced.   Neurological:      General: No focal deficit present.      Mental Status: She is alert and oriented to person, place, and time. Mental status is at baseline.   Psychiatric:         Mood and Affect: Mood normal.         Behavior: Behavior normal. Behavior is cooperative.         Thought Content: Thought content normal.         Judgment: Judgment normal.             Labs:  Recent Labs     10/14/23  2116 " 10/16/23  0041   SODIUM 139 140   POTASSIUM 3.7 4.3   CHLORIDE 109 107   CO2 20 25   BUN 9 9   CREATININE 0.39* 0.68   CALCIUM 9.1 8.4*     Recent Labs     10/14/23  2116 10/16/23  0041   ALTSGPT 33 38   ASTSGOT 29 35   ALKPHOSPHAT 83 66   TBILIRUBIN 0.7 1.1   LIPASE 30  --    GLUCOSE 122* 128*     Recent Labs     10/14/23  2116 10/15/23  0143 10/15/23  1219 10/15/23  1455 10/16/23  0041   WBC 3.7*   < > 2.4* 2.8* 3.8*   NEUTSPOLYS 70.50   < > 58.10 62.10 68.30   LYMPHOCYTES 13.60*   < > 19.10* 15.40* 14.40*   MONOCYTES 10.00   < > 13.70* 15.40* 11.30   EOSINOPHILS 5.10   < > 7.90* 6.40 5.20   BASOPHILS 0.50   < > 0.80 0.70 0.50   ASTSGOT 29  --   --   --  35   ALTSGPT 33  --   --   --  38   ALKPHOSPHAT 83  --   --   --  66   TBILIRUBIN 0.7  --   --   --  1.1    < > = values in this interval not displayed.     Recent Labs     10/14/23  2116 10/15/23  0143 10/15/23  1219 10/15/23  1455 10/16/23  0041   RBC 3.64*   < > 3.60* 3.69* 3.55*   HEMOGLOBIN 9.7*   < > 9.4* 9.7* 9.2*   HEMATOCRIT 30.8*   < > 31.4* 31.7* 30.8*   PLATELETCT 57*   < > 52* 53* 55*   PROTHROMBTM 19.4*  --   --   --   --    INR 1.61*  --   --   --   --     < > = values in this interval not displayed.     Recent Results (from the past 24 hour(s))   CBC WITH DIFFERENTIAL    Collection Time: 10/16/23 12:41 AM   Result Value Ref Range    WBC 3.8 (L) 4.8 - 10.8 K/uL    RBC 3.55 (L) 4.20 - 5.40 M/uL    Hemoglobin 9.2 (L) 12.0 - 16.0 g/dL    Hematocrit 30.8 (L) 37.0 - 47.0 %    MCV 86.8 81.4 - 97.8 fL    MCH 25.9 (L) 27.0 - 33.0 pg    MCHC 29.9 (L) 32.2 - 35.5 g/dL    RDW 45.6 35.9 - 50.0 fL    Platelet Count 55 (L) 164 - 446 K/uL    MPV 10.6 9.0 - 12.9 fL    Neutrophils-Polys 68.30 44.00 - 72.00 %    Lymphocytes 14.40 (L) 22.00 - 41.00 %    Monocytes 11.30 0.00 - 13.40 %    Eosinophils 5.20 0.00 - 6.90 %    Basophils 0.50 0.00 - 1.80 %    Immature Granulocytes 0.30 0.00 - 0.90 %    Nucleated RBC 0.00 0.00 - 0.20 /100 WBC    Neutrophils (Absolute) 2.60  1.82 - 7.42 K/uL    Lymphs (Absolute) 0.55 (L) 1.00 - 4.80 K/uL    Monos (Absolute) 0.43 0.00 - 0.85 K/uL    Eos (Absolute) 0.20 0.00 - 0.51 K/uL    Baso (Absolute) 0.02 0.00 - 0.12 K/uL    Immature Granulocytes (abs) 0.01 0.00 - 0.11 K/uL    NRBC (Absolute) 0.00 K/uL   Comp Metabolic Panel    Collection Time: 10/16/23 12:41 AM   Result Value Ref Range    Sodium 140 135 - 145 mmol/L    Potassium 4.3 3.6 - 5.5 mmol/L    Chloride 107 96 - 112 mmol/L    Co2 25 20 - 33 mmol/L    Anion Gap 8.0 7.0 - 16.0    Glucose 128 (H) 65 - 99 mg/dL    Bun 9 8 - 22 mg/dL    Creatinine 0.68 0.50 - 1.40 mg/dL    Calcium 8.4 (L) 8.5 - 10.5 mg/dL    Correct Calcium 8.9 8.5 - 10.5 mg/dL    AST(SGOT) 35 12 - 45 U/L    ALT(SGPT) 38 2 - 50 U/L    Alkaline Phosphatase 66 30 - 99 U/L    Total Bilirubin 1.1 0.1 - 1.5 mg/dL    Albumin 3.4 3.2 - 4.9 g/dL    Total Protein 5.7 (L) 6.0 - 8.2 g/dL    Globulin 2.3 1.9 - 3.5 g/dL    A-G Ratio 1.5 g/dL   IMMATURE PLT FRACTION    Collection Time: 10/16/23 12:41 AM   Result Value Ref Range    Imm. Plt Fraction 3.9 0.6 - 13.1 %   ESTIMATED GFR    Collection Time: 10/16/23 12:41 AM   Result Value Ref Range    GFR (CKD-EPI) 113 >60 mL/min/1.73 m 2   CoV-2, Flu A/B, And RSV by PCR ([facility])    Collection Time: 10/16/23  2:37 PM    Specimen: Nasal; Respirate   Result Value Ref Range    SARS-CoV-2 Source NP Swab        Radiology Review:  DX-CHEST-PORTABLE (1 VIEW)   Final Result         No acute cardiac or pulmonary abnormality is identified.      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  She underwent EGD 10/15/2023 during which time grade 1, small esophageal varix at 3:00 was seen.  No higher stigmata.  Portal hypertensive gastropathy as well as retained food was seen in stomach.  No gastric varix.  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  Ulcers are small and no high risk lesion.  Biopsies were obtained.  Post procedurally, hemoglobin stable.  BUN normal.  Patient with ongoing leukopenia and thrombocytopenia.  She reports acute onset of fever, chills, malaise, cough, general unwellness.  She states she is concerned she may have COVID.  Primary team aware and evaluating.  Given patient's history, recommended complete abdominal ultrasound-this may be done while patient remains inpatient or can be done on outpatient basis if needed.  Additionally alpha-fetoprotein was also ordered to complete HCC screening.    Plan:  Monitor H/H and for signs of rebleeding  PPI 40 mg p.o. twice daily x8 weeks then taper.  Repeat EGD with outpatient GI.  Referral in place.  Patient tolerating diet may advance as tolerated.  GI to follow biopsies  AFP ordered  Recommend complete abdominal ultrasound-may be done inpatient versus outpatient.    Discussed with patient, nursing, primary team, Dr. Jade.      Core Quality Measures   Reviewed items::  Labs, Medications and Radiology reports reviewed

## 2023-10-16 NOTE — CARE PLAN
The patient is Stable - Low risk of patient condition declining or worsening    Shift Goals  Clinical Goals: pain management  Patient Goals: pain  Family Goals: ANTWAN    Progress made toward(s) clinical / shift goals:  progressing     Problem: Pain - Standard  Goal: Alleviation of pain or a reduction in pain to the patient’s comfort goal  Outcome: Progressing  Note: Pt pain assessed as pt needs. Pain management provided per MAR. Pt unable to report relief from pain 6 hour janina .

## 2023-10-16 NOTE — CARE PLAN
"The patient is Stable - Low risk of patient condition declining or worsening    Shift Goals  Clinical Goals: Pain management, monitor vitals  Patient Goals: Pain management  Family Goals: ANTWAN    Progress made toward(s) clinical / shift goals:    Problem: Pain - Standard  Goal: Alleviation of pain or a reduction in pain to the patient’s comfort goal  Outcome: Progressing  Flowsheets (Taken 10/16/2023 1318)  OB Pain Intervention:   Medication - See MAR   [place]   Food   Education   Rest  Note: Patient continuing to report a headache she describes as \"constant and sharp\". Patient encouraged to use non pharmacological interventions prior to medical ones including the ones listed above. In addition, patient encouraged to increase fluid intake in case of dehydration.      Problem: Provide Safe Environment  Goal: Suicide environmental safety, protocols, policies, and practices will be implemented  Outcome: Progressing  Flowsheets (Taken 10/16/2023 2600)  Safety Interventions:   Provided Safety Education   Patient Wearing Hospital Clothing     Problem: Psychosocial  Goal: Patient's ability to identify and develop effective coping behaviors will improve  Outcome: Progressing  Note: Patient encouraged to voice feelings, provided safe and non judgmental environment to do so        Patient is not progressing towards the following goals:      "

## 2023-10-16 NOTE — ASSESSMENT & PLAN NOTE
Transient fever to 101.8F morning of 10/16 associated with headache and chills but no neck stiffness, no change in mental status. Did have a cough as well but no hypoxia or dyspnea.  -COVID/Flu/RSV nasal swab  -CXR ordered  -Blood cultures x2 drawn, will hold on empiric antibiotic therapy

## 2023-10-17 ENCOUNTER — APPOINTMENT (OUTPATIENT)
Dept: RADIOLOGY | Facility: MEDICAL CENTER | Age: 38
End: 2023-10-17
Payer: MEDICARE

## 2023-10-17 VITALS
HEIGHT: 68 IN | HEART RATE: 68 BPM | RESPIRATION RATE: 16 BRPM | WEIGHT: 165.57 LBS | DIASTOLIC BLOOD PRESSURE: 69 MMHG | BODY MASS INDEX: 25.09 KG/M2 | OXYGEN SATURATION: 98 % | TEMPERATURE: 98.6 F | SYSTOLIC BLOOD PRESSURE: 119 MMHG

## 2023-10-17 PROBLEM — R50.9 FEVER: Status: RESOLVED | Noted: 2023-10-16 | Resolved: 2023-10-17

## 2023-10-17 LAB
AFP-TM SERPL-MCNC: 3 NG/ML (ref 0–9)
ALBUMIN SERPL BCP-MCNC: 3.4 G/DL (ref 3.2–4.9)
ALBUMIN/GLOB SERPL: 1.5 G/DL
ALP SERPL-CCNC: 66 U/L (ref 30–99)
ALT SERPL-CCNC: 25 U/L (ref 2–50)
ANION GAP SERPL CALC-SCNC: 7 MMOL/L (ref 7–16)
AST SERPL-CCNC: 23 U/L (ref 12–45)
BILIRUB SERPL-MCNC: 0.8 MG/DL (ref 0.1–1.5)
BUN SERPL-MCNC: 7 MG/DL (ref 8–22)
CALCIUM ALBUM COR SERPL-MCNC: 8.8 MG/DL (ref 8.5–10.5)
CALCIUM SERPL-MCNC: 8.3 MG/DL (ref 8.5–10.5)
CHLORIDE SERPL-SCNC: 106 MMOL/L (ref 96–112)
CO2 SERPL-SCNC: 25 MMOL/L (ref 20–33)
CREAT SERPL-MCNC: 0.55 MG/DL (ref 0.5–1.4)
ERYTHROCYTE [DISTWIDTH] IN BLOOD BY AUTOMATED COUNT: 43.6 FL (ref 35.9–50)
GFR SERPLBLD CREATININE-BSD FMLA CKD-EPI: 119 ML/MIN/1.73 M 2
GLOBULIN SER CALC-MCNC: 2.3 G/DL (ref 1.9–3.5)
GLUCOSE SERPL-MCNC: 108 MG/DL (ref 65–99)
HCT VFR BLD AUTO: 28 % (ref 37–47)
HGB BLD-MCNC: 8.8 G/DL (ref 12–16)
MCH RBC QN AUTO: 26.7 PG (ref 27–33)
MCHC RBC AUTO-ENTMCNC: 31.4 G/DL (ref 32.2–35.5)
MCV RBC AUTO: 85.1 FL (ref 81.4–97.8)
PLATELET # BLD AUTO: 52 K/UL (ref 164–446)
PLATELETS.RETICULATED NFR BLD AUTO: 4.3 % (ref 0.6–13.1)
PMV BLD AUTO: 10.3 FL (ref 9–12.9)
POTASSIUM SERPL-SCNC: 3.7 MMOL/L (ref 3.6–5.5)
PROCALCITONIN SERPL-MCNC: 0.11 NG/ML
PROT SERPL-MCNC: 5.7 G/DL (ref 6–8.2)
RBC # BLD AUTO: 3.29 M/UL (ref 4.2–5.4)
SODIUM SERPL-SCNC: 138 MMOL/L (ref 135–145)
WBC # BLD AUTO: 4 K/UL (ref 4.8–10.8)

## 2023-10-17 PROCEDURE — 85055 RETICULATED PLATELET ASSAY: CPT

## 2023-10-17 PROCEDURE — G0378 HOSPITAL OBSERVATION PER HR: HCPCS

## 2023-10-17 PROCEDURE — 84145 PROCALCITONIN (PCT): CPT

## 2023-10-17 PROCEDURE — 700111 HCHG RX REV CODE 636 W/ 250 OVERRIDE (IP): Mod: UD | Performed by: STUDENT IN AN ORGANIZED HEALTH CARE EDUCATION/TRAINING PROGRAM

## 2023-10-17 PROCEDURE — 85027 COMPLETE CBC AUTOMATED: CPT

## 2023-10-17 PROCEDURE — 99239 HOSP IP/OBS DSCHRG MGMT >30: CPT | Mod: GC | Performed by: STUDENT IN AN ORGANIZED HEALTH CARE EDUCATION/TRAINING PROGRAM

## 2023-10-17 PROCEDURE — A9270 NON-COVERED ITEM OR SERVICE: HCPCS | Mod: UD | Performed by: STUDENT IN AN ORGANIZED HEALTH CARE EDUCATION/TRAINING PROGRAM

## 2023-10-17 PROCEDURE — A9270 NON-COVERED ITEM OR SERVICE: HCPCS | Mod: UD

## 2023-10-17 PROCEDURE — 76705 ECHO EXAM OF ABDOMEN: CPT

## 2023-10-17 PROCEDURE — 700102 HCHG RX REV CODE 250 W/ 637 OVERRIDE(OP): Mod: UD

## 2023-10-17 PROCEDURE — 80053 COMPREHEN METABOLIC PANEL: CPT

## 2023-10-17 PROCEDURE — 96376 TX/PRO/DX INJ SAME DRUG ADON: CPT

## 2023-10-17 PROCEDURE — 700102 HCHG RX REV CODE 250 W/ 637 OVERRIDE(OP): Mod: UD | Performed by: STUDENT IN AN ORGANIZED HEALTH CARE EDUCATION/TRAINING PROGRAM

## 2023-10-17 RX ORDER — MIDODRINE HYDROCHLORIDE 2.5 MG/1
2.5 TABLET ORAL
Qty: 60 TABLET | Refills: 0 | Status: SHIPPED | OUTPATIENT
Start: 2023-10-17

## 2023-10-17 RX ORDER — SPIRONOLACTONE 50 MG/1
50 TABLET, FILM COATED ORAL 2 TIMES DAILY
Qty: 60 TABLET | Refills: 0 | Status: SHIPPED | OUTPATIENT
Start: 2023-10-17 | End: 2023-11-16

## 2023-10-17 RX ORDER — SPIRONOLACTONE 50 MG/1
50 TABLET, FILM COATED ORAL 2 TIMES DAILY
Qty: 60 TABLET | Refills: 0 | Status: SHIPPED | OUTPATIENT
Start: 2023-10-17 | End: 2023-10-17 | Stop reason: SDUPTHER

## 2023-10-17 RX ORDER — OMEPRAZOLE 40 MG/1
40 CAPSULE, DELAYED RELEASE ORAL 2 TIMES DAILY
Qty: 108 CAPSULE | Refills: 0 | Status: SHIPPED | OUTPATIENT
Start: 2023-10-17 | End: 2023-10-17 | Stop reason: SDUPTHER

## 2023-10-17 RX ORDER — OMEPRAZOLE 40 MG/1
40 CAPSULE, DELAYED RELEASE ORAL 2 TIMES DAILY
Qty: 108 CAPSULE | Refills: 0 | Status: SHIPPED | OUTPATIENT
Start: 2023-10-17 | End: 2023-10-19

## 2023-10-17 RX ORDER — ACETAMINOPHEN 325 MG/1
650 TABLET ORAL EVERY 6 HOURS PRN
Status: DISCONTINUED | OUTPATIENT
Start: 2023-10-17 | End: 2023-10-17 | Stop reason: HOSPADM

## 2023-10-17 RX ORDER — HYDROMORPHONE HYDROCHLORIDE 1 MG/ML
0.5 INJECTION, SOLUTION INTRAMUSCULAR; INTRAVENOUS; SUBCUTANEOUS
Status: DISCONTINUED | OUTPATIENT
Start: 2023-10-17 | End: 2023-10-17 | Stop reason: HOSPADM

## 2023-10-17 RX ORDER — ACETAMINOPHEN 325 MG/1
325 TABLET ORAL EVERY 4 HOURS PRN
Qty: 30 TABLET | Refills: 0 | Status: SHIPPED | OUTPATIENT
Start: 2023-10-17 | End: 2023-10-19

## 2023-10-17 RX ORDER — FUROSEMIDE 40 MG/1
40 TABLET ORAL DAILY
Qty: 30 TABLET | Refills: 0 | Status: SHIPPED | OUTPATIENT
Start: 2023-10-17 | End: 2023-11-16

## 2023-10-17 RX ORDER — ACETAMINOPHEN 325 MG/1
325 TABLET ORAL EVERY 4 HOURS PRN
Qty: 30 TABLET | Refills: 0 | Status: SHIPPED | OUTPATIENT
Start: 2023-10-17 | End: 2023-10-17 | Stop reason: SDUPTHER

## 2023-10-17 RX ORDER — OXYCODONE HYDROCHLORIDE 10 MG/1
10 TABLET ORAL
Status: DISCONTINUED | OUTPATIENT
Start: 2023-10-17 | End: 2023-10-17 | Stop reason: HOSPADM

## 2023-10-17 RX ORDER — FUROSEMIDE 40 MG/1
40 TABLET ORAL DAILY
Qty: 30 TABLET | Refills: 0 | Status: SHIPPED | OUTPATIENT
Start: 2023-10-17 | End: 2023-10-17 | Stop reason: SDUPTHER

## 2023-10-17 RX ORDER — OXYCODONE HYDROCHLORIDE 5 MG/1
5 TABLET ORAL
Status: DISCONTINUED | OUTPATIENT
Start: 2023-10-17 | End: 2023-10-17 | Stop reason: HOSPADM

## 2023-10-17 RX ADMIN — OXYCODONE HYDROCHLORIDE 10 MG: 10 TABLET ORAL at 13:15

## 2023-10-17 RX ADMIN — OMEPRAZOLE 40 MG: 20 CAPSULE, DELAYED RELEASE ORAL at 05:39

## 2023-10-17 RX ADMIN — HYDROMORPHONE HYDROCHLORIDE 0.5 MG: 1 INJECTION, SOLUTION INTRAMUSCULAR; INTRAVENOUS; SUBCUTANEOUS at 03:39

## 2023-10-17 RX ADMIN — MIDODRINE HYDROCHLORIDE 2.5 MG: 5 TABLET ORAL at 07:58

## 2023-10-17 RX ADMIN — MIDODRINE HYDROCHLORIDE 2.5 MG: 5 TABLET ORAL at 12:25

## 2023-10-17 RX ADMIN — HYDROMORPHONE HYDROCHLORIDE 0.5 MG: 1 INJECTION, SOLUTION INTRAMUSCULAR; INTRAVENOUS; SUBCUTANEOUS at 09:17

## 2023-10-17 ASSESSMENT — PATIENT HEALTH QUESTIONNAIRE - PHQ9
5. POOR APPETITE OR OVEREATING: NOT AT ALL
8. MOVING OR SPEAKING SO SLOWLY THAT OTHER PEOPLE COULD HAVE NOTICED. OR THE OPPOSITE, BEING SO FIGETY OR RESTLESS THAT YOU HAVE BEEN MOVING AROUND A LOT MORE THAN USUAL: NOT AT ALL
SUM OF ALL RESPONSES TO PHQ QUESTIONS 1-9: 0
7. TROUBLE CONCENTRATING ON THINGS, SUCH AS READING THE NEWSPAPER OR WATCHING TELEVISION: NOT AT ALL
1. LITTLE INTEREST OR PLEASURE IN DOING THINGS: NOT AT ALL
9. THOUGHTS THAT YOU WOULD BE BETTER OFF DEAD, OR OF HURTING YOURSELF: NOT AT ALL
6. FEELING BAD ABOUT YOURSELF - OR THAT YOU ARE A FAILURE OR HAVE LET YOURSELF OR YOUR FAMILY DOWN: NOT AL ALL
2. FEELING DOWN, DEPRESSED, IRRITABLE, OR HOPELESS: NOT AT ALL
4. FEELING TIRED OR HAVING LITTLE ENERGY: NOT AT ALL
3. TROUBLE FALLING OR STAYING ASLEEP OR SLEEPING TOO MUCH: NOT AT ALL
SUM OF ALL RESPONSES TO PHQ9 QUESTIONS 1 AND 2: 0

## 2023-10-17 ASSESSMENT — PAIN DESCRIPTION - PAIN TYPE
TYPE: ACUTE PAIN

## 2023-10-17 ASSESSMENT — FIBROSIS 4 INDEX: FIB4 SCORE: 3.45

## 2023-10-17 NOTE — PROGRESS NOTES
Patient was explained discharge lounge procedure, patient verbalized understanding. Patient was given paperwork at bedside that was from the . Patient was dressed and toileted prior to transport arriving. Patient escorted down to the discharge lounge by transport. Case management set up transportation to her group home.

## 2023-10-17 NOTE — DISCHARGE SUMMARY
[facility] Internal Medicine Discharge Summary    Attending: Srinivas Woodson M.d.  Senior Resident: Dr. Bolden  Contact Number: [phone]    CHIEF COMPLAINT ON ADMISSION  Chief Complaint   Patient presents with    Blood in Vomit     Nauseas since 1600, pt vomited bright red blood at 1900 x1 episode. H/o varicies. Epigastric pain 7/10, burning per pt. -thinners   Denies SOB. Denies cardiac h/o.        Reason for Admission  GI bleed     Admission Date  10/14/2023    CODE STATUS  Full Code    HPI & HOSPITAL COURSE  This is a 38 y.o. female here with hematemesis   38 year old female patient with a past medical history significant for autoimmune hepatitis, esophageal banding for varices ~9 months ago, intermittent drug-induced lupus, who presents to the hospital 10/14 with chief complaint of hematemesis at 4pm and 7pm on day of presentation. Patient endorses she does not have regular follow up with her PCP, and has not taken any of her outpatient medications for at least 3 months. She is typically on spironolactone, furosemide, midodrine, rifaximin. She follows with Dr. Ed Leonardo in [place]. She developed recurrent headaches, for which she has been taking about 4 Ibuprofens per day for the past several weeks. Noted to be anemic to 9.7 and thrombocytopenic to 57. 2 large bore IV placed, started on IV protonix, octreotide, as well as ceftriaxone initially. Blood pressures persistently low which per her account was her baseline, restarted on midodrine. GI consulted following morning for endoscopy which showed gastric ulcers at the antrum representing likely bleeding source. Recommended oral PPI BID for 8 weeks and outpatient follow-up with GI. Also recommended obtaining AFP and abdominal ultrasound as well. Restarted on her diuretics at discharge after reconciling with pharmacy.    She notably did run a fever on 10/16, we obtained blood cultures which were negative for 24h at the time of discharge. She was not having  respiratory or urinary symptoms and had a normal CXR that day. We obtained a procalcitonin which was low. Abdominal ultrasound obtained, there was not significant ascites so no sample was drawn to assess for SBP. Suspect fever and malaise likely attributable to some sort of viral illness, though COVID/flu/RSV nasal swab was negative. Do not suspect acute bacterial infection and patient was not septic, though her blood pressures had remained persistently borderline-low during this admission. She was advised on return precautions should any of her symptoms worsen.     Therefore, she is discharged in good and stable condition to home with close outpatient follow-up.    The patient met 2-midnight criteria for an inpatient stay at the time of discharge.    Discharge Date  10/17/2023    Physical Exam on Day of Discharge  Physical Exam  Constitutional:       General: She is not in acute distress.     Appearance: Normal appearance. She is not ill-appearing, toxic-appearing or diaphoretic.   HENT:      Head: Normocephalic and atraumatic.      Mouth/Throat:      Mouth: Mucous membranes are moist.      Pharynx: Oropharynx is clear. No oropharyngeal exudate or posterior oropharyngeal erythema.   Eyes:      General: No scleral icterus.        Right eye: No discharge.         Left eye: No discharge.      Extraocular Movements: Extraocular movements intact.      Conjunctiva/sclera: Conjunctivae normal.   Cardiovascular:      Rate and Rhythm: Normal rate and regular rhythm.      Pulses: Normal pulses.      Heart sounds: No murmur heard.  Pulmonary:      Effort: Pulmonary effort is normal. No respiratory distress.      Breath sounds: Normal breath sounds. No wheezing.   Abdominal:      General: Abdomen is flat. Bowel sounds are normal.      Palpations: Abdomen is soft.      Tenderness: There is abdominal tenderness. There is no right CVA tenderness, left CVA tenderness or guarding.      Comments: Diffuse abdominal tenderness, mildly  distended   Musculoskeletal:         General: Normal range of motion.      Cervical back: Neck supple. No tenderness.      Right lower leg: No edema.      Left lower leg: No edema.   Skin:     General: Skin is warm and dry.      Capillary Refill: Capillary refill takes less than 2 seconds.      Coloration: Skin is not jaundiced.   Neurological:      General: No focal deficit present.      Mental Status: She is alert and oriented to person, place, and time.   Psychiatric:         Mood and Affect: Mood normal.         Behavior: Behavior normal.         Thought Content: Thought content normal.         Judgment: Judgment normal.         FOLLOW UP ITEMS POST DISCHARGE  Follow up with GI outpatient    DISCHARGE DIAGNOSES  Principal Problem:    Multiple gastric ulcers (POA: Unknown)  Active Problems:    Autoimmune hepatitis (HCC) (POA: Unknown)    Thrombocytopenia (HCC) (POA: Unknown)    Hypotension (POA: Unknown)    Lymphopenia (POA: Yes)  Resolved Problems:    GI bleed (POA: Yes)    Asymptomatic bacteriuria (POA: Unknown)    Fever (POA: Unknown)      FOLLOW UP  No future appointments.  No follow-up provider specified.    MEDICATIONS ON DISCHARGE     Medication List        START taking these medications        Instructions   acetaminophen 325 MG Tabs  Commonly known as: Tylenol   Doctor's comments: Not to exceed 2g/day given cirrhosis  Take 1 Tablet by mouth every four hours as needed (headache) for up to 14 days.  Dose: 325 mg     furosemide 40 MG Tabs  Commonly known as: Lasix   Take 1 Tablet by mouth every day for 30 days.  Dose: 40 mg     midodrine 2.5 MG Tabs  Commonly known as: Proamatine   Take 1 Tablet by mouth 3 times a day with meals.  Dose: 2.5 mg     omeprazole 40 MG delayed-release capsule  Commonly known as: PriLOSEC   Take 1 Capsule by mouth 2 times a day for 54 days.  Dose: 40 mg     spironolactone 50 MG Tabs  Commonly known as: Aldactone   Take 1 Tablet by mouth 2 times a day for 30 days.  Dose: 50 mg               Allergies  No Known Allergies    DIET  Orders Placed This Encounter   Procedures    Diet Order Diet: Regular     Standing Status:   Standing     Number of Occurrences:   1     Order Specific Question:   Diet:     Answer:   Regular [1]       ACTIVITY  As tolerated.  Weight bearing as tolerated    CONSULTATIONS  Gastroenterology    PROCEDURES  EGD 10/15/2023    LABORATORY  Lab Results   Component Value Date    SODIUM 138 10/17/2023    POTASSIUM 3.7 10/17/2023    CHLORIDE 106 10/17/2023    CO2 25 10/17/2023    GLUCOSE 108 (H) 10/17/2023    BUN 7 (L) 10/17/2023    CREATININE 0.55 10/17/2023        Lab Results   Component Value Date    WBC 4.0 (L) 10/17/2023    HEMOGLOBIN 8.8 (L) 10/17/2023    HEMATOCRIT 28.0 (L) 10/17/2023    PLATELETCT 52 (L) 10/17/2023        Total time of the discharge process exceeds 35 minutes.

## 2023-10-17 NOTE — CARE PLAN
The patient is Stable - Low risk of patient condition declining or worsening    Shift Goals  Clinical Goals: pain management, monitor vitals  Patient Goals: pain control, rest  Family Goals: ANTWAN    Progress made toward(s) clinical / shift goals:    Problem: Pain - Standard  Goal: Alleviation of pain or a reduction in pain to the patient’s comfort goal  Outcome: Progressing   Pt is able to rate pain on a scale of 1-10, pt states pain is at or below pain goal, pt is offered alternative methods of pain control  Problem: Knowledge Deficit - Standard  Goal: Patient and family/care givers will demonstrate understanding of plan of care, disease process/condition, diagnostic tests and medications  Outcome: Progressing  Pt is able to repeat plan of care, pt is able to repeat back procedure outcomes

## 2023-10-17 NOTE — DISCHARGE PLANNING
Case Management Discharge Planning    Admission Date: 10/14/2023  GMLOS:    ALOS: 0    6-Clicks ADL Score: 24  6-Clicks Mobility Score: 24      Anticipated Discharge Dispo: Discharge Disposition: Discharged to home/self care (01)    DME Needed: No    Action(s) Taken: Updated Provider/Nurse on Discharge Plan, Called Step 2, they will  patient at 10:30 from the discharge lounge, medical team notified. Patient discussed during IDT rounds with medical team, no further needs at this time.     Escalations Completed: None    Medically Clear: Yes    Next Steps: CM will continue to follow for discharge planning needs.     Barriers to Discharge: None      1005     from Step 2 placed on hold at request of medical team.    1530    Cleared by medical team for discharge, Step 2 notified and will  in the DC lounge at 1600.

## 2023-10-17 NOTE — PROGRESS NOTES
Discharge instructions reviewed and signed, all questions answered, PIVs removed, medications sent to outside pharmacy.

## 2023-10-19 ENCOUNTER — APPOINTMENT (OUTPATIENT)
Dept: RADIOLOGY | Facility: MEDICAL CENTER | Age: 38
End: 2023-10-19
Payer: MEDICARE

## 2023-10-19 ENCOUNTER — HOSPITAL ENCOUNTER (EMERGENCY)
Facility: MEDICAL CENTER | Age: 38
End: 2023-10-19
Attending: STUDENT IN AN ORGANIZED HEALTH CARE EDUCATION/TRAINING PROGRAM | Admitting: INTERNAL MEDICINE
Payer: MEDICARE

## 2023-10-19 ENCOUNTER — APPOINTMENT (OUTPATIENT)
Dept: RADIOLOGY | Facility: MEDICAL CENTER | Age: 38
End: 2023-10-19
Attending: STUDENT IN AN ORGANIZED HEALTH CARE EDUCATION/TRAINING PROGRAM
Payer: MEDICARE

## 2023-10-19 VITALS
HEART RATE: 72 BPM | WEIGHT: 161.16 LBS | OXYGEN SATURATION: 100 % | BODY MASS INDEX: 24.42 KG/M2 | HEIGHT: 68 IN | DIASTOLIC BLOOD PRESSURE: 60 MMHG | SYSTOLIC BLOOD PRESSURE: 104 MMHG | RESPIRATION RATE: 14 BRPM | TEMPERATURE: 97.8 F

## 2023-10-19 DIAGNOSIS — D69.6 THROMBOCYTOPENIA (HCC): ICD-10-CM

## 2023-10-19 DIAGNOSIS — K62.5 RECTAL BLEEDING: ICD-10-CM

## 2023-10-19 DIAGNOSIS — D64.9 CHRONIC ANEMIA: ICD-10-CM

## 2023-10-19 LAB
ABO GROUP BLD: NORMAL
ALBUMIN SERPL BCP-MCNC: 4.2 G/DL (ref 3.2–4.9)
ALBUMIN/GLOB SERPL: 1.4 G/DL
ALP SERPL-CCNC: 77 U/L (ref 30–99)
ALT SERPL-CCNC: 28 U/L (ref 2–50)
ANION GAP SERPL CALC-SCNC: 10 MMOL/L (ref 7–16)
APTT PPP: 37.7 SEC (ref 24.7–36)
AST SERPL-CCNC: 25 U/L (ref 12–45)
BASOPHILS # BLD AUTO: 0.4 % (ref 0–1.8)
BASOPHILS # BLD: 0.01 K/UL (ref 0–0.12)
BILIRUB SERPL-MCNC: 1.2 MG/DL (ref 0.1–1.5)
BLD GP AB SCN SERPL QL: NORMAL
BUN SERPL-MCNC: 7 MG/DL (ref 8–22)
CALCIUM ALBUM COR SERPL-MCNC: 8.8 MG/DL (ref 8.5–10.5)
CALCIUM SERPL-MCNC: 9 MG/DL (ref 8.5–10.5)
CHLORIDE SERPL-SCNC: 108 MMOL/L (ref 96–112)
CO2 SERPL-SCNC: 22 MMOL/L (ref 20–33)
CREAT SERPL-MCNC: 0.52 MG/DL (ref 0.5–1.4)
EOSINOPHIL # BLD AUTO: 0.1 K/UL (ref 0–0.51)
EOSINOPHIL NFR BLD: 3.8 % (ref 0–6.9)
ERYTHROCYTE [DISTWIDTH] IN BLOOD BY AUTOMATED COUNT: 43.8 FL (ref 35.9–50)
GFR SERPLBLD CREATININE-BSD FMLA CKD-EPI: 121 ML/MIN/1.73 M 2
GLOBULIN SER CALC-MCNC: 2.9 G/DL (ref 1.9–3.5)
GLUCOSE SERPL-MCNC: 147 MG/DL (ref 65–99)
HCT VFR BLD AUTO: 34.7 % (ref 37–47)
HGB BLD-MCNC: 10.7 G/DL (ref 12–16)
HGB BLD-MCNC: 9.8 G/DL (ref 12–16)
IMM GRANULOCYTES # BLD AUTO: 0 K/UL (ref 0–0.11)
IMM GRANULOCYTES NFR BLD AUTO: 0 % (ref 0–0.9)
INR PPP: 1.44 (ref 0.87–1.13)
LIPASE SERPL-CCNC: 25 U/L (ref 11–82)
LYMPHOCYTES # BLD AUTO: 0.38 K/UL (ref 1–4.8)
LYMPHOCYTES NFR BLD: 14.6 % (ref 22–41)
MCH RBC QN AUTO: 26.2 PG (ref 27–33)
MCHC RBC AUTO-ENTMCNC: 30.8 G/DL (ref 32.2–35.5)
MCV RBC AUTO: 84.8 FL (ref 81.4–97.8)
MONOCYTES # BLD AUTO: 0.26 K/UL (ref 0–0.85)
MONOCYTES NFR BLD AUTO: 10 % (ref 0–13.4)
NEUTROPHILS # BLD AUTO: 1.85 K/UL (ref 1.82–7.42)
NEUTROPHILS NFR BLD: 71.2 % (ref 44–72)
NRBC # BLD AUTO: 0 K/UL
NRBC BLD-RTO: 0 /100 WBC (ref 0–0.2)
PLATELET # BLD AUTO: 55 K/UL (ref 164–446)
PLATELETS.RETICULATED NFR BLD AUTO: 4.3 % (ref 0.6–13.1)
PMV BLD AUTO: 10 FL (ref 9–12.9)
POTASSIUM SERPL-SCNC: 4.2 MMOL/L (ref 3.6–5.5)
PROT SERPL-MCNC: 7.1 G/DL (ref 6–8.2)
PROTHROMBIN TIME: 17.7 SEC (ref 12–14.6)
RBC # BLD AUTO: 4.09 M/UL (ref 4.2–5.4)
RH BLD: NORMAL
SODIUM SERPL-SCNC: 140 MMOL/L (ref 135–145)
WBC # BLD AUTO: 2.6 K/UL (ref 4.8–10.8)

## 2023-10-19 PROCEDURE — 99285 EMERGENCY DEPT VISIT HI MDM: CPT | Performed by: INTERNAL MEDICINE

## 2023-10-19 PROCEDURE — 86850 RBC ANTIBODY SCREEN: CPT

## 2023-10-19 PROCEDURE — 36415 COLL VENOUS BLD VENIPUNCTURE: CPT

## 2023-10-19 PROCEDURE — 86900 BLOOD TYPING SEROLOGIC ABO: CPT

## 2023-10-19 PROCEDURE — 80053 COMPREHEN METABOLIC PANEL: CPT

## 2023-10-19 PROCEDURE — 700105 HCHG RX REV CODE 258: Performed by: STUDENT IN AN ORGANIZED HEALTH CARE EDUCATION/TRAINING PROGRAM

## 2023-10-19 PROCEDURE — 85730 THROMBOPLASTIN TIME PARTIAL: CPT

## 2023-10-19 PROCEDURE — 700111 HCHG RX REV CODE 636 W/ 250 OVERRIDE (IP): Performed by: STUDENT IN AN ORGANIZED HEALTH CARE EDUCATION/TRAINING PROGRAM

## 2023-10-19 PROCEDURE — 85610 PROTHROMBIN TIME: CPT

## 2023-10-19 PROCEDURE — 71045 X-RAY EXAM CHEST 1 VIEW: CPT

## 2023-10-19 PROCEDURE — 83690 ASSAY OF LIPASE: CPT

## 2023-10-19 PROCEDURE — 96374 THER/PROPH/DIAG INJ IV PUSH: CPT

## 2023-10-19 PROCEDURE — 85025 COMPLETE CBC W/AUTO DIFF WBC: CPT

## 2023-10-19 PROCEDURE — 99284 EMERGENCY DEPT VISIT MOD MDM: CPT

## 2023-10-19 PROCEDURE — 85055 RETICULATED PLATELET ASSAY: CPT

## 2023-10-19 PROCEDURE — 96375 TX/PRO/DX INJ NEW DRUG ADDON: CPT

## 2023-10-19 PROCEDURE — 85018 HEMOGLOBIN: CPT

## 2023-10-19 PROCEDURE — 86901 BLOOD TYPING SEROLOGIC RH(D): CPT

## 2023-10-19 RX ORDER — PANTOPRAZOLE SODIUM 40 MG/10ML
40 INJECTION, POWDER, LYOPHILIZED, FOR SOLUTION INTRAVENOUS 2 TIMES DAILY
Status: DISCONTINUED | OUTPATIENT
Start: 2023-10-19 | End: 2023-10-19 | Stop reason: HOSPADM

## 2023-10-19 RX ORDER — BISACODYL 10 MG
10 SUPPOSITORY, RECTAL RECTAL
Status: DISCONTINUED | OUTPATIENT
Start: 2023-10-19 | End: 2023-10-19 | Stop reason: HOSPADM

## 2023-10-19 RX ORDER — PROMETHAZINE HYDROCHLORIDE 25 MG/1
12.5-25 TABLET ORAL EVERY 4 HOURS PRN
Status: DISCONTINUED | OUTPATIENT
Start: 2023-10-19 | End: 2023-10-19 | Stop reason: HOSPADM

## 2023-10-19 RX ORDER — ONDANSETRON 4 MG/1
4 TABLET, ORALLY DISINTEGRATING ORAL EVERY 4 HOURS PRN
Status: DISCONTINUED | OUTPATIENT
Start: 2023-10-19 | End: 2023-10-19 | Stop reason: HOSPADM

## 2023-10-19 RX ORDER — PROMETHAZINE HYDROCHLORIDE 25 MG/1
12.5-25 SUPPOSITORY RECTAL EVERY 4 HOURS PRN
Status: DISCONTINUED | OUTPATIENT
Start: 2023-10-19 | End: 2023-10-19 | Stop reason: HOSPADM

## 2023-10-19 RX ORDER — SODIUM CHLORIDE 9 MG/ML
INJECTION, SOLUTION INTRAVENOUS ONCE
Status: COMPLETED | OUTPATIENT
Start: 2023-10-19 | End: 2023-10-19

## 2023-10-19 RX ORDER — DIPHENHYDRAMINE HYDROCHLORIDE 50 MG/ML
12.5 INJECTION INTRAMUSCULAR; INTRAVENOUS ONCE
Status: COMPLETED | OUTPATIENT
Start: 2023-10-19 | End: 2023-10-19

## 2023-10-19 RX ORDER — AMOXICILLIN 250 MG
2 CAPSULE ORAL 2 TIMES DAILY
Status: DISCONTINUED | OUTPATIENT
Start: 2023-10-19 | End: 2023-10-19 | Stop reason: HOSPADM

## 2023-10-19 RX ORDER — PROCHLORPERAZINE EDISYLATE 5 MG/ML
5-10 INJECTION INTRAMUSCULAR; INTRAVENOUS EVERY 4 HOURS PRN
Status: DISCONTINUED | OUTPATIENT
Start: 2023-10-19 | End: 2023-10-19 | Stop reason: HOSPADM

## 2023-10-19 RX ORDER — SODIUM CHLORIDE 9 MG/ML
INJECTION, SOLUTION INTRAVENOUS CONTINUOUS
Status: DISCONTINUED | OUTPATIENT
Start: 2023-10-19 | End: 2023-10-19 | Stop reason: HOSPADM

## 2023-10-19 RX ORDER — ONDANSETRON 2 MG/ML
4 INJECTION INTRAMUSCULAR; INTRAVENOUS EVERY 4 HOURS PRN
Status: DISCONTINUED | OUTPATIENT
Start: 2023-10-19 | End: 2023-10-19 | Stop reason: HOSPADM

## 2023-10-19 RX ORDER — PROCHLORPERAZINE EDISYLATE 5 MG/ML
10 INJECTION INTRAMUSCULAR; INTRAVENOUS ONCE
Status: COMPLETED | OUTPATIENT
Start: 2023-10-19 | End: 2023-10-19

## 2023-10-19 RX ORDER — ACETAMINOPHEN 325 MG/1
650 TABLET ORAL EVERY 6 HOURS PRN
Status: DISCONTINUED | OUTPATIENT
Start: 2023-10-19 | End: 2023-10-19 | Stop reason: HOSPADM

## 2023-10-19 RX ORDER — MIDODRINE HYDROCHLORIDE 2.5 MG/1
2.5 TABLET ORAL
Status: DISCONTINUED | OUTPATIENT
Start: 2023-10-19 | End: 2023-10-19 | Stop reason: HOSPADM

## 2023-10-19 RX ORDER — BUTALBITAL, ACETAMINOPHEN AND CAFFEINE 50; 325; 40 MG/1; MG/1; MG/1
1 TABLET ORAL EVERY 6 HOURS PRN
Status: DISCONTINUED | OUTPATIENT
Start: 2023-10-19 | End: 2023-10-19 | Stop reason: HOSPADM

## 2023-10-19 RX ORDER — POLYETHYLENE GLYCOL 3350 17 G/17G
1 POWDER, FOR SOLUTION ORAL
Status: DISCONTINUED | OUTPATIENT
Start: 2023-10-19 | End: 2023-10-19 | Stop reason: HOSPADM

## 2023-10-19 RX ADMIN — DIPHENHYDRAMINE HYDROCHLORIDE 12.5 MG: 50 INJECTION, SOLUTION INTRAMUSCULAR; INTRAVENOUS at 15:08

## 2023-10-19 RX ADMIN — SODIUM CHLORIDE: 9 INJECTION, SOLUTION INTRAVENOUS at 15:08

## 2023-10-19 RX ADMIN — PROCHLORPERAZINE EDISYLATE 10 MG: 5 INJECTION INTRAMUSCULAR; INTRAVENOUS at 15:09

## 2023-10-19 ASSESSMENT — FIBROSIS 4 INDEX: FIB4 SCORE: 3.45

## 2023-10-19 ASSESSMENT — ENCOUNTER SYMPTOMS
BLOOD IN STOOL: 1
WEAKNESS: 0
SHORTNESS OF BREATH: 0
NAUSEA: 1
ABDOMINAL PAIN: 0

## 2023-10-19 ASSESSMENT — LIFESTYLE VARIABLES: SUBSTANCE_ABUSE: 0

## 2023-10-19 NOTE — ED PROVIDER NOTES
"ED Provider Note    CHIEF COMPLAINT  Chief Complaint   Patient presents with    Rectal Bleeding                EXTERNAL RECORDS REVIEWED  Inpatient Notes discharge summary on 10/17/2023 for gastric antral vascular ectasia      HPI/ROS  LIMITATION TO HISTORY   Select: : None  OUTSIDE HISTORIAN(S):      Tonie Tineo is a 38 y.o. female who presents with rectal bleeding.  Patient reports that this started today and was a large amount of filled entire toilet bowl.  Patient reports no nausea or vomiting.  Patient reports no abdominal pain.  Patient does endorse persistent headache.  Patient was concerned because she is going along bus ride to [place] and did not want to have bleeding in route.  Patient denies fever chills body aches or sweats.  Patient does endorse some lightheadedness.    PAST MEDICAL HISTORY   has a past medical history of Autoimmune hepatitis (HCC), Bleeding esophageal varices due to autoimmune hepatitis (HCC), Hepatitis, and Patient denies medical problems.    SURGICAL HISTORY   has a past surgical history that includes upper gi endoscopy,diagnosis (N/A, 4/19/2023); upper gi endoscopy,ligat varix (4/19/2023); upper gi endoscopy,diagnosis (N/A, 10/15/2023); and upper gi endoscopy,biopsy (N/A, 10/15/2023).    FAMILY HISTORY  No family history on file.    SOCIAL HISTORY  Social History     Tobacco Use    Smoking status: Former     Types: Cigarettes    Smokeless tobacco: Never   Vaping Use    Vaping Use: Never used   Substance and Sexual Activity    Alcohol use: Not Currently     Comment: daily \"99 bananas\"    Drug use: Not Currently     Comment: Hx of denies recent use.    Sexual activity: Not on file       CURRENT MEDICATIONS  Home Medications    **Home medications have not yet been reviewed for this encounter**         ALLERGIES  Allergies   Allergen Reactions    Levofloxacin Anaphylaxis       PHYSICAL EXAM  VITAL SIGNS: /60   Pulse 72   Temp 36.6 °C (97.8 °F) (Temporal)   Resp 14   " "Ht 1.727 m (5' 8\")   Wt 73.1 kg (161 lb 2.5 oz)   SpO2 100%   BMI 24.50 kg/m²    Vitals and nursing note reviewed.   Constitutional:       Comments: Patient is lying in bed supine, pleasant, conversant, speaking in complete sentences   HENT:      Head: Normocephalic and atraumatic.   Eyes:      Extraocular Movements: Extraocular movements intact.      Conjunctiva/sclera: Conjunctivae normal.      Pupils: Pupils are equal, round, and reactive to light.   Cardiovascular:      Pulses: Normal pulses.      Comments: HR 81  Pulmonary:      Effort: Pulmonary effort is normal. No respiratory distress.   Abdominal:      Comments: Abdomen is soft, non-tender, non-distended, non-rigid, no rebound, guarding, masses, no McBurney's point tenderness, no peritoneal signs, negative Rovsing sign, negative Lee sign.  No CVA tenderness to palpation. Benign abdomen.   :  No masses, hemorrhoids, Hemoccult positive but grossly negative  Musculoskeletal:         General: No swelling. Normal range of motion.      Cervical back: Normal range of motion. No rigidity.   Skin:     General: Skin is warm and dry.      Capillary Refill: Capillary refill takes less than 2 seconds.   Neurological:      Mental Status: Alert.       DIAGNOSTIC STUDIES / PROCEDURES      LABS  Thrombocytopenia, chronic anemia      COURSE & MEDICAL DECISION MAKING    INITIAL ASSESSMENT, COURSE AND PLAN  Care Narrative: Unclear etiology of patient's lower GI bleed.  External hemorrhoids inconsistent with patient presentation at this time given the absence of visible hemorrhoids.  Internal hemorrhoids possible but nonpalpable on exam.  Rectal fissure less likely given absence of extreme pain with rectal exam.  Colon mass, angiodysplasia, diverticulosis also possible.  Nevertheless given the patient's thrombocytopenia, cirrhosis I am concerned about discharging the patient to a long travel when she is actively bleeding with thrombocytopenia.  Patient without abdominal " tenderness to palpation, GI bleed upper less likely given patient stable clinical status at this time, appendicitis, diverticulitis, small bowel obstruction, cholecystitis inconsistent with patient presentation at this time.  Disposition pending GI consultation.    Electronically signed by: Jovani Sheppard M.D., 10/19/2023 2:22 PM    Patient consulted to gastroenterology who recommends bowel prep followed by endoscopy and colonoscopy.  Patient is high risk for GI bleed given cirrhosis, persistent thrombocytopenia, elevated INR. Dr Potter of [facility] has graciously excepted the patient to her service for admission.    This dictation has been created using voice recognition software. I am continuously working with the software to minimize the number of voice recognition errors and I have made every attempt to manually correct the errors within my dictation. However errors  related to this voice recognition software may still exist and should be interpreted within the appropriate context.     Electronically signed by: Jovani Sheppard M.D., 10/19/2023 3:58 PM      HYDRATION: Based on the patient's presentation of Dehydration the patient was given IV fluids. IV Hydration was used because oral hydration was not adequate alone. Upon recheck following hydration, the patient was improved.        DISPOSITION AND DISCUSSIONS  I have discussed management of the patient with the following physicians and AMADEO's:  Dr Potter, Dr Jade      FINAL DIAGNOSIS  1. Rectal bleeding    2. Thrombocytopenia (HCC)    3. Chronic anemia           Electronically signed by: Jovani Sheppard M.D., 10/19/2023 2:17 PM
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
## 2023-10-19 NOTE — H&P
Hospital Medicine History & Physical Note    Date of Service  10/19/2023    Primary Care Physician  Pcp Pt States None    Consultants  [facility]    Code Status  Full    Chief Complaint  Chief Complaint   Patient presents with    Rectal Bleeding                History of Presenting Illness  Tonie Tineo is a 38 y.o. female who presented 10/19/2023 with autoimmune cirrhosis, known esophageal varices status post banding who was recently hospitalized and discharged 2 days ago for hematemesis she had endoscopy at that time that showed gastric ulcers.  GI recommended 8 weeks of PPI twice daily.  Hemoglobin is 10.7.  Baseline seems to be around 9.  Platelet 55.  INR 1.44  She presents now with sudden bright red blood pre rectum. She denies using NSAIDs or alcohol. Has some nausea, no abd pain    I discussed the plan of care with patient.    Review of Systems  Review of Systems   Constitutional:  Positive for malaise/fatigue.   Respiratory:  Negative for shortness of breath.    Cardiovascular:  Negative for chest pain.   Gastrointestinal:  Positive for blood in stool and nausea. Negative for abdominal pain.   Neurological:  Negative for weakness.   Psychiatric/Behavioral:  Negative for substance abuse.        Past Medical History   has a past medical history of Autoimmune hepatitis (HCC), Bleeding esophageal varices due to autoimmune hepatitis (HCC), Hepatitis, and Patient denies medical problems.    Surgical History   has a past surgical history that includes pr upper gi endoscopy,diagnosis (N/A, 4/19/2023); pr upper gi endoscopy,ligat varix (4/19/2023); pr upper gi endoscopy,diagnosis (N/A, 10/15/2023); and pr upper gi endoscopy,biopsy (N/A, 10/15/2023).     Family History  family history is not on file.       Social History   reports that she has quit smoking. Her smoking use included cigarettes. She has never used smokeless tobacco. She reports that she does not currently use alcohol. She reports that she does not  currently use drugs.    Allergies  Allergies   Allergen Reactions    Levofloxacin Anaphylaxis       Medications  Prior to Admission Medications   Prescriptions Last Dose Informant Patient Reported? Taking?   Ferrous Sulfate (IRON) 325 (65 Fe) MG Tab  Patient Yes No   Sig: Take  by mouth.   acetaminophen (TYLENOL) 325 MG Tab   No No   Sig: Take 1 Tablet by mouth every four hours as needed (headache) for up to 14 days. Not to exceed 2g/day given cirrhosis   alprazolam (XANAX) 2 MG tablet  Patient Yes No   Sig: Take 1 mg by mouth at bedtime as needed for Sleep.   citalopram (CELEXA) 20 MG Tab  Patient Yes No   Sig: Take 20 mg by mouth every day.   furosemide (LASIX) 40 MG Tab  Patient Yes No   Sig: Take 40 mg by mouth every day.   furosemide (LASIX) 40 MG Tab   No No   Sig: Take 1 Tablet by mouth every day for 30 days.   lactulose 10 GM/15ML Solution  Patient Yes No   Sig: Take 20 g by mouth two times a week.   midodrine (PROAMATINE) 10 MG tablet  Patient Yes No   Sig: Take 10 mg by mouth 3 times a day as needed. Indications: Disorder of Low Blood Pressure   midodrine (PROAMATINE) 2.5 MG Tab   No No   Sig: Take 1 Tablet by mouth 3 times a day with meals.   nadolol (CORGARD) 20 MG Tab  Patient Yes No   Sig: Take 20 mg by mouth every day.   omeprazole (PRILOSEC) 40 MG delayed-release capsule   No No   Sig: Take 1 Capsule by mouth 2 times a day for 54 days.   ondansetron (ZOFRAN ODT) 4 MG TABLET DISPERSIBLE  Patient No No   Sig: Take 1 Tablet by mouth every four hours as needed for Nausea/Vomiting.   pantoprazole (PROTONIX) 40 MG Tablet Delayed Response  Patient Yes No   Sig: Take 40 mg by mouth 2 times a day.   riFAXIMin (XIFAXAN) 550 MG Tab tablet  Patient Yes No   Sig: Take 550 mg by mouth every day.   spironolactone (ALDACTONE) 50 MG Tab  Patient Yes No   Sig: Take 50 mg by mouth 2 times a day.   spironolactone (ALDACTONE) 50 MG Tab   No No   Sig: Take 1 Tablet by mouth 2 times a day for 30 days.     "  Facility-Administered Medications: None       Physical Exam  Temp:  [36.6 °C (97.8 °F)] 36.6 °C (97.8 °F)  Pulse:  [71-81] 72  Resp:  [14-16] 14  BP: ()/(60-67) 104/60  SpO2:  [100 %] 100 %  Blood Pressure: 111/67   Temperature: 36.6 °C (97.8 °F)   Pulse: 81   Respiration: 16   Pulse Oximetry: 100 %       Physical Exam  Vitals and nursing note reviewed.   Constitutional:       General: She is not in acute distress.     Appearance: She is not toxic-appearing.   HENT:      Head: Normocephalic.      Mouth/Throat:      Mouth: Mucous membranes are moist.   Eyes:      General:         Right eye: No discharge.         Left eye: No discharge.   Cardiovascular:      Rate and Rhythm: Normal rate and regular rhythm.   Pulmonary:      Effort: Pulmonary effort is normal. No respiratory distress.      Breath sounds: No wheezing or rales.   Abdominal:      Palpations: Abdomen is soft.      Tenderness: There is no abdominal tenderness.   Musculoskeletal:         General: No swelling.      Cervical back: Neck supple.   Skin:     General: Skin is warm and dry.   Neurological:      Mental Status: She is alert and oriented to person, place, and time.         Laboratory:  Recent Labs     10/17/23  0223 10/19/23  0944 10/19/23  1550   WBC 4.0* 2.6*  --    RBC 3.29* 4.09*  --    HEMOGLOBIN 8.8* 10.7* 9.8*   HEMATOCRIT 28.0* 34.7*  --    MCV 85.1 84.8  --    MCH 26.7* 26.2*  --    MCHC 31.4* 30.8*  --    RDW 43.6 43.8  --    PLATELETCT 52* 55*  --    MPV 10.3 10.0  --      Recent Labs     10/17/23  0223 10/19/23  0944   SODIUM 138 140   POTASSIUM 3.7 4.2   CHLORIDE 106 108   CO2 25 22   GLUCOSE 108* 147*   BUN 7* 7*   CREATININE 0.55 0.52   CALCIUM 8.3* 9.0     Recent Labs     10/17/23  0223 10/19/23  0944   ALTSGPT 25 28   ASTSGOT 23 25   ALKPHOSPHAT 66 77   TBILIRUBIN 0.8 1.2   LIPASE  --  25   GLUCOSE 108* 147*     Recent Labs     10/19/23  0944   APTT 37.7*   INR 1.44*     No results for input(s): \"NTPROBNP\" in the last 72 " "hours.      No results for input(s): \"TROPONINT\" in the last 72 hours.    Imaging:  DX-CHEST-PORTABLE (1 VIEW)   Final Result      No acute cardiac or pulmonary abnormalities are identified.            Assessment/Plan:  Justification for Admission Status  I anticipate this patient will require at least two midnights for appropriate medical management, necessitating inpatient admission because GI bleeding with GI consult    Patient will need a Telemetry bed on EMERGENCY service .  The need is secondary to GI bleeding with GI consult.    * Rectal bleeding- (present on admission)  Assessment & Plan  GI consulted and plans for EGD/colonoscopy  Trend Hgb    Thrombocytopenia (HCC)- (present on admission)  Assessment & Plan  Due to cirrhosis    Autoimmune hepatitis (HCC)- (present on admission)  Assessment & Plan  Chronic, she was followed yb GI in [facility]        VTE prophylaxis: SCDs/TEDs  "

## 2023-10-19 NOTE — ED NOTES
Med rec completed per patient  Allergies reviewed  No PO Antibiotics in the last 30 days   No Anticoagulants in the last 30 days    Preferred Pharmacy:  [facility] in [place]

## 2023-10-19 NOTE — ED TRIAGE NOTES
Pt comes in complaining of bright red rectal bleeding starting today. Pt stating approx 3-4 days ago she was admitted for vomiting blood. Pt did have to have that repaired.
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
## 2023-10-20 NOTE — ED NOTES
Tonie Tineo expresses desire to leave. Risks in leaving ED before treatment given and diagnostics completed discussed with patient. EP completed AMA form and patient  signed form.

## 2023-10-21 LAB
BACTERIA BLD CULT: NORMAL
BACTERIA BLD CULT: NORMAL
SIGNIFICANT IND 70042: NORMAL
SIGNIFICANT IND 70042: NORMAL
SITE SITE: NORMAL
SITE SITE: NORMAL
SOURCE SOURCE: NORMAL
SOURCE SOURCE: NORMAL

## 2025-02-27 NOTE — PROGRESS NOTES
Telemetry Report  Rhythm  NSR  Heart Rate      UT 0.162  QRS 0.051  QT 0.382            Per telemetry room monitor    Continue Regimen: Mupirocin PRN open wound; suggest to restart imiquimod cream 2-3x/week if he notices that the keloid is thickening again. Otc Regimen: Silicone gel; hydrocolloid bandages (prior to work out) Detail Level: Simple Plan: ILK today Otc Regimen: Aquaphor or other healing ointment multiple times daily Plan: Pt was warned that the lesion may come back Initiate Treatment: Mupirocin ointment bid to prevent infection due to location within nares

## (undated) DEVICE — BLOCK BITE MAXI DENTAL RETENTION RIM (100EA/BX)

## (undated) DEVICE — MASK PANORAMIC OXYGEN PRO2 (30EA/CA)

## (undated) DEVICE — KIT CUSTOM PROCEDURE SINGLE FOR ENDO  (15/CA)

## (undated) DEVICE — FORCEP RADIAL JAW 4 STANDARD CAPACITY W/NEEDLE 240CM (40EA/BX)

## (undated) DEVICE — WATER IRRIGATION STERILE 1000ML (12EA/CA)

## (undated) DEVICE — NEPTUNE 4 PORT MANIFOLD - (20/PK)

## (undated) DEVICE — ELECTRODE 850 FOAM ADHESIVE - HYDROGEL RADIOTRNSPRNT (50/PK)

## (undated) DEVICE — CANISTER SUCTION RIGID RED 1500CC (40EA/CA)

## (undated) DEVICE — SET LEADWIRE 5 LEAD BEDSIDE DISPOSABLE ECG (1SET OF 5/EA)

## (undated) DEVICE — BITE BLOCK, DISP.

## (undated) DEVICE — CANNULA O2 COMFORT SOFT EAR ADULT 7 FT TUBING (50/CA)

## (undated) DEVICE — SPEEDBAND SUPERVIEW SUPER 7 (4/BX)

## (undated) DEVICE — MANIFOLD NEPTUNE 1 PORT (20/PK)

## (undated) DEVICE — FILM CASSETTE ENDO

## (undated) DEVICE — MASK OXYGEN VNYL ADLT MED CONC WITH 7 FOOT TUBING  - (50EA/CA)

## (undated) DEVICE — SODIUM CHL IRRIGATION 0.9% 1000ML (12EA/CA)

## (undated) DEVICE — SENSOR OXIMETER ADULT SPO2 RD SET (20EA/BX)

## (undated) DEVICE — TUBE CONNECTING SUCTION - CLEAR PLASTIC STERILE 72 IN (50EA/CA)

## (undated) DEVICE — SET EXTENSION WITH 2 PORTS (48EA/CA) ***PART #2C8610 IS A SUBSTITUTE*****

## (undated) DEVICE — CONTAINER, SPECIMEN, STERILE

## (undated) DEVICE — TOWEL STOP TIMEOUT SAFETY FLAG (40EA/CA)